# Patient Record
Sex: FEMALE | Race: WHITE | NOT HISPANIC OR LATINO
[De-identification: names, ages, dates, MRNs, and addresses within clinical notes are randomized per-mention and may not be internally consistent; named-entity substitution may affect disease eponyms.]

---

## 2017-07-15 ENCOUNTER — HEALTH MAINTENANCE LETTER (OUTPATIENT)
Age: 52
End: 2017-07-15

## 2018-10-31 ENCOUNTER — COMMUNICATION - HEALTHEAST (OUTPATIENT)
Dept: TELEHEALTH | Facility: CLINIC | Age: 53
End: 2018-10-31

## 2018-10-31 ENCOUNTER — OFFICE VISIT - HEALTHEAST (OUTPATIENT)
Dept: FAMILY MEDICINE | Facility: CLINIC | Age: 53
End: 2018-10-31

## 2018-10-31 ENCOUNTER — COMMUNICATION - HEALTHEAST (OUTPATIENT)
Dept: INTERNAL MEDICINE | Facility: CLINIC | Age: 53
End: 2018-10-31

## 2018-10-31 DIAGNOSIS — M54.16 CHRONIC RADICULAR PAIN OF LOWER BACK: ICD-10-CM

## 2018-10-31 DIAGNOSIS — G89.29 CHRONIC NECK PAIN: ICD-10-CM

## 2018-10-31 DIAGNOSIS — Z12.11 SCREEN FOR COLON CANCER: ICD-10-CM

## 2018-10-31 DIAGNOSIS — M54.2 CHRONIC NECK PAIN: ICD-10-CM

## 2018-10-31 DIAGNOSIS — Z12.31 VISIT FOR SCREENING MAMMOGRAM: ICD-10-CM

## 2018-10-31 DIAGNOSIS — F41.1 GAD (GENERALIZED ANXIETY DISORDER): ICD-10-CM

## 2018-10-31 DIAGNOSIS — G89.29 CHRONIC RADICULAR PAIN OF LOWER BACK: ICD-10-CM

## 2018-10-31 DIAGNOSIS — F33.9 MAJOR DEPRESSION, RECURRENT (H): ICD-10-CM

## 2018-11-01 ENCOUNTER — COMMUNICATION - HEALTHEAST (OUTPATIENT)
Dept: FAMILY MEDICINE | Facility: CLINIC | Age: 53
End: 2018-11-01

## 2018-11-15 ENCOUNTER — OFFICE VISIT - HEALTHEAST (OUTPATIENT)
Dept: FAMILY MEDICINE | Facility: CLINIC | Age: 53
End: 2018-11-15

## 2018-11-15 DIAGNOSIS — M54.2 CHRONIC NECK PAIN: ICD-10-CM

## 2018-11-15 DIAGNOSIS — G89.29 CHRONIC RADICULAR PAIN OF LOWER BACK: ICD-10-CM

## 2018-11-15 DIAGNOSIS — M54.16 CHRONIC RADICULAR PAIN OF LOWER BACK: ICD-10-CM

## 2018-11-15 DIAGNOSIS — F41.1 GAD (GENERALIZED ANXIETY DISORDER): ICD-10-CM

## 2018-11-15 DIAGNOSIS — G89.29 CHRONIC NECK PAIN: ICD-10-CM

## 2018-11-15 DIAGNOSIS — F33.2 SEVERE EPISODE OF RECURRENT MAJOR DEPRESSIVE DISORDER, WITHOUT PSYCHOTIC FEATURES (H): ICD-10-CM

## 2018-11-15 DIAGNOSIS — G44.229 CHRONIC TENSION-TYPE HEADACHE, NOT INTRACTABLE: ICD-10-CM

## 2018-11-20 ENCOUNTER — COMMUNICATION - HEALTHEAST (OUTPATIENT)
Dept: FAMILY MEDICINE | Facility: CLINIC | Age: 53
End: 2018-11-20

## 2018-11-28 ENCOUNTER — COMMUNICATION - HEALTHEAST (OUTPATIENT)
Dept: INTERNAL MEDICINE | Facility: CLINIC | Age: 53
End: 2018-11-28

## 2018-12-12 ENCOUNTER — COMMUNICATION - HEALTHEAST (OUTPATIENT)
Dept: FAMILY MEDICINE | Facility: CLINIC | Age: 53
End: 2018-12-12

## 2018-12-14 ENCOUNTER — HOSPITAL ENCOUNTER (OUTPATIENT)
Dept: PHYSICAL MEDICINE AND REHAB | Facility: CLINIC | Age: 53
Discharge: HOME | End: 2018-12-14
Attending: FAMILY MEDICINE

## 2018-12-14 DIAGNOSIS — M48.061 SPINAL STENOSIS OF LUMBAR REGION WITHOUT NEUROGENIC CLAUDICATION: ICD-10-CM

## 2018-12-14 DIAGNOSIS — M48.02 CERVICAL STENOSIS OF SPINE: ICD-10-CM

## 2018-12-14 DIAGNOSIS — Z98.1 HISTORY OF LUMBAR FUSION: ICD-10-CM

## 2018-12-14 DIAGNOSIS — M53.3 SACROILIAC JOINT PAIN: ICD-10-CM

## 2018-12-17 ENCOUNTER — AMBULATORY - HEALTHEAST (OUTPATIENT)
Dept: NEUROSURGERY | Facility: CLINIC | Age: 53
End: 2018-12-17

## 2018-12-17 DIAGNOSIS — M54.2 CERVICALGIA: ICD-10-CM

## 2018-12-31 ENCOUNTER — OFFICE VISIT - HEALTHEAST (OUTPATIENT)
Dept: FAMILY MEDICINE | Facility: CLINIC | Age: 53
End: 2018-12-31

## 2018-12-31 DIAGNOSIS — F33.2 SEVERE EPISODE OF RECURRENT MAJOR DEPRESSIVE DISORDER, WITHOUT PSYCHOTIC FEATURES (H): ICD-10-CM

## 2018-12-31 DIAGNOSIS — F41.1 GAD (GENERALIZED ANXIETY DISORDER): ICD-10-CM

## 2018-12-31 DIAGNOSIS — R06.02 SOB (SHORTNESS OF BREATH): ICD-10-CM

## 2018-12-31 DIAGNOSIS — F17.210 CIGARETTE NICOTINE DEPENDENCE WITHOUT COMPLICATION: ICD-10-CM

## 2018-12-31 DIAGNOSIS — M54.2 CHRONIC NECK PAIN: ICD-10-CM

## 2018-12-31 DIAGNOSIS — M54.16 CHRONIC RADICULAR PAIN OF LOWER BACK: ICD-10-CM

## 2018-12-31 DIAGNOSIS — G89.29 CHRONIC NECK PAIN: ICD-10-CM

## 2018-12-31 DIAGNOSIS — G89.29 CHRONIC RADICULAR PAIN OF LOWER BACK: ICD-10-CM

## 2018-12-31 DIAGNOSIS — G47.00 INSOMNIA, UNSPECIFIED TYPE: ICD-10-CM

## 2018-12-31 ASSESSMENT — MIFFLIN-ST. JEOR: SCORE: 1398.74

## 2019-01-02 ENCOUNTER — AMBULATORY - HEALTHEAST (OUTPATIENT)
Dept: PULMONOLOGY | Facility: OTHER | Age: 54
End: 2019-01-02

## 2019-01-02 DIAGNOSIS — R06.02 SOB (SHORTNESS OF BREATH): ICD-10-CM

## 2019-01-03 ENCOUNTER — COMMUNICATION - HEALTHEAST (OUTPATIENT)
Dept: FAMILY MEDICINE | Facility: CLINIC | Age: 54
End: 2019-01-03

## 2019-01-03 DIAGNOSIS — G89.29 CHRONIC BILATERAL LOW BACK PAIN WITH BILATERAL SCIATICA: ICD-10-CM

## 2019-01-03 DIAGNOSIS — M54.16 CHRONIC RADICULAR PAIN OF LOWER BACK: ICD-10-CM

## 2019-01-03 DIAGNOSIS — M54.41 CHRONIC BILATERAL LOW BACK PAIN WITH BILATERAL SCIATICA: ICD-10-CM

## 2019-01-03 DIAGNOSIS — G89.29 CHRONIC RADICULAR PAIN OF LOWER BACK: ICD-10-CM

## 2019-01-03 DIAGNOSIS — M54.42 CHRONIC BILATERAL LOW BACK PAIN WITH BILATERAL SCIATICA: ICD-10-CM

## 2019-01-03 DIAGNOSIS — F32.2 SEVERE MAJOR DEPRESSION WITHOUT PSYCHOTIC FEATURES (H): ICD-10-CM

## 2019-01-04 ENCOUNTER — HOME CARE/HOSPICE - HEALTHEAST (OUTPATIENT)
Dept: HOME HEALTH SERVICES | Facility: HOME HEALTH | Age: 54
End: 2019-01-04

## 2019-01-07 ENCOUNTER — COMMUNICATION - HEALTHEAST (OUTPATIENT)
Dept: PULMONOLOGY | Facility: OTHER | Age: 54
End: 2019-01-07

## 2019-01-08 ENCOUNTER — COMMUNICATION - HEALTHEAST (OUTPATIENT)
Dept: HOME HEALTH SERVICES | Facility: HOME HEALTH | Age: 54
End: 2019-01-08

## 2019-01-08 ENCOUNTER — HOME CARE/HOSPICE - HEALTHEAST (OUTPATIENT)
Dept: HOME HEALTH SERVICES | Facility: HOME HEALTH | Age: 54
End: 2019-01-08

## 2019-01-08 ENCOUNTER — COMMUNICATION - HEALTHEAST (OUTPATIENT)
Dept: FAMILY MEDICINE | Facility: CLINIC | Age: 54
End: 2019-01-08

## 2019-01-09 ENCOUNTER — HOME CARE/HOSPICE - HEALTHEAST (OUTPATIENT)
Dept: HOME HEALTH SERVICES | Facility: HOME HEALTH | Age: 54
End: 2019-01-09

## 2019-01-09 ENCOUNTER — COMMUNICATION - HEALTHEAST (OUTPATIENT)
Dept: SCHEDULING | Facility: CLINIC | Age: 54
End: 2019-01-09

## 2019-01-09 DIAGNOSIS — G89.29 CHRONIC NECK PAIN: ICD-10-CM

## 2019-01-09 DIAGNOSIS — G89.29 CHRONIC RADICULAR PAIN OF LOWER BACK: ICD-10-CM

## 2019-01-09 DIAGNOSIS — M54.16 CHRONIC RADICULAR PAIN OF LOWER BACK: ICD-10-CM

## 2019-01-09 DIAGNOSIS — M54.2 CHRONIC NECK PAIN: ICD-10-CM

## 2019-01-18 ENCOUNTER — RECORDS - HEALTHEAST (OUTPATIENT)
Dept: ADMINISTRATIVE | Facility: OTHER | Age: 54
End: 2019-01-18

## 2019-01-29 ENCOUNTER — OFFICE VISIT - HEALTHEAST (OUTPATIENT)
Dept: NEUROSURGERY | Facility: CLINIC | Age: 54
End: 2019-01-29

## 2019-01-29 ENCOUNTER — HOSPITAL ENCOUNTER (OUTPATIENT)
Dept: RADIOLOGY | Facility: HOSPITAL | Age: 54
Discharge: HOME | End: 2019-01-29
Attending: NEUROLOGICAL SURGERY

## 2019-01-29 DIAGNOSIS — M40.12 OTHER SECONDARY KYPHOSIS, CERVICAL REGION: ICD-10-CM

## 2019-01-29 DIAGNOSIS — M47.816 SPONDYLOSIS OF LUMBAR REGION WITHOUT MYELOPATHY OR RADICULOPATHY: ICD-10-CM

## 2019-01-29 DIAGNOSIS — M47.22 OSTEOARTHRITIS OF SPINE WITH RADICULOPATHY, CERVICAL REGION: ICD-10-CM

## 2019-01-29 DIAGNOSIS — M54.2 CERVICALGIA: ICD-10-CM

## 2019-01-29 ASSESSMENT — MIFFLIN-ST. JEOR: SCORE: 1396.01

## 2019-01-31 ENCOUNTER — OFFICE VISIT - HEALTHEAST (OUTPATIENT)
Dept: FAMILY MEDICINE | Facility: CLINIC | Age: 54
End: 2019-01-31

## 2019-01-31 DIAGNOSIS — G47.00 INSOMNIA, UNSPECIFIED TYPE: ICD-10-CM

## 2019-01-31 DIAGNOSIS — F33.2 SEVERE EPISODE OF RECURRENT MAJOR DEPRESSIVE DISORDER, WITHOUT PSYCHOTIC FEATURES (H): ICD-10-CM

## 2019-01-31 DIAGNOSIS — F41.1 GAD (GENERALIZED ANXIETY DISORDER): ICD-10-CM

## 2019-01-31 DIAGNOSIS — G89.29 CHRONIC NECK PAIN: ICD-10-CM

## 2019-01-31 DIAGNOSIS — G89.29 CHRONIC RADICULAR PAIN OF LOWER BACK: ICD-10-CM

## 2019-01-31 DIAGNOSIS — M54.16 CHRONIC RADICULAR PAIN OF LOWER BACK: ICD-10-CM

## 2019-01-31 DIAGNOSIS — M54.2 CHRONIC NECK PAIN: ICD-10-CM

## 2019-01-31 DIAGNOSIS — F17.210 CIGARETTE NICOTINE DEPENDENCE WITHOUT COMPLICATION: ICD-10-CM

## 2019-01-31 LAB
AMPHETAMINES UR QL SCN: ABNORMAL
BARBITURATES UR QL: ABNORMAL
BENZODIAZ UR QL: ABNORMAL
CANNABINOIDS UR QL SCN: ABNORMAL
COCAINE UR QL: ABNORMAL
CREAT UR-MCNC: 67 MG/DL
METHADONE UR QL SCN: ABNORMAL
OPIATES UR QL SCN: ABNORMAL
OXYCODONE UR QL: ABNORMAL
PCP UR QL SCN: ABNORMAL

## 2019-01-31 ASSESSMENT — MIFFLIN-ST. JEOR: SCORE: 1396.01

## 2019-02-11 ENCOUNTER — RECORDS - HEALTHEAST (OUTPATIENT)
Dept: ADMINISTRATIVE | Facility: OTHER | Age: 54
End: 2019-02-11

## 2019-03-01 ENCOUNTER — COMMUNICATION - HEALTHEAST (OUTPATIENT)
Dept: FAMILY MEDICINE | Facility: CLINIC | Age: 54
End: 2019-03-01

## 2019-03-04 ENCOUNTER — COMMUNICATION - HEALTHEAST (OUTPATIENT)
Dept: FAMILY MEDICINE | Facility: CLINIC | Age: 54
End: 2019-03-04

## 2019-03-04 DIAGNOSIS — F41.1 GAD (GENERALIZED ANXIETY DISORDER): ICD-10-CM

## 2019-03-05 ENCOUNTER — COMMUNICATION - HEALTHEAST (OUTPATIENT)
Dept: FAMILY MEDICINE | Facility: CLINIC | Age: 54
End: 2019-03-05

## 2019-03-14 ENCOUNTER — OFFICE VISIT - HEALTHEAST (OUTPATIENT)
Dept: FAMILY MEDICINE | Facility: CLINIC | Age: 54
End: 2019-03-14

## 2019-03-14 DIAGNOSIS — M54.16 CHRONIC RADICULAR PAIN OF LOWER BACK: ICD-10-CM

## 2019-03-14 DIAGNOSIS — F33.2 SEVERE EPISODE OF RECURRENT MAJOR DEPRESSIVE DISORDER, WITHOUT PSYCHOTIC FEATURES (H): ICD-10-CM

## 2019-03-14 DIAGNOSIS — M54.2 CHRONIC NECK PAIN: ICD-10-CM

## 2019-03-14 DIAGNOSIS — G89.29 CHRONIC RADICULAR PAIN OF LOWER BACK: ICD-10-CM

## 2019-03-14 DIAGNOSIS — F41.1 GAD (GENERALIZED ANXIETY DISORDER): ICD-10-CM

## 2019-03-14 DIAGNOSIS — F17.210 CIGARETTE NICOTINE DEPENDENCE WITHOUT COMPLICATION: ICD-10-CM

## 2019-03-14 DIAGNOSIS — G89.29 CHRONIC NECK PAIN: ICD-10-CM

## 2019-04-02 ENCOUNTER — COMMUNICATION - HEALTHEAST (OUTPATIENT)
Dept: FAMILY MEDICINE | Facility: CLINIC | Age: 54
End: 2019-04-02

## 2019-04-02 DIAGNOSIS — G89.29 CHRONIC RADICULAR PAIN OF LOWER BACK: ICD-10-CM

## 2019-04-02 DIAGNOSIS — F41.1 GAD (GENERALIZED ANXIETY DISORDER): ICD-10-CM

## 2019-04-02 DIAGNOSIS — M54.16 CHRONIC RADICULAR PAIN OF LOWER BACK: ICD-10-CM

## 2019-04-04 ENCOUNTER — HOSPITAL ENCOUNTER (OUTPATIENT)
Dept: RADIOLOGY | Facility: HOSPITAL | Age: 54
Discharge: HOME | End: 2019-04-04
Attending: NEUROLOGICAL SURGERY

## 2019-04-04 DIAGNOSIS — M47.816 SPONDYLOSIS OF LUMBAR REGION WITHOUT MYELOPATHY OR RADICULOPATHY: ICD-10-CM

## 2019-04-09 ENCOUNTER — OFFICE VISIT - HEALTHEAST (OUTPATIENT)
Dept: FAMILY MEDICINE | Facility: CLINIC | Age: 54
End: 2019-04-09

## 2019-04-09 DIAGNOSIS — F33.2 SEVERE EPISODE OF RECURRENT MAJOR DEPRESSIVE DISORDER, WITHOUT PSYCHOTIC FEATURES (H): ICD-10-CM

## 2019-04-09 DIAGNOSIS — H91.8X1 OTHER SPECIFIED HEARING LOSS OF RIGHT EAR, UNSPECIFIED HEARING STATUS ON CONTRALATERAL SIDE: ICD-10-CM

## 2019-04-09 DIAGNOSIS — G47.00 INSOMNIA, UNSPECIFIED TYPE: ICD-10-CM

## 2019-04-09 DIAGNOSIS — Z12.31 VISIT FOR SCREENING MAMMOGRAM: ICD-10-CM

## 2019-04-09 DIAGNOSIS — F41.1 GAD (GENERALIZED ANXIETY DISORDER): ICD-10-CM

## 2019-04-09 DIAGNOSIS — F17.210 CIGARETTE NICOTINE DEPENDENCE WITHOUT COMPLICATION: ICD-10-CM

## 2019-04-09 DIAGNOSIS — Z12.11 SCREEN FOR COLON CANCER: ICD-10-CM

## 2019-04-12 ENCOUNTER — COMMUNICATION - HEALTHEAST (OUTPATIENT)
Dept: FAMILY MEDICINE | Facility: CLINIC | Age: 54
End: 2019-04-12

## 2019-04-12 DIAGNOSIS — G47.00 INSOMNIA, UNSPECIFIED TYPE: ICD-10-CM

## 2019-04-19 ENCOUNTER — AMBULATORY - HEALTHEAST (OUTPATIENT)
Dept: NEUROSURGERY | Facility: CLINIC | Age: 54
End: 2019-04-19

## 2019-04-24 ENCOUNTER — OFFICE VISIT - HEALTHEAST (OUTPATIENT)
Dept: NEUROSURGERY | Facility: CLINIC | Age: 54
End: 2019-04-24

## 2019-04-24 DIAGNOSIS — M47.22 CERVICAL SPONDYLOSIS WITH MYELOPATHY AND RADICULOPATHY: ICD-10-CM

## 2019-04-24 DIAGNOSIS — M47.12 CERVICAL SPONDYLOSIS WITH MYELOPATHY AND RADICULOPATHY: ICD-10-CM

## 2019-04-24 DIAGNOSIS — R09.A2 GLOBUS SENSATION: ICD-10-CM

## 2019-04-24 DIAGNOSIS — M40.12 OTHER SECONDARY KYPHOSIS, CERVICAL REGION: ICD-10-CM

## 2019-04-24 DIAGNOSIS — Z72.0 TOBACCO ABUSE: ICD-10-CM

## 2019-04-29 ENCOUNTER — COMMUNICATION - HEALTHEAST (OUTPATIENT)
Dept: FAMILY MEDICINE | Facility: CLINIC | Age: 54
End: 2019-04-29

## 2019-04-29 DIAGNOSIS — M54.16 CHRONIC RADICULAR PAIN OF LOWER BACK: ICD-10-CM

## 2019-04-29 DIAGNOSIS — G89.29 CHRONIC RADICULAR PAIN OF LOWER BACK: ICD-10-CM

## 2019-05-08 ENCOUNTER — TRANSFERRED RECORDS (OUTPATIENT)
Dept: MULTI SPECIALTY CLINIC | Facility: CLINIC | Age: 54
End: 2019-05-08

## 2019-05-08 ENCOUNTER — RECORDS - HEALTHEAST (OUTPATIENT)
Dept: ADMINISTRATIVE | Facility: OTHER | Age: 54
End: 2019-05-08

## 2019-05-08 LAB — COLOGUARD INSUFFICIENT SPECIMEN: NORMAL

## 2019-05-09 ENCOUNTER — COMMUNICATION - HEALTHEAST (OUTPATIENT)
Dept: FAMILY MEDICINE | Facility: CLINIC | Age: 54
End: 2019-05-09

## 2019-05-14 ENCOUNTER — COMMUNICATION - HEALTHEAST (OUTPATIENT)
Dept: NEUROSURGERY | Facility: CLINIC | Age: 54
End: 2019-05-14

## 2019-05-14 ENCOUNTER — COMMUNICATION - HEALTHEAST (OUTPATIENT)
Dept: FAMILY MEDICINE | Facility: CLINIC | Age: 54
End: 2019-05-14

## 2019-05-15 ENCOUNTER — HOSPITAL ENCOUNTER (OUTPATIENT)
Dept: RADIOLOGY | Facility: HOSPITAL | Age: 54
Discharge: HOME | End: 2019-05-15
Attending: NEUROLOGICAL SURGERY

## 2019-05-15 ENCOUNTER — COMMUNICATION - HEALTHEAST (OUTPATIENT)
Dept: FAMILY MEDICINE | Facility: CLINIC | Age: 54
End: 2019-05-15

## 2019-05-15 DIAGNOSIS — F45.8 OTHER SOMATOFORM DISORDERS: ICD-10-CM

## 2019-05-15 DIAGNOSIS — R09.A2 GLOBUS SENSATION: ICD-10-CM

## 2019-05-16 ENCOUNTER — HOSPITAL ENCOUNTER (OUTPATIENT)
Dept: MAMMOGRAPHY | Facility: CLINIC | Age: 54
Discharge: HOME | End: 2019-05-16
Attending: FAMILY MEDICINE

## 2019-05-16 DIAGNOSIS — Z12.31 VISIT FOR SCREENING MAMMOGRAM: ICD-10-CM

## 2019-05-21 ENCOUNTER — COMMUNICATION - HEALTHEAST (OUTPATIENT)
Dept: FAMILY MEDICINE | Facility: CLINIC | Age: 54
End: 2019-05-21

## 2019-05-21 DIAGNOSIS — F17.210 CIGARETTE NICOTINE DEPENDENCE WITHOUT COMPLICATION: ICD-10-CM

## 2019-05-28 ENCOUNTER — COMMUNICATION - HEALTHEAST (OUTPATIENT)
Dept: FAMILY MEDICINE | Facility: CLINIC | Age: 54
End: 2019-05-28

## 2019-05-28 DIAGNOSIS — M54.16 CHRONIC RADICULAR PAIN OF LOWER BACK: ICD-10-CM

## 2019-05-28 DIAGNOSIS — G89.29 CHRONIC RADICULAR PAIN OF LOWER BACK: ICD-10-CM

## 2019-05-31 ENCOUNTER — OFFICE VISIT - HEALTHEAST (OUTPATIENT)
Dept: FAMILY MEDICINE | Facility: CLINIC | Age: 54
End: 2019-05-31

## 2019-05-31 DIAGNOSIS — Z71.6 ENCOUNTER FOR TOBACCO USE CESSATION COUNSELING: ICD-10-CM

## 2019-05-31 DIAGNOSIS — F33.2 SEVERE EPISODE OF RECURRENT MAJOR DEPRESSIVE DISORDER, WITHOUT PSYCHOTIC FEATURES (H): ICD-10-CM

## 2019-05-31 DIAGNOSIS — G89.29 CHRONIC RADICULAR PAIN OF LOWER BACK: ICD-10-CM

## 2019-05-31 DIAGNOSIS — M54.16 CHRONIC RADICULAR PAIN OF LOWER BACK: ICD-10-CM

## 2019-05-31 DIAGNOSIS — Z63.9 DIFFICULTY WITH FAMILY: ICD-10-CM

## 2019-05-31 DIAGNOSIS — Z12.11 SCREEN FOR COLON CANCER: ICD-10-CM

## 2019-05-31 LAB
AMPHETAMINES UR QL SCN: ABNORMAL
BARBITURATES UR QL: ABNORMAL
BENZODIAZ UR QL: ABNORMAL
CANNABINOIDS UR QL SCN: ABNORMAL
COCAINE UR QL: ABNORMAL
CREAT UR-MCNC: 134.7 MG/DL
METHADONE UR QL SCN: ABNORMAL
OPIATES UR QL SCN: ABNORMAL
OXYCODONE UR QL: ABNORMAL
PCP UR QL SCN: ABNORMAL

## 2019-05-31 SDOH — SOCIAL STABILITY - SOCIAL INSECURITY: PROBLEM RELATED TO PRIMARY SUPPORT GROUP, UNSPECIFIED: Z63.9

## 2019-05-31 ASSESSMENT — MIFFLIN-ST. JEOR: SCORE: 1438.65

## 2019-06-03 ENCOUNTER — COMMUNICATION - HEALTHEAST (OUTPATIENT)
Dept: NURSING | Facility: CLINIC | Age: 54
End: 2019-06-03

## 2019-06-03 ENCOUNTER — AMBULATORY - HEALTHEAST (OUTPATIENT)
Dept: LAB | Facility: HOSPITAL | Age: 54
End: 2019-06-03

## 2019-06-03 ENCOUNTER — COMMUNICATION - HEALTHEAST (OUTPATIENT)
Dept: FAMILY MEDICINE | Facility: CLINIC | Age: 54
End: 2019-06-03

## 2019-06-03 DIAGNOSIS — Z72.0 TOBACCO ABUSE: ICD-10-CM

## 2019-06-06 LAB
ANABASINE: <2 NG/ML
COTININE: 22 NG/ML
NICOTINE: 80 NG/ML
NORNICOTINE: 2.7 NG/ML

## 2019-06-07 ENCOUNTER — AMBULATORY - HEALTHEAST (OUTPATIENT)
Dept: NEUROSURGERY | Facility: CLINIC | Age: 54
End: 2019-06-07

## 2019-06-07 DIAGNOSIS — Z01.818 PRE-OP EXAM: ICD-10-CM

## 2019-06-14 ENCOUNTER — COMMUNICATION - HEALTHEAST (OUTPATIENT)
Dept: FAMILY MEDICINE | Facility: CLINIC | Age: 54
End: 2019-06-14

## 2019-06-14 ENCOUNTER — COMMUNICATION - HEALTHEAST (OUTPATIENT)
Dept: NURSING | Facility: CLINIC | Age: 54
End: 2019-06-14

## 2019-06-14 DIAGNOSIS — G47.00 INSOMNIA, UNSPECIFIED TYPE: ICD-10-CM

## 2019-06-21 ENCOUNTER — COMMUNICATION - HEALTHEAST (OUTPATIENT)
Dept: FAMILY MEDICINE | Facility: CLINIC | Age: 54
End: 2019-06-21

## 2019-06-21 DIAGNOSIS — G89.29 CHRONIC RADICULAR PAIN OF LOWER BACK: ICD-10-CM

## 2019-06-21 DIAGNOSIS — M54.16 CHRONIC RADICULAR PAIN OF LOWER BACK: ICD-10-CM

## 2019-06-25 ENCOUNTER — COMMUNICATION - HEALTHEAST (OUTPATIENT)
Dept: FAMILY MEDICINE | Facility: CLINIC | Age: 54
End: 2019-06-25

## 2019-06-25 ENCOUNTER — COMMUNICATION - HEALTHEAST (OUTPATIENT)
Dept: NURSING | Facility: CLINIC | Age: 54
End: 2019-06-25

## 2019-06-25 DIAGNOSIS — G89.29 CHRONIC RADICULAR PAIN OF LOWER BACK: ICD-10-CM

## 2019-06-25 DIAGNOSIS — M54.16 CHRONIC RADICULAR PAIN OF LOWER BACK: ICD-10-CM

## 2019-06-27 ENCOUNTER — COMMUNICATION - HEALTHEAST (OUTPATIENT)
Dept: NURSING | Facility: CLINIC | Age: 54
End: 2019-06-27

## 2019-06-28 ENCOUNTER — AMBULATORY - HEALTHEAST (OUTPATIENT)
Dept: LAB | Facility: HOSPITAL | Age: 54
End: 2019-06-28

## 2019-06-28 DIAGNOSIS — Z01.818 PRE-OP EXAM: ICD-10-CM

## 2019-07-01 LAB
ANABASINE: <2 NG/ML
COTININE: 16 NG/ML
NICOTINE: 7.6 NG/ML
NORNICOTINE: <2 NG/ML

## 2019-07-03 ENCOUNTER — AMBULATORY - HEALTHEAST (OUTPATIENT)
Dept: NEUROSURGERY | Facility: CLINIC | Age: 54
End: 2019-07-03

## 2019-07-03 DIAGNOSIS — Z01.818 PRE-OP EXAM: ICD-10-CM

## 2019-07-10 ENCOUNTER — RECORDS - HEALTHEAST (OUTPATIENT)
Dept: ADMINISTRATIVE | Facility: OTHER | Age: 54
End: 2019-07-10

## 2019-07-10 ENCOUNTER — AMBULATORY - HEALTHEAST (OUTPATIENT)
Dept: FAMILY MEDICINE | Facility: CLINIC | Age: 54
End: 2019-07-10

## 2019-07-10 DIAGNOSIS — F41.1 GAD (GENERALIZED ANXIETY DISORDER): ICD-10-CM

## 2019-07-10 DIAGNOSIS — F33.1 MODERATE EPISODE OF RECURRENT MAJOR DEPRESSIVE DISORDER (H): ICD-10-CM

## 2019-07-16 ENCOUNTER — COMMUNICATION - HEALTHEAST (OUTPATIENT)
Dept: FAMILY MEDICINE | Facility: CLINIC | Age: 54
End: 2019-07-16

## 2019-07-17 ENCOUNTER — AMBULATORY - HEALTHEAST (OUTPATIENT)
Dept: LAB | Facility: HOSPITAL | Age: 54
End: 2019-07-17

## 2019-07-17 DIAGNOSIS — Z01.818 PRE-OP EXAM: ICD-10-CM

## 2019-07-19 LAB
ANABASINE: <2 NG/ML
COTININE: 13 NG/ML
NICOTINE: 27 NG/ML
NORNICOTINE: <2 NG/ML

## 2019-07-29 ENCOUNTER — OFFICE VISIT - HEALTHEAST (OUTPATIENT)
Dept: FAMILY MEDICINE | Facility: CLINIC | Age: 54
End: 2019-07-29

## 2019-07-29 ENCOUNTER — COMMUNICATION - HEALTHEAST (OUTPATIENT)
Dept: FAMILY MEDICINE | Facility: CLINIC | Age: 54
End: 2019-07-29

## 2019-07-29 ENCOUNTER — RECORDS - HEALTHEAST (OUTPATIENT)
Dept: FAMILY MEDICINE | Facility: CLINIC | Age: 54
End: 2019-07-29

## 2019-07-29 DIAGNOSIS — G89.29 CHRONIC RADICULAR PAIN OF LOWER BACK: ICD-10-CM

## 2019-07-29 DIAGNOSIS — F41.1 GAD (GENERALIZED ANXIETY DISORDER): ICD-10-CM

## 2019-07-29 DIAGNOSIS — M54.16 CHRONIC RADICULAR PAIN OF LOWER BACK: ICD-10-CM

## 2019-07-29 DIAGNOSIS — G89.29 OTHER CHRONIC PAIN: ICD-10-CM

## 2019-07-29 DIAGNOSIS — F17.210 CIGARETTE NICOTINE DEPENDENCE WITHOUT COMPLICATION: ICD-10-CM

## 2019-07-29 DIAGNOSIS — J06.9 VIRAL UPPER RESPIRATORY TRACT INFECTION: ICD-10-CM

## 2019-07-29 ASSESSMENT — MIFFLIN-ST. JEOR: SCORE: 1427.31

## 2019-08-02 ENCOUNTER — AMBULATORY - HEALTHEAST (OUTPATIENT)
Dept: LAB | Facility: HOSPITAL | Age: 54
End: 2019-08-02

## 2019-08-02 ENCOUNTER — AMBULATORY - HEALTHEAST (OUTPATIENT)
Dept: NEUROSURGERY | Facility: CLINIC | Age: 54
End: 2019-08-02

## 2019-08-02 DIAGNOSIS — Z01.818 PRE-OP EXAM: ICD-10-CM

## 2019-08-03 LAB
ANABASINE: <2 NG/ML
COTININE: 32 NG/ML
NICOTINE: 110 NG/ML
NORNICOTINE: 4.3 NG/ML

## 2019-08-07 ENCOUNTER — AMBULATORY - HEALTHEAST (OUTPATIENT)
Dept: NEUROSURGERY | Facility: CLINIC | Age: 54
End: 2019-08-07

## 2019-08-07 DIAGNOSIS — Z01.818 PRE-OP EXAM: ICD-10-CM

## 2019-08-24 ENCOUNTER — COMMUNICATION - HEALTHEAST (OUTPATIENT)
Dept: FAMILY MEDICINE | Facility: CLINIC | Age: 54
End: 2019-08-24

## 2019-08-24 DIAGNOSIS — G89.29 CHRONIC RADICULAR PAIN OF LOWER BACK: ICD-10-CM

## 2019-08-24 DIAGNOSIS — M54.16 CHRONIC RADICULAR PAIN OF LOWER BACK: ICD-10-CM

## 2019-08-29 ENCOUNTER — AMBULATORY - HEALTHEAST (OUTPATIENT)
Dept: LAB | Facility: HOSPITAL | Age: 54
End: 2019-08-29

## 2019-08-29 ENCOUNTER — OFFICE VISIT - HEALTHEAST (OUTPATIENT)
Dept: FAMILY MEDICINE | Facility: CLINIC | Age: 54
End: 2019-08-29

## 2019-08-29 DIAGNOSIS — M54.16 CHRONIC RADICULAR PAIN OF LOWER BACK: ICD-10-CM

## 2019-08-29 DIAGNOSIS — F41.1 GAD (GENERALIZED ANXIETY DISORDER): ICD-10-CM

## 2019-08-29 DIAGNOSIS — F33.2 SEVERE EPISODE OF RECURRENT MAJOR DEPRESSIVE DISORDER, WITHOUT PSYCHOTIC FEATURES (H): ICD-10-CM

## 2019-08-29 DIAGNOSIS — G89.29 CHRONIC NECK PAIN: ICD-10-CM

## 2019-08-29 DIAGNOSIS — Z01.818 PRE-OP EXAM: ICD-10-CM

## 2019-08-29 DIAGNOSIS — M54.2 CHRONIC NECK PAIN: ICD-10-CM

## 2019-08-29 DIAGNOSIS — Z00.00 ROUTINE GENERAL MEDICAL EXAMINATION AT A HEALTH CARE FACILITY: ICD-10-CM

## 2019-08-29 DIAGNOSIS — Z12.11 SCREEN FOR COLON CANCER: ICD-10-CM

## 2019-08-29 DIAGNOSIS — G89.29 CHRONIC RADICULAR PAIN OF LOWER BACK: ICD-10-CM

## 2019-08-29 LAB
ALBUMIN SERPL-MCNC: 3.8 G/DL (ref 3.5–5)
ALP SERPL-CCNC: 108 U/L (ref 45–120)
ALT SERPL W P-5'-P-CCNC: 28 U/L (ref 0–45)
AMPHETAMINES UR QL SCN: ABNORMAL
ANION GAP SERPL CALCULATED.3IONS-SCNC: 9 MMOL/L (ref 5–18)
AST SERPL W P-5'-P-CCNC: 23 U/L (ref 0–40)
BARBITURATES UR QL: ABNORMAL
BASOPHILS # BLD AUTO: 0.1 THOU/UL (ref 0–0.2)
BASOPHILS NFR BLD AUTO: 1 % (ref 0–2)
BENZODIAZ UR QL: ABNORMAL
BILIRUB SERPL-MCNC: 0.3 MG/DL (ref 0–1)
BUN SERPL-MCNC: 10 MG/DL (ref 8–22)
CALCIUM SERPL-MCNC: 10 MG/DL (ref 8.5–10.5)
CANNABINOIDS UR QL SCN: ABNORMAL
CHLORIDE BLD-SCNC: 106 MMOL/L (ref 98–107)
CHOLEST SERPL-MCNC: 203 MG/DL
CO2 SERPL-SCNC: 24 MMOL/L (ref 22–31)
COCAINE UR QL: ABNORMAL
CREAT SERPL-MCNC: 0.89 MG/DL (ref 0.6–1.1)
CREAT UR-MCNC: 147.9 MG/DL
EOSINOPHIL # BLD AUTO: 0.4 THOU/UL (ref 0–0.4)
EOSINOPHIL NFR BLD AUTO: 4 % (ref 0–6)
ERYTHROCYTE [DISTWIDTH] IN BLOOD BY AUTOMATED COUNT: 14.1 % (ref 11–14.5)
FASTING STATUS PATIENT QL REPORTED: YES
GFR SERPL CREATININE-BSD FRML MDRD: >60 ML/MIN/1.73M2
GLUCOSE BLD-MCNC: 98 MG/DL (ref 70–125)
HCT VFR BLD AUTO: 40.2 % (ref 35–47)
HDLC SERPL-MCNC: 29 MG/DL
HGB BLD-MCNC: 13.1 G/DL (ref 12–16)
LDLC SERPL CALC-MCNC: 133 MG/DL
LYMPHOCYTES # BLD AUTO: 2.1 THOU/UL (ref 0.8–4.4)
LYMPHOCYTES NFR BLD AUTO: 21 % (ref 20–40)
MCH RBC QN AUTO: 27.7 PG (ref 27–34)
MCHC RBC AUTO-ENTMCNC: 32.6 G/DL (ref 32–36)
MCV RBC AUTO: 85 FL (ref 80–100)
METHADONE UR QL SCN: ABNORMAL
MONOCYTES # BLD AUTO: 0.7 THOU/UL (ref 0–0.9)
MONOCYTES NFR BLD AUTO: 7 % (ref 2–10)
NEUTROPHILS # BLD AUTO: 6.7 THOU/UL (ref 2–7.7)
NEUTROPHILS NFR BLD AUTO: 67 % (ref 50–70)
OPIATES UR QL SCN: ABNORMAL
OXYCODONE UR QL: ABNORMAL
PCP UR QL SCN: ABNORMAL
PLATELET # BLD AUTO: 398 THOU/UL (ref 140–440)
PMV BLD AUTO: 10.2 FL (ref 8.5–12.5)
POTASSIUM BLD-SCNC: 4.5 MMOL/L (ref 3.5–5)
PROT SERPL-MCNC: 7.7 G/DL (ref 6–8)
RBC # BLD AUTO: 4.73 MILL/UL (ref 3.8–5.4)
SODIUM SERPL-SCNC: 139 MMOL/L (ref 136–145)
TRIGL SERPL-MCNC: 203 MG/DL
WBC: 10.1 THOU/UL (ref 4–11)

## 2019-08-29 ASSESSMENT — MIFFLIN-ST. JEOR: SCORE: 1445

## 2019-08-30 ENCOUNTER — COMMUNICATION - HEALTHEAST (OUTPATIENT)
Dept: FAMILY MEDICINE | Facility: CLINIC | Age: 54
End: 2019-08-30

## 2019-09-03 LAB
ANABASINE: <2 NG/ML
COTININE: NORMAL NG/ML
NICOTINE: NORMAL NG/ML
NORNICOTINE: <2 NG/ML

## 2019-09-04 ENCOUNTER — AMBULATORY - HEALTHEAST (OUTPATIENT)
Dept: NEUROSURGERY | Facility: CLINIC | Age: 54
End: 2019-09-04

## 2019-09-04 DIAGNOSIS — Z01.818 PRE-OP EXAM: ICD-10-CM

## 2019-09-09 ENCOUNTER — AMBULATORY - HEALTHEAST (OUTPATIENT)
Dept: LAB | Facility: HOSPITAL | Age: 54
End: 2019-09-09

## 2019-09-09 DIAGNOSIS — Z01.818 PRE-OP EXAM: ICD-10-CM

## 2019-09-12 LAB
ANABASINE: <2 NG/ML
COTININE: NORMAL NG/ML
NICOTINE: NORMAL NG/ML
NORNICOTINE: <2 NG/ML

## 2019-09-13 ENCOUNTER — AMBULATORY - HEALTHEAST (OUTPATIENT)
Dept: NEUROSURGERY | Facility: CLINIC | Age: 54
End: 2019-09-13

## 2019-09-13 DIAGNOSIS — Z01.818 PRE-OP EXAM: ICD-10-CM

## 2019-09-19 ENCOUNTER — AMBULATORY - HEALTHEAST (OUTPATIENT)
Dept: LAB | Facility: HOSPITAL | Age: 54
End: 2019-09-19

## 2019-09-19 DIAGNOSIS — Z01.818 PRE-OP EXAM: ICD-10-CM

## 2019-09-23 LAB
ANABASINE: <2 NG/ML
COTININE: 15 NG/ML
NICOTINE: 15 NG/ML
NORNICOTINE: <2 NG/ML

## 2019-09-25 ENCOUNTER — AMBULATORY - HEALTHEAST (OUTPATIENT)
Dept: NEUROSURGERY | Facility: CLINIC | Age: 54
End: 2019-09-25

## 2019-09-25 DIAGNOSIS — Z01.818 PRE-OP EXAM: ICD-10-CM

## 2019-10-01 ENCOUNTER — COMMUNICATION - HEALTHEAST (OUTPATIENT)
Dept: FAMILY MEDICINE | Facility: CLINIC | Age: 54
End: 2019-10-01

## 2019-10-01 DIAGNOSIS — G47.00 INSOMNIA, UNSPECIFIED TYPE: ICD-10-CM

## 2019-10-01 DIAGNOSIS — G89.29 CHRONIC RADICULAR PAIN OF LOWER BACK: ICD-10-CM

## 2019-10-01 DIAGNOSIS — M54.16 CHRONIC RADICULAR PAIN OF LOWER BACK: ICD-10-CM

## 2019-10-03 ENCOUNTER — COMMUNICATION - HEALTHEAST (OUTPATIENT)
Dept: FAMILY MEDICINE | Facility: CLINIC | Age: 54
End: 2019-10-03

## 2019-10-04 ENCOUNTER — AMBULATORY - HEALTHEAST (OUTPATIENT)
Dept: LAB | Facility: HOSPITAL | Age: 54
End: 2019-10-04

## 2019-10-04 DIAGNOSIS — Z01.818 PRE-OP EXAM: ICD-10-CM

## 2019-10-08 LAB
ANABASINE: <2 NG/ML
COTININE: ABNORMAL NG/ML
NICOTINE: 35 NG/ML
NORNICOTINE: <2 NG/ML

## 2019-10-28 ENCOUNTER — COMMUNICATION - HEALTHEAST (OUTPATIENT)
Dept: FAMILY MEDICINE | Facility: CLINIC | Age: 54
End: 2019-10-28

## 2019-10-28 DIAGNOSIS — G89.29 CHRONIC RADICULAR PAIN OF LOWER BACK: ICD-10-CM

## 2019-10-28 DIAGNOSIS — M54.16 CHRONIC RADICULAR PAIN OF LOWER BACK: ICD-10-CM

## 2019-10-29 ENCOUNTER — COMMUNICATION - HEALTHEAST (OUTPATIENT)
Dept: NEUROSURGERY | Facility: CLINIC | Age: 54
End: 2019-10-29

## 2020-01-28 ENCOUNTER — OFFICE VISIT - HEALTHEAST (OUTPATIENT)
Dept: NEUROSURGERY | Facility: CLINIC | Age: 55
End: 2020-01-28

## 2020-01-28 DIAGNOSIS — F17.200 NICOTINE DEPENDENCE WITH CURRENT USE: ICD-10-CM

## 2020-01-28 DIAGNOSIS — M40.12 OTHER SECONDARY KYPHOSIS, CERVICAL REGION: ICD-10-CM

## 2020-01-28 DIAGNOSIS — F41.9 ANXIETY: ICD-10-CM

## 2020-01-28 DIAGNOSIS — R15.9 INCONTINENCE OF FECES, UNSPECIFIED FECAL INCONTINENCE TYPE: ICD-10-CM

## 2020-01-28 DIAGNOSIS — G95.20 CERVICAL CORD COMPRESSION WITH MYELOPATHY (H): ICD-10-CM

## 2020-01-28 DIAGNOSIS — F32.A DEPRESSION, UNSPECIFIED DEPRESSION TYPE: ICD-10-CM

## 2020-01-28 DIAGNOSIS — R32 URINARY INCONTINENCE, UNSPECIFIED TYPE: ICD-10-CM

## 2020-01-28 ASSESSMENT — MIFFLIN-ST. JEOR: SCORE: 1311.19

## 2020-01-29 ENCOUNTER — COMMUNICATION - HEALTHEAST (OUTPATIENT)
Dept: FAMILY MEDICINE | Facility: CLINIC | Age: 55
End: 2020-01-29

## 2020-02-03 ENCOUNTER — COMMUNICATION - HEALTHEAST (OUTPATIENT)
Dept: FAMILY MEDICINE | Facility: CLINIC | Age: 55
End: 2020-02-03

## 2020-02-03 ENCOUNTER — OFFICE VISIT - HEALTHEAST (OUTPATIENT)
Dept: FAMILY MEDICINE | Facility: CLINIC | Age: 55
End: 2020-02-03

## 2020-02-03 ENCOUNTER — COMMUNICATION - HEALTHEAST (OUTPATIENT)
Dept: LAB | Facility: CLINIC | Age: 55
End: 2020-02-03

## 2020-02-03 DIAGNOSIS — G89.29 CHRONIC NECK PAIN: ICD-10-CM

## 2020-02-03 DIAGNOSIS — F41.1 GAD (GENERALIZED ANXIETY DISORDER): ICD-10-CM

## 2020-02-03 DIAGNOSIS — F11.90 CHRONIC, CONTINUOUS USE OF OPIOIDS: ICD-10-CM

## 2020-02-03 DIAGNOSIS — F17.210 CIGARETTE NICOTINE DEPENDENCE WITHOUT COMPLICATION: ICD-10-CM

## 2020-02-03 DIAGNOSIS — M54.2 CHRONIC NECK PAIN: ICD-10-CM

## 2020-02-03 DIAGNOSIS — F33.2 SEVERE EPISODE OF RECURRENT MAJOR DEPRESSIVE DISORDER, WITHOUT PSYCHOTIC FEATURES (H): ICD-10-CM

## 2020-02-03 ASSESSMENT — PATIENT HEALTH QUESTIONNAIRE - PHQ9: SUM OF ALL RESPONSES TO PHQ QUESTIONS 1-9: 21

## 2020-02-03 ASSESSMENT — MIFFLIN-ST. JEOR: SCORE: 1341.58

## 2020-02-04 LAB
AMPHETAMINES UR QL SCN: ABNORMAL
BARBITURATES UR QL: ABNORMAL
BENZODIAZ UR QL: ABNORMAL
CANNABINOIDS UR QL SCN: ABNORMAL
COCAINE UR QL: ABNORMAL
CREAT UR-MCNC: 86.1 MG/DL
METHADONE UR QL SCN: ABNORMAL
OPIATES UR QL SCN: ABNORMAL
OXYCODONE UR QL: ABNORMAL
PCP UR QL SCN: ABNORMAL

## 2020-02-05 ENCOUNTER — AMBULATORY - HEALTHEAST (OUTPATIENT)
Dept: PALLIATIVE MEDICINE | Facility: OTHER | Age: 55
End: 2020-02-05

## 2020-02-06 ENCOUNTER — COMMUNICATION - HEALTHEAST (OUTPATIENT)
Dept: FAMILY MEDICINE | Facility: CLINIC | Age: 55
End: 2020-02-06

## 2020-02-06 DIAGNOSIS — M54.2 CHRONIC NECK PAIN: ICD-10-CM

## 2020-02-06 DIAGNOSIS — G89.29 CHRONIC NECK PAIN: ICD-10-CM

## 2020-02-06 DIAGNOSIS — G47.00 INSOMNIA, UNSPECIFIED TYPE: ICD-10-CM

## 2020-02-21 ENCOUNTER — HOSPITAL ENCOUNTER (OUTPATIENT)
Dept: MRI IMAGING | Facility: HOSPITAL | Age: 55
Discharge: HOME | End: 2020-02-21
Attending: NEUROLOGICAL SURGERY

## 2020-02-21 DIAGNOSIS — M40.12 OTHER SECONDARY KYPHOSIS, CERVICAL REGION: ICD-10-CM

## 2020-02-21 DIAGNOSIS — R15.9 INCONTINENCE OF FECES, UNSPECIFIED FECAL INCONTINENCE TYPE: ICD-10-CM

## 2020-02-21 DIAGNOSIS — R32 URINARY INCONTINENCE, UNSPECIFIED TYPE: ICD-10-CM

## 2020-02-21 DIAGNOSIS — G95.20 CERVICAL CORD COMPRESSION WITH MYELOPATHY (H): ICD-10-CM

## 2020-03-02 ENCOUNTER — OFFICE VISIT - HEALTHEAST (OUTPATIENT)
Dept: FAMILY MEDICINE | Facility: CLINIC | Age: 55
End: 2020-03-02

## 2020-03-02 DIAGNOSIS — M54.2 CHRONIC NECK PAIN: ICD-10-CM

## 2020-03-02 DIAGNOSIS — F17.200 NICOTINE USE DISORDER: ICD-10-CM

## 2020-03-02 DIAGNOSIS — Z71.6 ENCOUNTER FOR TOBACCO USE CESSATION COUNSELING: ICD-10-CM

## 2020-03-02 DIAGNOSIS — F41.1 GAD (GENERALIZED ANXIETY DISORDER): ICD-10-CM

## 2020-03-02 DIAGNOSIS — F33.2 SEVERE EPISODE OF RECURRENT MAJOR DEPRESSIVE DISORDER, WITHOUT PSYCHOTIC FEATURES (H): ICD-10-CM

## 2020-03-02 DIAGNOSIS — F17.210 CIGARETTE NICOTINE DEPENDENCE WITHOUT COMPLICATION: ICD-10-CM

## 2020-03-02 DIAGNOSIS — F11.90 CHRONIC, CONTINUOUS USE OF OPIOIDS: ICD-10-CM

## 2020-03-02 DIAGNOSIS — G89.29 CHRONIC NECK PAIN: ICD-10-CM

## 2020-03-02 ASSESSMENT — MIFFLIN-ST. JEOR: SCORE: 1378.32

## 2020-03-03 ENCOUNTER — COMMUNICATION - HEALTHEAST (OUTPATIENT)
Dept: FAMILY MEDICINE | Facility: CLINIC | Age: 55
End: 2020-03-03

## 2020-03-04 ENCOUNTER — AMBULATORY - HEALTHEAST (OUTPATIENT)
Dept: NEUROSURGERY | Facility: CLINIC | Age: 55
End: 2020-03-04

## 2020-03-04 ENCOUNTER — COMMUNICATION - HEALTHEAST (OUTPATIENT)
Dept: NEUROSURGERY | Facility: CLINIC | Age: 55
End: 2020-03-04

## 2020-03-04 DIAGNOSIS — M54.9 BACK PAIN: ICD-10-CM

## 2020-03-15 ENCOUNTER — HOSPITAL ENCOUNTER (OUTPATIENT)
Dept: MRI IMAGING | Facility: HOSPITAL | Age: 55
Discharge: HOME | End: 2020-03-15
Attending: NEUROLOGICAL SURGERY

## 2020-03-15 DIAGNOSIS — M54.9 BACK PAIN: ICD-10-CM

## 2020-03-15 LAB
CREAT BLD-MCNC: 0.5 MG/DL (ref 0.6–1.1)
GFR SERPL CREATININE-BSD FRML MDRD: >60 ML/MIN/1.73M2

## 2020-03-25 ENCOUNTER — COMMUNICATION - HEALTHEAST (OUTPATIENT)
Dept: FAMILY MEDICINE | Facility: CLINIC | Age: 55
End: 2020-03-25

## 2020-03-30 ENCOUNTER — COMMUNICATION - HEALTHEAST (OUTPATIENT)
Dept: FAMILY MEDICINE | Facility: CLINIC | Age: 55
End: 2020-03-30

## 2020-03-30 ENCOUNTER — OFFICE VISIT - HEALTHEAST (OUTPATIENT)
Dept: FAMILY MEDICINE | Facility: CLINIC | Age: 55
End: 2020-03-30

## 2020-03-30 DIAGNOSIS — F11.90 CHRONIC, CONTINUOUS USE OF OPIOIDS: ICD-10-CM

## 2020-03-30 DIAGNOSIS — G89.29 CHRONIC NECK PAIN: ICD-10-CM

## 2020-03-30 DIAGNOSIS — F17.200 NICOTINE USE DISORDER: ICD-10-CM

## 2020-03-30 DIAGNOSIS — M54.2 CHRONIC NECK PAIN: ICD-10-CM

## 2020-04-01 ENCOUNTER — COMMUNICATION - HEALTHEAST (OUTPATIENT)
Dept: FAMILY MEDICINE | Facility: CLINIC | Age: 55
End: 2020-04-01

## 2020-04-03 ENCOUNTER — OFFICE VISIT - HEALTHEAST (OUTPATIENT)
Dept: BEHAVIORAL HEALTH | Facility: HOSPITAL | Age: 55
End: 2020-04-03

## 2020-04-03 ASSESSMENT — PATIENT HEALTH QUESTIONNAIRE - PHQ9: SUM OF ALL RESPONSES TO PHQ QUESTIONS 1-9: 25

## 2020-04-03 ASSESSMENT — ANXIETY QUESTIONNAIRES
7. FEELING AFRAID AS IF SOMETHING AWFUL MIGHT HAPPEN: NEARLY EVERY DAY
1. FEELING NERVOUS, ANXIOUS, OR ON EDGE: NEARLY EVERY DAY
2. NOT BEING ABLE TO STOP OR CONTROL WORRYING: MORE THAN HALF THE DAYS
3. WORRYING TOO MUCH ABOUT DIFFERENT THINGS: MORE THAN HALF THE DAYS
GAD7 TOTAL SCORE: 18
4. TROUBLE RELAXING: NEARLY EVERY DAY
6. BECOMING EASILY ANNOYED OR IRRITABLE: MORE THAN HALF THE DAYS
5. BEING SO RESTLESS THAT IT IS HARD TO SIT STILL: NEARLY EVERY DAY

## 2020-04-07 ENCOUNTER — COMMUNICATION - HEALTHEAST (OUTPATIENT)
Dept: BEHAVIORAL HEALTH | Facility: CLINIC | Age: 55
End: 2020-04-07

## 2020-04-08 ENCOUNTER — RECORDS - HEALTHEAST (OUTPATIENT)
Dept: SCHEDULING | Facility: CLINIC | Age: 55
End: 2020-04-08

## 2020-04-29 ENCOUNTER — COMMUNICATION - HEALTHEAST (OUTPATIENT)
Dept: BEHAVIORAL HEALTH | Facility: CLINIC | Age: 55
End: 2020-04-29

## 2020-04-29 ENCOUNTER — COMMUNICATION - HEALTHEAST (OUTPATIENT)
Dept: FAMILY MEDICINE | Facility: CLINIC | Age: 55
End: 2020-04-29

## 2020-04-29 DIAGNOSIS — F11.90 CHRONIC, CONTINUOUS USE OF OPIOIDS: ICD-10-CM

## 2020-04-29 DIAGNOSIS — M54.2 CHRONIC NECK PAIN: ICD-10-CM

## 2020-04-29 DIAGNOSIS — G89.29 CHRONIC NECK PAIN: ICD-10-CM

## 2020-05-01 ENCOUNTER — COMMUNICATION - HEALTHEAST (OUTPATIENT)
Dept: FAMILY MEDICINE | Facility: CLINIC | Age: 55
End: 2020-05-01

## 2020-05-01 DIAGNOSIS — F11.90 CHRONIC, CONTINUOUS USE OF OPIOIDS: ICD-10-CM

## 2020-05-01 DIAGNOSIS — M54.2 CHRONIC NECK PAIN: ICD-10-CM

## 2020-05-01 DIAGNOSIS — G89.29 CHRONIC NECK PAIN: ICD-10-CM

## 2020-05-14 ENCOUNTER — OFFICE VISIT - HEALTHEAST (OUTPATIENT)
Dept: FAMILY MEDICINE | Facility: CLINIC | Age: 55
End: 2020-05-14

## 2020-05-14 DIAGNOSIS — F33.2 SEVERE EPISODE OF RECURRENT MAJOR DEPRESSIVE DISORDER, WITHOUT PSYCHOTIC FEATURES (H): ICD-10-CM

## 2020-05-14 DIAGNOSIS — G89.29 CHRONIC NECK PAIN: ICD-10-CM

## 2020-05-14 DIAGNOSIS — M54.2 CHRONIC NECK PAIN: ICD-10-CM

## 2020-05-14 DIAGNOSIS — F17.210 CIGARETTE NICOTINE DEPENDENCE WITHOUT COMPLICATION: ICD-10-CM

## 2020-05-14 DIAGNOSIS — F11.90 CHRONIC, CONTINUOUS USE OF OPIOIDS: ICD-10-CM

## 2020-06-05 ENCOUNTER — OFFICE VISIT - HEALTHEAST (OUTPATIENT)
Dept: BEHAVIORAL HEALTH | Facility: HOSPITAL | Age: 55
End: 2020-06-05

## 2020-06-05 DIAGNOSIS — F33.2 SEVERE EPISODE OF RECURRENT MAJOR DEPRESSIVE DISORDER, WITHOUT PSYCHOTIC FEATURES (H): ICD-10-CM

## 2020-06-05 ASSESSMENT — ANXIETY QUESTIONNAIRES
6. BECOMING EASILY ANNOYED OR IRRITABLE: NEARLY EVERY DAY
3. WORRYING TOO MUCH ABOUT DIFFERENT THINGS: NEARLY EVERY DAY
GAD7 TOTAL SCORE: 21
1. FEELING NERVOUS, ANXIOUS, OR ON EDGE: NEARLY EVERY DAY
2. NOT BEING ABLE TO STOP OR CONTROL WORRYING: NEARLY EVERY DAY
5. BEING SO RESTLESS THAT IT IS HARD TO SIT STILL: NEARLY EVERY DAY
7. FEELING AFRAID AS IF SOMETHING AWFUL MIGHT HAPPEN: NEARLY EVERY DAY
4. TROUBLE RELAXING: NEARLY EVERY DAY

## 2020-06-05 ASSESSMENT — PATIENT HEALTH QUESTIONNAIRE - PHQ9: SUM OF ALL RESPONSES TO PHQ QUESTIONS 1-9: 22

## 2020-06-16 ENCOUNTER — COMMUNICATION - HEALTHEAST (OUTPATIENT)
Dept: FAMILY MEDICINE | Facility: CLINIC | Age: 55
End: 2020-06-16

## 2020-06-16 DIAGNOSIS — G89.29 CHRONIC NECK PAIN: ICD-10-CM

## 2020-06-16 DIAGNOSIS — M54.2 CHRONIC NECK PAIN: ICD-10-CM

## 2020-06-16 DIAGNOSIS — F11.90 CHRONIC, CONTINUOUS USE OF OPIOIDS: ICD-10-CM

## 2020-06-17 ENCOUNTER — OFFICE VISIT - HEALTHEAST (OUTPATIENT)
Dept: BEHAVIORAL HEALTH | Facility: CLINIC | Age: 55
End: 2020-06-17

## 2020-06-17 DIAGNOSIS — F33.2 SEVERE EPISODE OF RECURRENT MAJOR DEPRESSIVE DISORDER, WITHOUT PSYCHOTIC FEATURES (H): ICD-10-CM

## 2020-07-10 ENCOUNTER — COMMUNICATION - HEALTHEAST (OUTPATIENT)
Dept: BEHAVIORAL HEALTH | Facility: CLINIC | Age: 55
End: 2020-07-10

## 2020-07-14 ENCOUNTER — RECORDS - HEALTHEAST (OUTPATIENT)
Dept: ADMINISTRATIVE | Facility: OTHER | Age: 55
End: 2020-07-14

## 2020-07-15 ENCOUNTER — OFFICE VISIT - HEALTHEAST (OUTPATIENT)
Dept: BEHAVIORAL HEALTH | Facility: CLINIC | Age: 55
End: 2020-07-15

## 2020-07-15 DIAGNOSIS — F33.2 SEVERE EPISODE OF RECURRENT MAJOR DEPRESSIVE DISORDER, WITHOUT PSYCHOTIC FEATURES (H): ICD-10-CM

## 2020-07-17 ENCOUNTER — COMMUNICATION - HEALTHEAST (OUTPATIENT)
Dept: FAMILY MEDICINE | Facility: CLINIC | Age: 55
End: 2020-07-17

## 2020-07-17 DIAGNOSIS — G89.29 CHRONIC NECK PAIN: ICD-10-CM

## 2020-07-17 DIAGNOSIS — M54.2 CHRONIC NECK PAIN: ICD-10-CM

## 2020-07-17 DIAGNOSIS — F11.90 CHRONIC, CONTINUOUS USE OF OPIOIDS: ICD-10-CM

## 2020-08-18 ENCOUNTER — COMMUNICATION - HEALTHEAST (OUTPATIENT)
Dept: FAMILY MEDICINE | Facility: CLINIC | Age: 55
End: 2020-08-18

## 2020-08-18 DIAGNOSIS — F11.90 CHRONIC, CONTINUOUS USE OF OPIOIDS: ICD-10-CM

## 2020-08-18 DIAGNOSIS — G89.29 CHRONIC NECK PAIN: ICD-10-CM

## 2020-08-18 DIAGNOSIS — M54.2 CHRONIC NECK PAIN: ICD-10-CM

## 2020-09-03 ENCOUNTER — OFFICE VISIT - HEALTHEAST (OUTPATIENT)
Dept: FAMILY MEDICINE | Facility: CLINIC | Age: 55
End: 2020-09-03

## 2020-09-03 DIAGNOSIS — F33.2 SEVERE EPISODE OF RECURRENT MAJOR DEPRESSIVE DISORDER, WITHOUT PSYCHOTIC FEATURES (H): ICD-10-CM

## 2020-09-03 DIAGNOSIS — F41.1 GAD (GENERALIZED ANXIETY DISORDER): ICD-10-CM

## 2020-09-21 ENCOUNTER — COMMUNICATION - HEALTHEAST (OUTPATIENT)
Dept: FAMILY MEDICINE | Facility: CLINIC | Age: 55
End: 2020-09-21

## 2020-09-21 DIAGNOSIS — M54.2 CHRONIC NECK PAIN: ICD-10-CM

## 2020-09-21 DIAGNOSIS — F11.90 CHRONIC, CONTINUOUS USE OF OPIOIDS: ICD-10-CM

## 2020-09-21 DIAGNOSIS — G89.29 CHRONIC NECK PAIN: ICD-10-CM

## 2020-10-22 ENCOUNTER — COMMUNICATION - HEALTHEAST (OUTPATIENT)
Dept: FAMILY MEDICINE | Facility: CLINIC | Age: 55
End: 2020-10-22

## 2020-10-22 DIAGNOSIS — G89.29 CHRONIC NECK PAIN: ICD-10-CM

## 2020-10-22 DIAGNOSIS — M54.2 CHRONIC NECK PAIN: ICD-10-CM

## 2020-10-22 DIAGNOSIS — F11.90 CHRONIC, CONTINUOUS USE OF OPIOIDS: ICD-10-CM

## 2020-10-26 ENCOUNTER — COMMUNICATION - HEALTHEAST (OUTPATIENT)
Dept: FAMILY MEDICINE | Facility: CLINIC | Age: 55
End: 2020-10-26

## 2020-10-26 DIAGNOSIS — G89.29 CHRONIC NECK PAIN: ICD-10-CM

## 2020-10-26 DIAGNOSIS — F11.90 CHRONIC, CONTINUOUS USE OF OPIOIDS: ICD-10-CM

## 2020-10-26 DIAGNOSIS — M54.2 CHRONIC NECK PAIN: ICD-10-CM

## 2020-10-28 ENCOUNTER — OFFICE VISIT - HEALTHEAST (OUTPATIENT)
Dept: FAMILY MEDICINE | Facility: CLINIC | Age: 55
End: 2020-10-28

## 2020-10-28 ENCOUNTER — COMMUNICATION - HEALTHEAST (OUTPATIENT)
Dept: FAMILY MEDICINE | Facility: CLINIC | Age: 55
End: 2020-10-28

## 2020-10-28 DIAGNOSIS — F11.90 CHRONIC, CONTINUOUS USE OF OPIOIDS: ICD-10-CM

## 2020-10-28 DIAGNOSIS — G89.4 CHRONIC PAIN SYNDROME: ICD-10-CM

## 2020-10-28 DIAGNOSIS — M54.2 CHRONIC NECK PAIN: ICD-10-CM

## 2020-10-28 DIAGNOSIS — F41.1 GAD (GENERALIZED ANXIETY DISORDER): ICD-10-CM

## 2020-10-28 DIAGNOSIS — G89.29 CHRONIC NECK PAIN: ICD-10-CM

## 2020-10-28 DIAGNOSIS — F17.210 CIGARETTE NICOTINE DEPENDENCE WITHOUT COMPLICATION: ICD-10-CM

## 2020-10-28 DIAGNOSIS — F33.2 SEVERE EPISODE OF RECURRENT MAJOR DEPRESSIVE DISORDER, WITHOUT PSYCHOTIC FEATURES (H): ICD-10-CM

## 2020-12-01 ENCOUNTER — COMMUNICATION - HEALTHEAST (OUTPATIENT)
Dept: FAMILY MEDICINE | Facility: CLINIC | Age: 55
End: 2020-12-01

## 2020-12-01 DIAGNOSIS — F11.90 CHRONIC, CONTINUOUS USE OF OPIOIDS: ICD-10-CM

## 2020-12-01 DIAGNOSIS — G89.29 CHRONIC NECK PAIN: ICD-10-CM

## 2020-12-01 DIAGNOSIS — M54.2 CHRONIC NECK PAIN: ICD-10-CM

## 2020-12-10 ENCOUNTER — OFFICE VISIT - HEALTHEAST (OUTPATIENT)
Dept: FAMILY MEDICINE | Facility: CLINIC | Age: 55
End: 2020-12-10

## 2020-12-10 DIAGNOSIS — F17.210 CIGARETTE NICOTINE DEPENDENCE WITHOUT COMPLICATION: ICD-10-CM

## 2020-12-10 DIAGNOSIS — G89.4 CHRONIC PAIN SYNDROME: ICD-10-CM

## 2020-12-10 DIAGNOSIS — F33.2 SEVERE EPISODE OF RECURRENT MAJOR DEPRESSIVE DISORDER, WITHOUT PSYCHOTIC FEATURES (H): ICD-10-CM

## 2020-12-10 DIAGNOSIS — F41.1 GAD (GENERALIZED ANXIETY DISORDER): ICD-10-CM

## 2020-12-16 ENCOUNTER — COMMUNICATION - HEALTHEAST (OUTPATIENT)
Dept: FAMILY MEDICINE | Facility: CLINIC | Age: 55
End: 2020-12-16

## 2020-12-16 DIAGNOSIS — M54.2 CHRONIC NECK PAIN: ICD-10-CM

## 2020-12-16 DIAGNOSIS — G89.29 CHRONIC NECK PAIN: ICD-10-CM

## 2020-12-16 DIAGNOSIS — F11.90 CHRONIC, CONTINUOUS USE OF OPIOIDS: ICD-10-CM

## 2021-02-22 ENCOUNTER — COMMUNICATION - HEALTHEAST (OUTPATIENT)
Dept: ADMINISTRATIVE | Facility: CLINIC | Age: 56
End: 2021-02-22

## 2021-05-24 ENCOUNTER — RECORDS - HEALTHEAST (OUTPATIENT)
Dept: ADMINISTRATIVE | Facility: CLINIC | Age: 56
End: 2021-05-24

## 2021-05-25 ENCOUNTER — RECORDS - HEALTHEAST (OUTPATIENT)
Dept: ADMINISTRATIVE | Facility: CLINIC | Age: 56
End: 2021-05-25

## 2021-05-26 ENCOUNTER — RECORDS - HEALTHEAST (OUTPATIENT)
Dept: ADMINISTRATIVE | Facility: CLINIC | Age: 56
End: 2021-05-26

## 2021-05-26 ASSESSMENT — PATIENT HEALTH QUESTIONNAIRE - PHQ9: SUM OF ALL RESPONSES TO PHQ QUESTIONS 1-9: 21

## 2021-05-27 ENCOUNTER — RECORDS - HEALTHEAST (OUTPATIENT)
Dept: ADMINISTRATIVE | Facility: CLINIC | Age: 56
End: 2021-05-27

## 2021-05-27 ASSESSMENT — PATIENT HEALTH QUESTIONNAIRE - PHQ9
SUM OF ALL RESPONSES TO PHQ QUESTIONS 1-9: 22
SUM OF ALL RESPONSES TO PHQ QUESTIONS 1-9: 25

## 2021-05-27 NOTE — TELEPHONE ENCOUNTER
[facility] from [facility] requesting Prior Authorization    Medication Name: Lunesta 2mg tablet    Insurance Plan: [employer]   PBM: [facility]   Patient ID Number: [id number]    Please start PA process

## 2021-05-27 NOTE — TELEPHONE ENCOUNTER
Medication Request  Medication name: Hydrocodone  Pharmacy Name and Location: [facility]  Reason for request: refill  When did you use medication last?:  4/2/19  Patient offered appointment:  no  Okay to leave a detailed message: yes

## 2021-05-27 NOTE — TELEPHONE ENCOUNTER
Central PA team  [phone]  Pool: HE PA MED ([id number])          PA has been initiated.       PA form completed and faxed insurance via Cover My Meds     Key:  [id number] - PA Case ID: [id number]     Medication:  Eszopiclone 2MG OR TABS    Insurance:  [facility]        Response will be received via fax and may take up to 5-10 business days depending on plan

## 2021-05-27 NOTE — PROGRESS NOTES
Family Medicine Office Visit  [facility]  Patient Name: Divina Blue  Patient Age: 53 y.o.  YOB: 1965  MRN: [id number]    Date of Visit: 2019  Reason for Office Visit:   Chief Complaint   Patient presents with     Follow-up     depression meds- clarify            Assessment / Plan / Medical Decision Makin. Visit for screening mammogram  - Mammo Screening Bilateral; Future    2. Screen for colon cancer  - Cologuard    3. DONNY (generalized anxiety disorder)  Discussed changing medication.  Will try celexa and see if any improvement.  Will do referral to psychiatry.  Patient advised if symptoms persist, worsen or new symptoms arise they are to seek medical care.  All patients questions addressed. Patient verbalized understanding and agreement with plan.       4. Severe episode of recurrent major depressive disorder, without psychotic features (H)  - PHQ9 Depression Screen  - Ambulatory referral to Psychiatry  - citalopram (CELEXA) 20 MG tablet; Take 1 tablet (20 mg total) by mouth daily.  Dispense: 30 tablet; Refill: 2    5. Cigarette nicotine dependence without complication  Discussed smoking cessation and will call in patches to use.  - nicotine (NICODERM CQ) 21 mg/24 hr; Place 1 patch on the skin daily.  Dispense: 30 patch; Refill: 0    6. Insomnia, unspecified type  Trial of lunesta as pt now failed, rmirtazapine, trazodone, benadryl and melatonin and seroquel  - eszopiclone (LUNESTA) 2 MG Tab; Take 1 tablet (2 mg total) by mouth at bedtime. Take immediately before bedtime  Dispense: 30 tablet; Refill: 1    7. Other specified hearing loss of right ear, unspecified hearing status on contralateral side  Trial of allergy medication and will get audiology referral  - cetirizine (ZYRTEC) 10 MG tablet; Take 1 tablet (10 mg total) by mouth daily.  Dispense: 30 tablet; Refill: 2  - Ambulatory referral to Audiology        [facility]  Maintenance   Topic Date Due     MAMMOGRAM  1965     ADVANCE DIRECTIVES DISCUSSED WITH PATIENT  11/30/1982     PAP SMEAR  11/30/1994     COLOGUARD  11/30/2014     INFLUENZA VACCINE RULE BASED (1) 08/01/2018     DEPRESSION FOLLOW UP  04/13/2019     TD 18+ HE  03/06/2022     TDAP ADULT ONE TIME DOSE  Completed         I have discontinued Divina Blue's mirtazapine, escitalopram oxalate, and FLUoxetine. I am also having her start on citalopram, nicotine, eszopiclone, and cetirizine. Additionally, I am having her maintain her SUMAtriptan, mometasone-formoterol, albuterol, hydrOXYzine pamoate, HYDROcodone-acetaminophen, and hydrOXYzine HCl.      HPI:  Divina Blue is a 53 y.o. year old who presents to the office today for problems with sleep.  Taking 2-3 hours to fall asleep and then waking up througout the night multiple times.  Started itching and taking vistaril once a day.  Feels like the lexapro isn't working at all.  Waking up in the am with NIX.  Eating all the time.  Stopped the prozac and taking lexapro as was directed.  Still having panic attacks.  On lexapro 3 weeks.  Hx of being on seroquel and trazodone and neither worked.  More irritable.    No therapist.  No psychiatrist.  Problems hearing out of the right ear for the past month.  Feels like no hearing at all.  No pain, no plugged, no popping noises, no rhinorrhea or coughing    Still smoking and needs to stop prior to neck or back surgery.  Smoking about 1-2 PPD.  Not interested in quitting but would like to get surgery so willing to try.        Review of Systems- pertinent positive in bold:  Constitutional: Fever, chills, night sweats, fainting, weight change, fatigue, seizures, dizziness, sleeping difficulties, loud snoring/pauses in breathing  Eyes: change in vision, blurred or double vision, redness/eye pain  Ears, nose, mouth, throat: change in hearing, ear pain, hoarseness, difficulty swallowing, sores in the mouth or throat  Respiratory:  shortness of breath, cough, bloody sputum, wheezing  Cardiovascular: chest pain, palpitations   Gastrointestinal: abdominal pain, heartburn/indigestion, nausea/vomiting, change in appetite, change in bowel habits, constipation or diarrhea, rectal bleeding/dark stools, difficulty swallowing  Urinary: painful urination, frequent urination, urinary urgency/incontinence, blood in urine/dark urine, nocturia  Musculoskeletal: backache/back pain (new or increasing), weakness, joint pain/stiffness (new or increasing), muscle cramps, swelling of hands, feet, ankles, leg pain/redness  Skin: change in moles/freckles, rash, nodules  Hematologic/lymphatic: swollen lymph glands, abnormal bruising/bleeding  Endocrine: excessive thirst/urination, cold or heat intolerance  Neurologic/emotional: worrisome memory change, numbness/tingling, anxiety, mood swings      Current Scheduled Meds:  Outpatient Encounter Medications as of 4/9/2019   Medication Sig Dispense Refill     albuterol (PROAIR HFA;PROVENTIL HFA;VENTOLIN HFA) 90 mcg/actuation inhaler Inhale 2 puffs every 6 (six) hours as needed for wheezing. 1 each 11     HYDROcodone-acetaminophen (NORCO )  mg per tablet TAKE ONE TABLET BY MOUTH EVERY 6 HOURS AS NEEDED FOR PAIN 120 tablet 0     hydrOXYzine pamoate (VISTARIL) 50 MG capsule TAKE ONE CAPSULE BY MOUTH THREE TIMES A DAY AS NEEDED FOR ANXIETY 90 capsule 1     mometasone-formoterol (DULERA) 100-5 mcg/actuation HFAA inhaler Inhale 2 puffs 2 (two) times a day. 1 Inhaler 3     SUMAtriptan (IMITREX) 50 MG tablet Take 1 tablet (50 mg total) by mouth once as needed for migraine. 30 tablet 2     [DISCONTINUED] mirtazapine (REMERON) 15 MG tablet Take 1 tablet (15 mg total) by mouth at bedtime. 30 tablet 2     cetirizine (ZYRTEC) 10 MG tablet Take 1 tablet (10 mg total) by mouth daily. 30 tablet 2     citalopram (CELEXA) 20 MG tablet Take 1 tablet (20 mg total) by mouth daily. 30 tablet 2     eszopiclone (LUNESTA) 2 MG Tab  Take 1 tablet (2 mg total) by mouth at bedtime. Take immediately before bedtime 30 tablet 1     hydrOXYzine HCl (ATARAX) 25 MG tablet Take 1 tablet (25 mg total) by mouth 3 (three) times a day as needed for itching. 90 tablet 1     nicotine (NICODERM CQ) 21 mg/24 hr Place 1 patch on the skin daily. 30 patch 0     [DISCONTINUED] escitalopram oxalate (LEXAPRO) 10 MG tablet Take 1 tablet (10 mg total) by mouth daily. 30 tablet 2     [DISCONTINUED] FLUoxetine (PROZAC) 20 MG capsule Take 1 capsule (20 mg total) by mouth daily. 7 capsule 0     No facility-administered encounter medications on file as of 2019.      Past Medical History:   Diagnosis Date     Arthritis      Depression      Past Surgical History:   Procedure Laterality Date     BACK SURGERY        SECTION      x 4     TONSILLECTOMY       Social History     Tobacco Use     Smoking status: Current Every Day Smoker     Packs/day: 1.00     Years: 33.00     Pack years: 33.00     Types: Cigarettes     Start date: 1980     Smokeless tobacco: Never Used     Tobacco comment: Rolls her own cigarettes- goes through ~2lbs per month   Substance Use Topics     Alcohol use: No     Frequency: Never     Drug use: No       Objective / Physical Examination:  Vitals:    19 1549   BP: 116/72   Patient Site: Right Arm   Patient Position: Sitting   Cuff Size: Adult Regular   Pulse: 87   SpO2: 97%   Weight: 196 lb 1.6 oz (89 kg)     Wt Readings from Last 3 Encounters:   19 196 lb 1.6 oz (89 kg)   19 191 lb (86.6 kg)   19 195 lb (88.5 kg)     BP Readings from Last 3 Encounters:   19 116/72   19 129/71   19 130/80     Body mass index is 38.3 kg/m .   Results for orders placed or performed in visit on 19   Drug Abuse 1+, Urine   Result Value Ref Range    Amphetamines Screen Negative Screen Negative    Benzodiazepines Screen Negative Screen Negative    Opiates (!) Screen Negative     Screen Positive (Confirmation available  on request)    Phencyclidine Screen Negative Screen Negative    THC Screen Negative Screen Negative    Barbiturates Screen Negative Screen Negative    Cocaine Metabolite Screen Negative Screen Negative    Methadone Screen Negative Screen Negative    Oxycodone Screen Negative Screen Negative    Creatinine, Urine 67.0 mg/dL           General Appearance: Alert and oriented, cooperative, affect appropriate, speech clear, in no apparent distress  Head: Normocephalic, atraumatic  Ears: Tympanic membrane clear with landmarks well visualized bilaterally  Eyes: PERRL, fundi appear clear bilaterally. EOMI. Conjunctivae clear and sclerae non-icteric  Nose: Septum midline, nares patent, no visible polyps, mucosa moist and without drainage  Throat: Lips and mucosa moist. Teeth in good repair, pharynx without erythema or exudate  Neck: Supple, trachea midline. No cervical adenopathy  Back: Symmetrical and nontender  Lungs: Clear to auscultation bilaterally. Normal inspiratory and expiratory effort  Cardiovascular: Regular rate, normal S1, S2. No murmurs, rubs, or gallops  Abdomen: Bowel sounds active all four quadrants. Soft, non-tender. No hepatomegaly or splenomegaly. No bruits detected.   Extremities: Pulses 2+ and equal throughout. No edema. Strength equal throughout.  Integumentary: Warm and dry. Without suspicious looking lesions  Neuro: Alert and oriented, follows commands appropriately.     Orders Placed This Encounter   Procedures     Mammo Screening Bilateral     Ambulatory referral to Psychiatry     Ambulatory referral to Audiology     Cologuard     PHQ9 Depression Screen   Followup: Return in about 1 month (around 5/7/2019). earlier if needed.    Total time spent with patient was 45 min with >50% of time spent in face-to-face counseling regarding the above plan       Benita Rivera MD

## 2021-05-27 NOTE — PATIENT INSTRUCTIONS - HE
Stop lexapro immediately.  Start celexa today and then return to clinic in 4 weeks to see how you are doing.

## 2021-05-27 NOTE — TELEPHONE ENCOUNTER
Pt is also requesting refill on Hydroxizine Pamoate - this med is no longer available - pharmacy requesting to change med to Atarax instead.      Last OV  19    Assessment / Plan / Medical Decision Makin. Chronic radicular pain of lower back  Follow up with neurosurgery as directed.  Filled out paperwork for [employer]     2. Chronic neck pain  Continue with pain medications.  Reviewed      3. Severe episode of recurrent major depressive disorder, without psychotic features (H)  Stop the 40mg of prozac.  Take 20mg for the next week and then trial of lexapro     4. DONNY (generalized anxiety disorder)  Trial of remeron to help with sleep as seroquel and trazodone did not work     5. Cigarette nicotine dependence without complication  Encouraged smoking cessation    Next OV 19

## 2021-05-28 ENCOUNTER — RECORDS - HEALTHEAST (OUTPATIENT)
Dept: ADMINISTRATIVE | Facility: CLINIC | Age: 56
End: 2021-05-28

## 2021-05-28 ASSESSMENT — ANXIETY QUESTIONNAIRES
GAD7 TOTAL SCORE: 18
GAD7 TOTAL SCORE: 21

## 2021-05-28 NOTE — PROGRESS NOTES
Divina is here to f/u on EMG results and x-ray. She states that symptoms are unchanged for the most part. Her pain had become very bad a few times and could not lift her head because it was so painful and other days did not have that much pain.   Noemi,CMA

## 2021-05-28 NOTE — TELEPHONE ENCOUNTER
Controlled Substance Refill Request  Medication:   Requested Prescriptions     Pending Prescriptions Disp Refills     HYDROcodone-acetaminophen (NORCO )  mg per tablet [Pharmacy Med Name: HYDROCODONE-ACETAMINOPH  TABS] 120 tablet 0     Sig: TAKE ONE TABLET BY MOUTH EVERY 6 HOURS AS NEEDED FOR PAIN     Date Last Fill: 4/2/19  [facility]: [facility]   Submit electronically to pharmacy  Controlled Substance Agreement on File:   Encounter-Level CSA Scan Date:    There are no encounter-level csa scan date.       Last office visit: Last office visit pertaining to requested medication was 4/9/19.

## 2021-05-28 NOTE — TELEPHONE ENCOUNTER
Pt is requesting Nicotine free smoking cessation. Below is the note from neurosurgery:   Talked to patient regarding the nicotine tests and PA process regarding her surgery. I let her know that we cannot submit for the prior authorization until she is completely nicotine free. Patient let me know that currently she is using nicotine patches, which doesn't help the nicotine free process. I advised her to call her PCP to see if she would be willing to prescribe a nicotine free smoking cessation product so we can start the nicotine free process in terms of lab work. She was in agreement.      Her and I will stay in close contact regarding nicotine labs, surgery dates, etc.       Please advise.

## 2021-05-28 NOTE — TELEPHONE ENCOUNTER
Medication Question or Clarification  Who is calling: Patient  What medication are you calling about? (include dose and sig) Patient's medical staff is asking if she can go on amedication to stop smoking like Chantix instead of nicotine patches.  She wants to stop smoking completely faster then the step down process on patches.  Please advise.   Who prescribed the medication?: NA  What is your question/concern?: see above  Pharmacy: [facility]  Okay to leave a detailed message?: Yes [phone]  Site CMT - Please call the pharmacy to obtain any additional needed information.

## 2021-05-28 NOTE — PROGRESS NOTES
"Speech Language/Pathology  Videofluoroscopic Swallow Study     Problem:  Patient Active Problem List   Diagnosis     Chronic radicular pain of lower back     Chronic neck pain     Severe episode of recurrent major depressive disorder, without psychotic features (H)     DONNY (generalized anxiety disorder)     Insomnia, unspecified type     Cigarette nicotine dependence without complication     SOB (shortness of breath)       Onset Date: 5/5/2019 (order date)  Reason for Evaluation: Assess for dysphagia prior to possible cervical spine surgery  Pertinent History:  As above. Patient is considering C4-C7 anterior cervical discectomy and fusion. Per Dr. Ames's recent clinic note dated 4/24/19, \"In light of the risk of dysphasia with an anterior cervical approach, I recommended that she have a swallow evaluation given her complaint of baseline swallow dysfunction/globus sensation to ensure no significant underlying pathology that would be aggravated or significantly worsened by the surgery.\"  Current Diet: Regular textures and thin liquids  Baseline Diet: Regular textures and thin liquids    Patient is a 53-year-old female referred due to concerns of dysphagia.  Patient states, \"I choke easy and it feels like it [food] gets stuck and it's painful sometimes.\"  When asked to elaborate on what she means by \"choking\", patient reports that certain foods (e.g., meats, breads, pasta) tend to \"stick\" in her throat.  She gestures along her sternum to indicate where this occurs.  Patient denies any history of GERD and states that she is not taking any medication for this at present.  The purpose of this study is to evaluate the physiology & function of the oropharyngeal swallow and establish swallowing strategies as appropriate.  Patient also participated in an esophagram during this visit.  Please refer to dedicated report for details.    Patient presents as alert and cooperative during this evaluation.  She arrived to study " alone.  An  was not applicable    Patient was given thin and pureed consistencies of barium.    Oral Phase:    Dentition/Oral hygiene:  Patient is missing multiple upper and lower teeth.  Oral hygiene was adequate.    Oral motor function was not impaired.     Bolus prep and oral control were not impaired. Piecemeal deglutition was noted with puree consistency.    Anterior-Posterior transit was not impaired.    Premature spillage beyond the base of the tongue did not occur with either consistency.    Tongue base retraction was not impaired.    Oral stasis did not occur with either consistency.    Pharyngeal Phase:    Aspiration did not occur with either consistency.     Mild laryngeal penetration occurred with thin liquid.  This was shallow and transient in nature.    Swallow response was delayed with thin liquid.  This resulted in pourover to the pyriform sinuses.       Epiglottic movement was complete consistently across texture trials.    Pharyngeal stasis did not occur with either consistency.    Pharyngeal constriction was not impaired.    Hyolaryngeal elevation was not impaired.  Hyolaryngeal excursion was not impaired.    Cricopharyngeal function was not impaired.  Patient was noted to have a mildly prominent cricopharyngeus muscle.  Cervical esophageal function was not impaired.    Assessment:    Patient demonstrated no oral dysphagia and no significant pharyngeal dysphagia.    Patient is at low aspiration risk with all intake.    Recommendations:    Plan: Continue current diet of Regular textures and thin liquids    Strategies: Patient to follow standard safe swallowing precautions, including sitting fully upright for all intake, eating slowly, taking small bites/sips, and alternating a bite of food with a sip of liquid.  She may wish to moisten drier foods (e.g., meats) with sauces, gravies, or condiments.      Speech Therapy is not recommended at this time    Referrals: N/A    Reviewed history  of swallow problem with patient and verbally explained roles of SLP and radiologist.  Verbally explained process of VFSS prior to administration of barium.  Verbally explained results and recommendations to patient.  SLP answered questions and stated that patient's referring provider, Dr. Cassi Ames, will be notified of results and have access to this report in the EMR. Patient verbalized understanding.    24 dysphagia minutes    Bella Beckford MA, CCC-SLP

## 2021-05-28 NOTE — TELEPHONE ENCOUNTER
That medication can have significant side effects with mood medication (her anxiety medication).  It can worsen depression thoughts.  If she would like to start it recommend appointment to discuss fully

## 2021-05-28 NOTE — PATIENT INSTRUCTIONS - HE
YOU WERE TAUGHT FOR A C45, C56, AND C67 ANTERIOR FUSION.     WILL ORDER A SWALLOW STUDY AND YOU WILL QUIT SMOKING.     Provided complete information about approaching surgery.  Discussed discharge planning and expected outcomes after surgery as well as follow-ups and restrictions.  Emphasized on stop taking ASA, NSAID's, vitamins and /or OTC herbal supplements within 10 days and any anticoagulant meds within 7 days prior to surgery.  Reminded patient to bring all pertinent films to hospital the day of surgery.    NPO after midnight    Provided written pamphlet about surgery.  Answered all questions.  The  will call patient with all pre-op orders and instructions.  Patient to complete all required diagnostic tests prior to surgery.  If test are not completed this will cancel the surgery; contact the clinic nurses at [phone] if unable to complete test  Using a washcloth and a bottle of provided Hibiclens, wash your body, avoiding your face and genitals. Preferably, shower the night before surgery and the morning of surgery using a half a bottle each time for your whole body shower. If you are unable to take a shower in the morning of surgery, please discuss your options with the nurse at your readiness visit. PATIENT GIVEN SOAP TODAY     Once you are SMOKE/CIGARETTE free AND no longer using nicotine substitutes (the patch, gum, vaping, etc) for TWO WEEKS, please call the office and at that time we will check a nicotine lab test. If this comes back normal, showing that nicotine is out of your system, then we will work to get your surgery scheduled. We will again check the nicotine lab test 1-2 weeks prior to your scheduled surgery.

## 2021-05-28 NOTE — TELEPHONE ENCOUNTER
Last appt: 04/09/19  F/U: 05/07/19 with Dr. Rivera    Instructions     Return in about 1 month (around 5/7/2019).     Stop lexapro immediately.  Start celexa today and then return to clinic in 4 weeks to see how you are doing.

## 2021-05-28 NOTE — TELEPHONE ENCOUNTER
Medication Request  Medication name: Nicotine Free Smoking cessation  Pharmacy Name and Location:  [facility]  Reason for request:  Preparing for surgery  When did you use medication last?:   n/a  Patient offered appointment:  none  Okay to leave a detailed message: yes      Please see below message from Neurologist office

## 2021-05-28 NOTE — PROGRESS NOTES
Neurosurgery Progress Note  04/24/19    A/P: Divina Blue is a 53 y.o. female who has a history of prior L4-S1 TLIF with posterior instrumentation complicated by staph infection who has evidence of adjacent segment disease and chronic low back pain.  She additionally has multilevel cervical spondylosis, cervical kyphosis, cervical spinal stenosis with signs and symptoms of cervical myelopathy as well as cervical radiculopathy.    I reviewed Ms. warren cervical spine imaging and her recent EMG results with her today in clinic.  She has signs and symptoms of cervical myelopathy, as well as a possible C7 radiculopathy although her EMG results were negative.  I recommended a C4-C7 anterior cervical discectomy and fusion in light of her cervical kyphosis.  Prior to this procedure, she will need to be smoke-free and explained that we would perform nicotine testing at 6 weeks prior to surgery as well as it 2 weeks prior to surgery.  Lastly, in light of the risk of dysphasia with an anterior cervical approach, I recommended that she have a swallow evaluation given her complaint of baseline swallow dysfunction/globus sensation to ensure no significant underlying pathology that would be aggravated or significantly worsened by the surgery.  She is in agreement to the above plan.  We will work to get the scheduled when next available.  S:  Deep aching pain- feels it deep in the bones of her arms and thighs. Has pain in the upper back, lower back and pain that goes out to the hips.  Pain last week was limiting range of motion in the neck, arm and it was hurting just to breathe.     6 weeks ago feels like she lost her hearing, fluctuates. Right ear is worse than the left. Saw her PCP who ordered an audiology exam.    Notes numbness of the right arm and face that has been occurring off and on. Extends up into the scalp. Notes that she will awaken with it some mornings. Cannot remember if she had had it on the left side. She does  not numbness and tingling in the bilateral feet. Numbness on the under surface of both arms involving the 4th an 5th digits.     Weakness all over.    Norco 10mg tablets, 4 tabs/day     Aggravating factors: Pain is always worse in the morning     PMH: No interval change  PSH: No interval change    ROS: Awakens in the middle of the night for the pain in the low back that radiates down the right side into the foot.  She complains of a globus sensation with swallowing.  She states this is only with food, it does not bother her when she is taking her pills.  A full 14 point review of systems was otherwise completed and is negative aside from that mentioned above in the HPI    O:  Vitals:    04/24/19 1237   BP: 98/64   Pulse: 73   SpO2: 96%     General: Awake, alert, NAD  HEENT: AT/NC, EOMI, face symmetric, oral mucosa moist and pink  Heart: RRR  Lungs: Nonlabored respirations without accessory muscle use.  Neuro: Awake, alert, speech is clear and content is appropriate. MAEW x 4 with full strength in b/l UE w the exception of mildly diminished  strength bilaterally. In the LE, pt exhibits 4/5 strength with hip flexion bilaterally, the remainder of her LE strength is full and symmetric throughout. Sensation is intact to temperature and LT. No drift.   Coordination: HEATHER in UE demonstrates symmetric slowing without dysdiadochokinesia bilaterally. Gait is mildly antalgic.   Reflexes: 3+ DTR in the UE and patella bilaterally. Right achilles 2+, left is absent. No clonus. Toes downgoing bilaterally.  Musculoskeletal: Negative straight leg raise bilaterally. Negative Tinel sign at the carpal and cubital tunnels bilaterally. Negative Phalen test  Psych: Appropriate mood and affect, pleasant, cooperative, alert and oriented x 3  Skin: No obvious rash or lesion    I personally reviewed and visualized the following radiographic images:    MRI cervical spine 11/12/2018: Patient has multilevel cervical spondylosis most prominent  in the lower cervical spine.  She has reversal of the normal cervical lordosis with a focal point around C5.  At C4-5, there is a broad-based paracentral disc osteophyte complex which appears to abut and mildly deform the ventral spinal cord.  At C5-6, there again are similar findings with an increase in the general amount of stenosis there is also severe right foraminal stenosis.  At C6-7, there is a broad-based disc osteophyte complex with uncovertebral joint hypertrophy and severe foraminal stenosis bilaterally slightly worse on the right than the left.    Cervical spine x-rays 1/29/2019: Lower cervical spondylosis is again noted.  Patient has cervical kyphosis which measures approximately 10 degrees.  No evidence of significant dynamic listhesis.    Upper extremity EMG performed 3/7/2019: There are no focal abnormalities noted on EMG testing, negative for acute peripheral or cervical radiculopathy.    Cassi Ames MD  04/24/19

## 2021-05-28 NOTE — TELEPHONE ENCOUNTER
NATHEN No follow up needed.     Medication Question or Clarification  Who is calling: Patient and her staff Claudia  What medication are you calling about? (include dose and sig) citalopram   Who prescribed the medication?: PCP  What is your question/concern?: The patient wanted to get refill of Celexa and instead was given generic Lexapro that was discontinued on 4/9/19. Writer called pharmacy PharmD states to have patient come back through drive thru and the medication will be exchanged for correct order.  Claudia is aware.   Pharmacy: [facility]  Okay to leave a detailed message?: No  Site CMT - Please call the pharmacy to obtain any additional needed information.

## 2021-05-28 NOTE — TELEPHONE ENCOUNTER
Talked to patient regarding the nicotine tests and PA process regarding her surgery. I let her know that we cannot submit for the prior authorization until she is completely nicotine free. Patient let me know that currently she is using nicotine patches, which doesn't help the nicotine free process. I advised her to call her PCP to see if she would be willing to prescribe a nicotine free smoking cessation product so we can start the nicotine free process in terms of lab work. She was in agreement.     Her and I will stay in close contact regarding nicotine labs, surgery dates, etc.

## 2021-05-29 NOTE — TELEPHONE ENCOUNTER
Controlled Substance Refill Request  Medication:   Requested Prescriptions     Pending Prescriptions Disp Refills     HYDROcodone-acetaminophen (NORCO )  mg per tablet [Pharmacy Med Name: HYDROCODONE-ACETAMINOPH  TABS] 120 tablet 0     Sig: TAKE ONE TABLET BY MOUTH EVERY 6 HOURS AS NEEDED FOR PAIN     Date Last Fill: 4/30/19  Pharmacy: [facility]   Submit electronically to pharmacy    Controlled Substance Agreement on File:   Encounter-Level CSA Scan Date:    There are no encounter-level csa scan date.       Last office visit with primary: 4/9/2019

## 2021-05-29 NOTE — TELEPHONE ENCOUNTER
Orders being requested: shower chair  Reason service is needed/diagnosis: Chronic radicular pain of lower back ICD-10 M54.16, G89.29. Chronic neck pain  ICD-10 M54.2, G89.29    NEEDS TO INCLUDE HEIGHT AND WEIGHT    When are orders needed by: ASAP  Where to send Orders: Fax: [phone]  Okay to leave detailed message?  Yes        Orders being requested: adjustable shower head  Reason service is needed/diagnosis: Chronic radicular pain of lower back ICD-10 M54.16, G89.29. Chronic neck pain  ICD-10 M54.2, G89.29    NEEDS TO INCLUDE HEIGHT AND WEIGHT    When are orders needed by: ASAP  Where to send Orders: Fax: [phone]  Okay to leave detailed message?  Yes

## 2021-05-29 NOTE — TELEPHONE ENCOUNTER
Spoke with pt who stated that a couple of months ago she had stopped taking it and was going to try the lunesta, but insurance would not cover it as she had to try other options. So she went back on the mirtazapine. The reason for stopping it was because of headaches, but has not had them and thinks it was from a different combination of things.

## 2021-05-29 NOTE — TELEPHONE ENCOUNTER
Test Results  Who is calling?:  Patient   Who ordered the test: PCP  Type of test: Lab/ Nicotine test   Date of test: 05/31/19  Where was the test performed:  [facility]  What are your questions/concerns?: Patient is awaiting results   Okay to leave a detailed message?:  Yes

## 2021-05-29 NOTE — TELEPHONE ENCOUNTER
Last appt: 04/09/19  F/U: 05/07/19 with Dr. Rivera patient No showed; Has appt scheduled now 05/31/19 with Dr. Rivera     Instructions      Return in about 1 month (around 5/7/2019).          Stop lexapro immediately.  Start celexa today and then return to clinic in 4 weeks to see how you are doing.

## 2021-05-29 NOTE — PROGRESS NOTES
Attempt 1: Care Guide called patient.  If this patient is returning my call, please transfer to Sofia at ext [phone].      Referral:  Difficulty with family

## 2021-05-29 NOTE — TELEPHONE ENCOUNTER
Patient Returning Call  Reason for call:  Results   Information relayed to patient:  Patient informed of Dr Rivera's recommendation.   Patient has additional questions:  No. Patient verbalized understanding and was transferred to scheduling for a lab only appointment.   If YES, what are your questions/concerns:  N/A  Okay to leave a detailed message?: No call back needed

## 2021-05-29 NOTE — TELEPHONE ENCOUNTER
Recent Results (from the past 240 hour(s))   Nicotine and Metabolites, Urine   Result Value Ref Range    Nicotine 80 (H) <5.0 ng/mL    Cotinine 22 (H) <5.0 ng/mL    Nornicotine 2.7 (H) <2.0 ng/mL    Anabasine <2.0 <2.0 ng/mL

## 2021-05-29 NOTE — TELEPHONE ENCOUNTER
Spoke with pt and informed her that the Rx ( mirtazapine) was denied as refill inappropriate. Med is not on current med list. Pt has upcoming appt on 6.28.19. Pt is confused what she should be doing for sleep.

## 2021-05-29 NOTE — TELEPHONE ENCOUNTER
Last appointment she told me that the mirtazepine didn't work and we tried lunesta.  Is she not taking anything

## 2021-05-29 NOTE — TELEPHONE ENCOUNTER
Medication Request  Medication name: Nicotine patches  Pharmacy Name and Location:  [facility]  Reason for request:  Instead of 2 boxes of the 21 mg the patient would like 1 box of the 14 and one of the 7 if they make it  When did you use medication last?:  n/a  Patient offered appointment:  yes  Okay to leave a detailed message: yes

## 2021-05-29 NOTE — PROGRESS NOTES
Attempt 2: Care Guide called patient.  If this patient is returning my call, please transfer to Sofia at ext [phone].    Phone Number states number not in services    Referral:  Difficulty with family
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
## 2021-05-29 NOTE — TELEPHONE ENCOUNTER
Still elevated and recommend repeating it as she is now off nicotine longer.  Order already placed and just needs to come in for a lab appointment

## 2021-05-29 NOTE — TELEPHONE ENCOUNTER
RN cannot approve Refill Request    RN can NOT refill this medication medication not on med list. Last office visit: 5/31/2019 Benita Rivera MD Last Physical: Visit date not found Last MTM visit: Visit date not found Last visit same specialty: 5/31/2019 Benita Rivera MD.  Next visit within 3 mo: Visit date not found  Next physical within 3 mo: Visit date not found      Soila OLMOS Ronen, [facility] Triage/Med Refill 6/17/2019    Requested Prescriptions   Pending Prescriptions Disp Refills     mirtazapine (REMERON) 15 MG tablet [Pharmacy Med Name: MIRTAZAPINE 15MG TABS] 30 tablet 2     Sig: TAKE ONE TABLET BY MOUTH EVERY NIGHT AT BEDTIME       Tricyclics/Misc Antidepressant/Antianxiety Meds Refill Protocol Passed - 6/14/2019  5:31 PM        Passed - PCP or prescribing provider visit in last year     Last office visit with prescriber/PCP: 5/31/2019 Benita Rivera MD OR same dept: 5/31/2019 Benita Rievra MD OR same specialty: 5/31/2019 Benita Rivera MD  Last physical: Visit date not found Last MTM visit: Visit date not found   Next visit within 3 mo: Visit date not found  Next physical within 3 mo: Visit date not found  Prescriber OR PCP: Benita Rivera MD  Last diagnosis associated with med order: There are no diagnoses linked to this encounter.  If protocol passes may refill for 12 months if within 3 months of last provider visit (or a total of 15 months).

## 2021-05-30 ENCOUNTER — RECORDS - HEALTHEAST (OUTPATIENT)
Dept: ADMINISTRATIVE | Facility: CLINIC | Age: 56
End: 2021-05-30

## 2021-05-30 NOTE — TELEPHONE ENCOUNTER
Refill Approved    Rx renewed per Medication Renewal Policy. Medication was last renewed on 3/6/19.    Kristine Galloway, [facility] Triage/Med Refill 7/29/2019     Requested Prescriptions   Pending Prescriptions Disp Refills     hydrOXYzine pamoate (VISTARIL) 50 MG capsule [Pharmacy Med Name: hydrOXYzine 50MG PAMOATE CAP] 90 capsule 1     Sig: TAKE ONE CAPSULE BY MOUTH THREE TIMES A DAY AS NEEDED FOR ANXIETY       Antihistamine Refill Protocol Passed - 7/29/2019 12:39 PM        Passed - Patient has had office visit/physical in last year     Last office visit with prescriber/PCP: 5/31/2019 Benita Rivera MD OR same dept: 5/31/2019 Benita Rivera MD OR same specialty: 5/31/2019 Benita Rivera MD  Last physical: Visit date not found Last MTM visit: Visit date not found   Next visit within 3 mo: 7/29/2019 Benita Rivera MD  Next physical within 3 mo: Visit date not found  Prescriber OR PCP: Benita Rivera MD  Last diagnosis associated with med order: 1. DONNY (generalized anxiety disorder)  - hydrOXYzine pamoate (VISTARIL) 50 MG capsule [Pharmacy Med Name: hydrOXYzine 50MG PAMOATE CAP]; TAKE ONE CAPSULE BY MOUTH THREE TIMES A DAY AS NEEDED FOR ANXIETY  Dispense: 90 capsule; Refill: 1    If protocol passes may refill for 12 months if within 3 months of last provider visit (or a total of 15 months).

## 2021-05-30 NOTE — PROGRESS NOTES
Called Divina today.  She stated she was sleeping and requested I call a different day.        Referral:  Difficulty with family

## 2021-05-30 NOTE — TELEPHONE ENCOUNTER
Controlled Substance Refill Request  Medication:   Requested Prescriptions     Pending Prescriptions Disp Refills     HYDROcodone-acetaminophen (NORCO )  mg per tablet [Pharmacy Med Name: HYDROCODONE-ACETAMINOPH  TABS] 120 tablet 0     Sig: TAKE ONE TABLET BY MOUTH EVERY 6 HOURS AS NEEDED FOR PAIN     Date Last Fill: 5.29.19  Pharmacy: [facility]   Submit electronically to pharmacy  Controlled Substance Agreement on File:   Encounter-Level CSA Scan Date:    There are no encounter-level csa scan date.       Last office visit: Last office visit pertaining to requested medication was 5.31.19.

## 2021-05-30 NOTE — TELEPHONE ENCOUNTER
Controlled Substance Refill Request  Medication Name:   Requested Prescriptions     Pending Prescriptions Disp Refills     HYDROcodone-acetaminophen (NORCO )  mg per tablet 120 tablet 0     Sig: Take 1 tablet by mouth every 6 (six) hours as needed for pain.     Date Last Fill: 6/27/2019  Pharmacy: [facility]      Submit electronically to pharmacy  Controlled Substance Agreement Date Scanned:   Encounter-Level CSA Scan Date:    There are no encounter-level csa scan date.       Last office visit with prescriber/PCP: 7/29/2019 Benita Rivera MD OR same dept: 7/29/2019 Benita Rivera MD OR same specialty: 7/29/2019 Benita Rivera MD  Last physical: Visit date not found Last MTM visit: Visit date not found

## 2021-05-30 NOTE — TELEPHONE ENCOUNTER
Last appt: 19 with Dr. Miguel GALLARDO./U: 19    Patient instructions from appt with  19    Assessment / Plan / Medical Decision Makin. Screen for colon cancer  - Cologuard     2. Chronic radicular pain of lower back  Will do drug screen and continue with current medication  - Drug Abuse 1+, Urine  - Ambulatory referral to Care Management (Primary Care)     3. Severe episode of recurrent major depressive disorder, without psychotic features (H)  Recommend refill of medication and will do referral to care mangement to help with support  - citalopram (CELEXA) 40 MG tablet; Take 1 tablet (40 mg total) by mouth daily.  Dispense: 30 tablet; Refill: 6  - Ambulatory referral to Care Management (Primary Care)     4. Difficulty with family  Recommend therapy and psychiatry but pt has difficulties with family at home and   - Ambulatory referral to Care Management (Primary Care)     5.  Tobacco use  Now in remission.  Need to follow up with neurosurgery      I have discontinued Divina Blue's nicotine, cetirizine, nicotine, and nicotine. I have also changed her citalopram. Additionally, I am having her maintain her SUMAtriptan, mometasone-formoterol, albuterol, hydrOXYzine pamoate, hydrOXYzine HCl, and HYDROcodone-acetaminophen.

## 2021-05-30 NOTE — PROGRESS NOTES
Care Guide called patient.  If this patient is returning my call please transfer to Sofia Adorno at ext [phone]  PC #[id number] for enrollment.    Priority: Routine Class: Internal Referral    Standing Status: Normal Status: Sent [2]    Ordering User: Benita Rivera MD Department: [facility]/ob    Auth Provider: BENITA RIVERA Enc Provider: Benita Rivera MD    Diagnosis: Chronic radicular pain of lower back  Severe episode of recurrent major depressive disorder, without psychotic features (H)  Difficulty with family      Indications of Use:         Expected Date:        Order Comments:        Sched Instructions:   Patient Instructions:       Visit Types: CCC RN ASSESSMENT      Sched Instruct (Non-Radiology):         Referral Priority: Routine [1]      Additional Comments:        Order Summary Internal Referral, Routine, Primary Care, Continuity of Care

## 2021-05-31 NOTE — PROGRESS NOTES
Result is/are normal.  Cholesterol slightly elevated and recommend low fat diet and exercise.  Continue current medications.  Call if any questions or concerns.

## 2021-05-31 NOTE — PROGRESS NOTES
Family Medicine Office Visit  [facility]  Patient Name: Divina Blue  Patient Age: 53 y.o.  YOB: 1965  MRN: [id number]    Date of Visit: 2019  Reason for Office Visit:   Chief Complaint   Patient presents with     paperwork     metro mobility forms           Assessment / Plan / Medical Decision Makin. Screen for colon cancer  - Cologuard    2. Chronic radicular pain of lower back  Will refill medication, drug screen done, routine labs.  Discussed nicotine in urine and recommend surgery as the ultimate treatment appropriate as per surgery  - Comprehensive Metabolic Panel  - HM1(CBC and Differential)  - Drug Abuse 1+, Urine  - HM1 (CBC with Diff)  - HYDROcodone-acetaminophen (NORCO )  mg per tablet; Take 1 tablet by mouth every 6 (six) hours as needed for pain.  Dispense: 120 tablet; Refill: 0    3. Chronic neck pain  Current treatment plan:  10 mg of norco, 4 times per day (4 pills per day)  Reviewed goals of care.  Benefits continue to outweigh the risks of continued use of opioid analgesia.  Prescription monitoring database has been reviewed and shows no aberrant use.  PEG Pain Screening Tool completed and reviewed and patient continues to demonstrate functional improvement as a result of opioid analgesia.  Reviewed side effects from current therapy.  Plan to follow-up in 1 months  Number of refills allowed prior to follow-up visit: 1    - Comprehensive Metabolic Panel  - HM1(CBC and Differential)  - Drug Abuse 1+, Urine  - HM1 (CBC with Diff)    4. Severe episode of recurrent major depressive disorder, without psychotic features (H)  Discussed taking medication, needing to get surgery, walking and increasing activity more and recommend counseling.  Pt has social workers through the [facility] working with her    5. DONNY (generalized anxiety disorder)  Stable - no changes    6. Routine general medical examination at a [facility]  facility  Routine labs.  - Lipid [place] RANDOM        Health Maintenance Review  Health Maintenance   Topic Date Due     HEPATITIS C SCREENING  1965     PREVENTIVE CARE VISIT  1965     MAMMOGRAM  1965     HIV SCREENING  11/30/1979     ADVANCE CARE PLANNING  11/30/1982     PAP SMEAR  11/30/1994     COLOGUARD  11/30/2014     DEPRESSION FOLLOW UP  04/13/2019     INFLUENZA VACCINE RULE BASED (1) 08/01/2019     TD 18+ HE  03/06/2022     TDAP ADULT ONE TIME DOSE  Completed         I am having Divina Blue maintain her SUMAtriptan, mometasone-formoterol, albuterol, hydrOXYzine HCl, citalopram, mirtazapine, hydrOXYzine pamoate, and HYDROcodone-acetaminophen.      HPI:  Divina Blue is a 53 y.o. year old who presents to the office today for follow up.  Still having neck and back pain.  States not smoking ut vaping.  Rolling own cigarettes that were supposed to be nicotine free and tobacco free.  Quit those Friday 8/9 and scheduled to go have a repeat urine done today to check on nicotine.  Only doing the vape since then.  Still having pain and wanting increase in pain medication.  Divina states that nicotine level has be down in order for insurance to cover the surgery but that as soon as it is down then planning on scheduling. Hurts to lay in bed, weather changes.  Problems walking around the grocery store.  Encouraged to do more walking to help with depression but unable to due to the pain.  PCA at home that doesn't feel like is helping with anything.  Trying to get metro mobility ordered to help get around.      Review of Systems- pertinent positive in bold:  Constitutional: Fever, chills, night sweats, fainting, weight change, fatigue, seizures, dizziness, sleeping difficulties, loud snoring/pauses in breathing  Eyes: change in vision, blurred or double vision, redness/eye pain  Ears, nose, mouth, throat: change in hearing, ear pain, hoarseness, difficulty swallowing, sores in the mouth or  throat  Respiratory: shortness of breath, cough, bloody sputum, wheezing  Cardiovascular: chest pain, palpitations   Gastrointestinal: abdominal pain, heartburn/indigestion, nausea/vomiting, change in appetite, change in bowel habits, constipation or diarrhea, rectal bleeding/dark stools, difficulty swallowing  Urinary: painful urination, frequent urination, urinary urgency/incontinence, blood in urine/dark urine, nocturia  Musculoskeletal: backache/back pain (new or increasing), weakness, joint pain/stiffness (new or increasing), muscle cramps, swelling of hands, feet, ankles, leg pain/redness  Skin: change in moles/freckles, rash, nodules  Hematologic/lymphatic: swollen lymph glands, abnormal bruising/bleeding  Endocrine: excessive thirst/urination, cold or heat intolerance  Neurologic/emotional: worrisome memory change, numbness/tingling, anxiety, mood swings      Current Scheduled Meds:  Outpatient Encounter Medications as of 8/29/2019   Medication Sig Dispense Refill     albuterol (PROAIR HFA;PROVENTIL HFA;VENTOLIN HFA) 90 mcg/actuation inhaler Inhale 2 puffs every 6 (six) hours as needed for wheezing. 1 each 11     citalopram (CELEXA) 40 MG tablet Take 1 tablet (40 mg total) by mouth daily. 30 tablet 6     HYDROcodone-acetaminophen (NORCO )  mg per tablet Take 1 tablet by mouth every 6 (six) hours as needed for pain. 120 tablet 0     hydrOXYzine HCl (ATARAX) 25 MG tablet Take 1 tablet (25 mg total) by mouth 3 (three) times a day as needed for itching. 90 tablet 1     hydrOXYzine pamoate (VISTARIL) 50 MG capsule TAKE ONE CAPSULE BY MOUTH THREE TIMES A DAY AS NEEDED FOR ANXIETY 90 capsule 6     mirtazapine (REMERON) 15 MG tablet Take 1 tablet (15 mg total) by mouth at bedtime. 30 tablet 2     mometasone-formoterol (DULERA) 100-5 mcg/actuation HFAA inhaler Inhale 2 puffs 2 (two) times a day. 1 Inhaler 3     SUMAtriptan (IMITREX) 50 MG tablet Take 1 tablet (50 mg total) by mouth once as needed for  migraine. 30 tablet 2     [DISCONTINUED] HYDROcodone-acetaminophen (NORCO )  mg per tablet Take 1 tablet by mouth every 6 (six) hours as needed for pain. 120 tablet 0     No facility-administered encounter medications on file as of 2019.      Past Medical History:   Diagnosis Date     Arthritis      Depression      Past Surgical History:   Procedure Laterality Date     BACK SURGERY        SECTION      x 4     TONSILLECTOMY       Social History     Tobacco Use     Smoking status: Former Smoker     Packs/day: 1.00     Years: 33.00     Pack years: 33.00     Types: Cigarettes     Start date: 1980     Last attempt to quit: 2019     Years since quittin.2     Smokeless tobacco: Never Used     Tobacco comment: Rolls her own cigarettes- goes through ~2lbs per month   Substance Use Topics     Alcohol use: No     Frequency: Never     Drug use: No       Objective / Physical Examination:  Vitals:    19 1035   BP: 124/76   Patient Site: Right Arm   Patient Position: Sitting   Cuff Size: Adult Regular   Pulse: 74   SpO2: 94%   Weight: 205 lb 12.8 oz (93.4 kg)   Height: 5' (1.524 m)     Wt Readings from Last 3 Encounters:   19 205 lb 12.8 oz (93.4 kg)   19 201 lb 14.4 oz (91.6 kg)   19 204 lb 6.4 oz (92.7 kg)     BP Readings from Last 3 Encounters:   19 124/76   19 116/72   19 126/80     Body mass index is 40.19 kg/m .   Results for orders placed or performed in visit on 19   Comprehensive Metabolic Panel   Result Value Ref Range    Sodium 139 136 - 145 mmol/L    Potassium 4.5 3.5 - 5.0 mmol/L    Chloride 106 98 - 107 mmol/L    CO2 24 22 - 31 mmol/L    Anion Gap, Calculation 9 5 - 18 mmol/L    Glucose 98 70 - 125 mg/dL    BUN 10 8 - 22 mg/dL    Creatinine 0.89 0.60 - 1.10 mg/dL    GFR MDRD Af Amer >60 >60 mL/min/1.73m2    GFR MDRD Non Af Amer >60 >60 mL/min/1.73m2    Bilirubin, Total 0.3 0.0 - 1.0 mg/dL    Calcium 10.0 8.5 - 10.5 mg/dL    Protein,  Total 7.7 6.0 - 8.0 g/dL    Albumin 3.8 3.5 - 5.0 g/dL    Alkaline Phosphatase 108 45 - 120 U/L    AST 23 0 - 40 U/L    ALT 28 0 - 45 U/L   Drug Abuse 1+, Urine   Result Value Ref Range    Amphetamines Screen Negative Screen Negative    Benzodiazepines Screen Negative Screen Negative    Opiates (!) Screen Negative     Screen Positive (Confirmation available on request)    Phencyclidine Screen Negative Screen Negative    THC Screen Negative Screen Negative    Barbiturates Screen Negative Screen Negative    Cocaine Metabolite Screen Negative Screen Negative    Methadone Screen Negative Screen Negative    Oxycodone Screen Negative Screen Negative    Creatinine, Urine 147.9 mg/dL   HM1 (CBC with Diff)   Result Value Ref Range    WBC 10.1 4.0 - 11.0 thou/uL    RBC 4.73 3.80 - 5.40 mill/uL    Hemoglobin 13.1 12.0 - 16.0 g/dL    Hematocrit 40.2 35.0 - 47.0 %    MCV 85 80 - 100 fL    MCH 27.7 27.0 - 34.0 pg    MCHC 32.6 32.0 - 36.0 g/dL    RDW 14.1 11.0 - 14.5 %    Platelets 398 140 - 440 thou/uL    MPV 10.2 8.5 - 12.5 fL    Neutrophils % 67 50 - 70 %    Lymphocytes % 21 20 - 40 %    Monocytes % 7 2 - 10 %    Eosinophils % 4 0 - 6 %    Basophils % 1 0 - 2 %    Neutrophils Absolute 6.7 2.0 - 7.7 thou/uL    Lymphocytes Absolute 2.1 0.8 - 4.4 thou/uL    Monocytes Absolute 0.7 0.0 - 0.9 thou/uL    Eosinophils Absolute 0.4 0.0 - 0.4 thou/uL    Basophils Absolute 0.1 0.0 - 0.2 thou/uL   Lipid [place] RANDOM   Result Value Ref Range    Cholesterol 203 (H) <=199 mg/dL    Triglycerides 203 (H) <=149 mg/dL    HDL Cholesterol 29 (L) >=50 mg/dL    LDL Calculated 133 (H) <=129 mg/dL    Patient Fasting > 8hrs? Yes            General Appearance: Alert and oriented, cooperative, affect appropriate, speech clear, in no apparent distress  Head: Normocephalic, atraumatic  Lungs: Clear to auscultation bilaterally. Normal inspiratory and expiratory effort  Cardiovascular: Regular rate, normal S1, S2. No murmurs, rubs, or gallops  Abdomen: Bowel  sounds active all four quadrants. Soft, non-tender. No hepatomegaly or splenomegaly. No bruits detected.   Extremities: Pulses 2+ and equal throughout. No edema. Strength equal throughout.  Integumentary: Warm and dry. Without suspicious looking lesions  Neuro: Alert and oriented, follows commands appropriately.     Orders Placed This Encounter   Procedures     Comprehensive Metabolic Panel     Drug Abuse 1+, Urine     HM1 (CBC with Diff)     Lipid [place] RANDOM     Cologuard   Followup: No follow-ups on file. earlier if needed.    Total time spent with patient was 30 min with >50% of time spent in face-to-face counseling regarding the above plan       Benita Rivera MD

## 2021-05-31 NOTE — TELEPHONE ENCOUNTER
Controlled Substance Refill Request  Medication:   Requested Prescriptions     Pending Prescriptions Disp Refills     HYDROcodone-acetaminophen (NORCO )  mg per tablet [Pharmacy Med Name: HYDROCODONE-ACETAMINOPH  TABS] 120 tablet 0     Sig: TAKE ONE TABLET BY MOUTH EVERY 6 HOURS AS NEEDED FOR PAIN     Date Last Fill: 7/30/19  Pharmacy: [facility]   Submit electronically to pharmacy  Controlled Substance Agreement on File:   Encounter-Level CSA Scan Date:    There are no encounter-level csa scan date.       Last office visit: 7/29/2019 Benita Rivera MD Leslie White, RN BSBA Care Connection Triage/Med Refill 8/24/2019 9:59 PM

## 2021-06-01 NOTE — TELEPHONE ENCOUNTER
Needs controlled substance contract signed - new one.  Needs to come into the office to be signed.  Refilled medication for one month longer but needs to stop smoking.  No further refills until nicotine free

## 2021-06-01 NOTE — TELEPHONE ENCOUNTER
Controlled Substance Refill Request  Medication:   Requested Prescriptions     Pending Prescriptions Disp Refills     mirtazapine (REMERON) 15 MG tablet [Pharmacy Med Name: MIRTAZAPINE 15MG TABS] 30 tablet 2     Sig: TAKE ONE TABLET BY MOUTH ONCE DAILY AT BEDTIME     HYDROcodone-acetaminophen (NORCO )  mg per tablet [Pharmacy Med Name: HYDROCODONE-ACETAMINOPH  TABS] 120 tablet 0     Sig: TAKE ONE TABLET BY MOUTH EVERY 6 HOURS AS NEEDED FOR PAIN     Date Last Fill: 8/30/19  [facility]   Submit electronically to pharmacy  Controlled Substance Agreement on File:   Encounter-Level CSA Scan Date:    There are no encounter-level csa scan date.       Last office visit: Last office visit pertaining to requested medication was 8/29/19.      Rx renewed per Medication Renewal policy  Diya Galloway RN Care Connection Triage/Medication Refill

## 2021-06-01 NOTE — TELEPHONE ENCOUNTER
Refill request: hydrocodoen   Last refill: 8.30.19 disp 120 no refills   Last office visit: 8.29.19

## 2021-06-02 VITALS — BODY MASS INDEX: 38.3 KG/M2 | WEIGHT: 196.1 LBS

## 2021-06-02 VITALS — HEIGHT: 60 IN | WEIGHT: 195 LBS | BODY MASS INDEX: 38.28 KG/M2

## 2021-06-02 VITALS — WEIGHT: 195 LBS | BODY MASS INDEX: 38.28 KG/M2 | HEIGHT: 60 IN

## 2021-06-02 VITALS — WEIGHT: 195.6 LBS | BODY MASS INDEX: 38.4 KG/M2 | HEIGHT: 60 IN

## 2021-06-02 VITALS — BODY MASS INDEX: 37.3 KG/M2 | WEIGHT: 191 LBS

## 2021-06-02 VITALS — WEIGHT: 211 LBS

## 2021-06-02 VITALS — WEIGHT: 202 LBS

## 2021-06-02 NOTE — TELEPHONE ENCOUNTER
Dr. Ames would like to see Divina Blue in follow up. Writer left a message for patient to call back. Please assist her with making a 30 minute appointment with Dr. Ames to discuss next steps per Dr. Ames.   Saul Luque LPN
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
## 2021-06-02 NOTE — TELEPHONE ENCOUNTER
Left message for pt to call back. Pt needs to stop in to sign controlled substance agreement (placed at ) in addition please relay from Dr. Rivera:     Needs to come into the office to be signed.  Refilled medication for one month longer but needs to stop smoking.  No further refills until nicotine free

## 2021-06-02 NOTE — TELEPHONE ENCOUNTER
Last Office Visit  8/29/2019 Benita Rivera MD  Notes:  3. Chronic neck pain  Current treatment plan:  10 mg of norco, 4 times per day (4 pills per day)  Reviewed goals of care.  Benefits continue to outweigh the risks of continued use of opioid analgesia.  Prescription monitoring database has been reviewed and shows no aberrant use.  PEG Pain Screening Tool completed and reviewed and patient continues to demonstrate functional improvement as a result of opioid analgesia.  Reviewed side effects from current therapy.  Plan to follow-up in 1 months  Number of refills allowed prior to follow-up visit: 1    Last Filled:  10/02/19 #120    Next OV:  11/11/2019 Benita Rivera MD    Medication teed up for provider signature

## 2021-06-02 NOTE — TELEPHONE ENCOUNTER
Controlled Substance Refill Request  Medication Name:   Requested Prescriptions     Pending Prescriptions Disp Refills     HYDROcodone-acetaminophen (NORCO )  mg per tablet 120 tablet 0     Sig: Take 1 tablet by mouth every 6 (six) hours as needed for pain.     Date Last Fill: 10/2/19  Pharmacy:  [facility], [address] [place] [address]     Submit electronically to pharmacy   Controlled Substance Agreement Date Scanned:   Encounter-Level CSA Scan Date:    There are no encounter-level csa scan date.       Last office visit with prescriber/PCP: 8/29/2019 Benita Rivera MD OR same dept: 8/29/2019 Benita Rivera MD OR same specialty: 8/29/2019 Benita Rivera MD  Last physical: Visit date not found Last MTM visit: Visit date not found      Patient was transferred to scheduling to make appointment with Provider for med check.

## 2021-06-03 VITALS — BODY MASS INDEX: 38.3 KG/M2 | HEIGHT: 60 IN

## 2021-06-03 VITALS — HEIGHT: 60 IN | BODY MASS INDEX: 39.64 KG/M2 | WEIGHT: 201.9 LBS

## 2021-06-03 VITALS — HEIGHT: 60 IN | BODY MASS INDEX: 40.4 KG/M2 | WEIGHT: 205.8 LBS

## 2021-06-03 VITALS — BODY MASS INDEX: 40.13 KG/M2 | WEIGHT: 204.4 LBS | HEIGHT: 60 IN

## 2021-06-04 VITALS
WEIGHT: 176.3 LBS | HEIGHT: 60 IN | BODY MASS INDEX: 34.61 KG/M2 | DIASTOLIC BLOOD PRESSURE: 82 MMHG | HEART RATE: 94 BPM | OXYGEN SATURATION: 98 % | SYSTOLIC BLOOD PRESSURE: 121 MMHG

## 2021-06-04 VITALS
WEIGHT: 191.1 LBS | DIASTOLIC BLOOD PRESSURE: 74 MMHG | BODY MASS INDEX: 37.52 KG/M2 | HEART RATE: 112 BPM | HEIGHT: 60 IN | OXYGEN SATURATION: 98 % | SYSTOLIC BLOOD PRESSURE: 122 MMHG

## 2021-06-04 VITALS — BODY MASS INDEX: 37.32 KG/M2 | HEIGHT: 60 IN

## 2021-06-04 VITALS
OXYGEN SATURATION: 95 % | WEIGHT: 183 LBS | DIASTOLIC BLOOD PRESSURE: 76 MMHG | HEART RATE: 90 BPM | SYSTOLIC BLOOD PRESSURE: 122 MMHG | HEIGHT: 60 IN | BODY MASS INDEX: 35.93 KG/M2

## 2021-06-05 NOTE — TELEPHONE ENCOUNTER
Who is requesting the letter?  Patient  Why is the letter needed?   Patient is requesting letter stating she would like a Thera-Cane for muscle spasms in her back.    How would you like this letter returned? Please fax letter to Nanda Perez , [phone] and she will get for patient.  Okay to leave a detailed message? Yes

## 2021-06-05 NOTE — TELEPHONE ENCOUNTER
Called to patient and asked if appointment could change from 8:15am to 8:00am per provider wanting 30 min. Patient agreed and appointment was changed to 8:00 am on 2/3/2020

## 2021-06-05 NOTE — TELEPHONE ENCOUNTER
Called to pt and pt states that she was never told that she needed a urine today.  Will come in this week to get it done.  Pt declines scheduling appt because she can not plan when she will need to use the bathroom    Informed pt that she may have a wait if she presents with no appt.  Pt states that is fine

## 2021-06-05 NOTE — TELEPHONE ENCOUNTER
Who is requesting the letter?  Patient  Why is the letter needed?   Patient is requesting a letter stating she needs a new mattress for her hospital bed.  The one she has now is concave.  How would you like this letter returned? Please fax letter to Nanda Perez , [phone] and she will get the mattress for the patient.  Okay to leave a detailed message? Yes

## 2021-06-05 NOTE — PROGRESS NOTES
Positive for opiates but patient should not have had any in her system.  Discussed with her in clinic with MA.  Needs to establish with the pain clinic

## 2021-06-05 NOTE — TELEPHONE ENCOUNTER
Yes pt needs to come back to have it done - will change to future order.  Needs to be done this week

## 2021-06-05 NOTE — TELEPHONE ENCOUNTER
Called to pt and pt informed that she should see her Neuro Dr for this request -said that she doesn't see him any time soon - informed that she can call them to request as well.  This is available OTC and she can purchase - states that she can not afford it.  Pt will call neuro MD for suggestions

## 2021-06-05 NOTE — TELEPHONE ENCOUNTER
Pt stopped in to get clarification in regards of the prescription prescribed and picked up (Hydrocodone). She expressed concern that she did not realize that Miguel was given the 5-325 instead of the 10.     I spoke with Miguel while pt waited to get more background. Miguel advised that pt is only getting this prescription, signed a controlled substance agreement and needs to be managed by pain clinic (referral has been entered).     Explained to pt what Miguel reviewed with me.

## 2021-06-05 NOTE — TELEPHONE ENCOUNTER
Medication Question or Clarification  Who is calling: patient   What medication are you calling about (include dose and sig)?:    Disp Refills Start End    HYDROcodone-acetaminophen 5-325 mg per tablet 90 tablet 0 2/3/2020     Sig - Route: Take 1 tablet by mouth every 8 (eight) hours as needed for pain. - Oral    Sent to pharmacy as: HYDROcodone 5 mg-acetaminophen 325 mg tablet    Earliest Fill Date: 2/3/2020    E-Prescribing Status: Receipt confirmed by pharmacy (2/3/2020  8:42 AM CST)        Who prescribed the medication?: Benita Rivera MD   What is your question/concern?: caller is wondering if provider had sent in the wrong medication to the pharmacy caller stated that she got the 5 mg instead of 10 mg. Caller is wondering if she should bring in the medication to the pharmacy.   Requested Pharmacy: [facility] to leave a detailed message?: Yes  [phone]

## 2021-06-05 NOTE — TELEPHONE ENCOUNTER
Last Office Visit  2/3/2020 Benita Rivera MD  Notes:    4. Chronic neck pain  Recommend continue to see neurosurgery.  Referral placed by them for pain management which I think would be beneficial to patient to adequately control pain     5. Chronic, continuous use of opioids  Drug screen done today and controlled substance contract signed.  - Drug Abuse 1+, Urine  - HYDROcodone-acetaminophen 5-325 mg per tablet; Take 1 tablet by mouth every 8 (eight) hours as needed for pain.  Dispense: 90 tablet; Refill: 0    Next OV:  03/02/2020 Benita Rivera MD        order teed up for provider signature
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
## 2021-06-05 NOTE — PROGRESS NOTES
Divina is here to discuss pain medication prescription and scheduling a surgery. Pt states her symptoms are getting worse as in intensity. Pt's primary offered her a new pain contract in October 2019 but pt had to also agree to quit smoking. She was given one more pain med refill. Pt has not gone in to see primary yet for that.   Elisha,CMA
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
## 2021-06-05 NOTE — PROGRESS NOTES
Referral received to the pain center.  Unfortunately patient has Medicare which does not cover the undersigned.  It appears her significant mental health concerns such that her midlevel providers do not want to see the patient until patient is established with psychiatry as well.  Will inform the referring providers

## 2021-06-05 NOTE — PATIENT INSTRUCTIONS - HE
Referral to Pain clinic,  Referral to Psychiatry.     MRI of cervical and thoracic.     Quit smoking, once nicotine free for 2 months then we can do a nicotine lab.

## 2021-06-06 NOTE — TELEPHONE ENCOUNTER
Done previously.  Please send note from 2/3 with the documentation and letter from 2/6  
Last office visit 3/3/2020- Med check    Last office visit regarding hospital bed on 12/31/2018  1. Chronic radicular pain of lower back  Current treatment plan:  10 mg of norco, 4 times per day (4 pills per day)  Reviewed goals of care.  Benefits continue to outweigh the risks of continued use of opioid analgesia.  Prescription monitoring database has been reviewed and shows no aberrant use.  PEG Pain Screening Tool completed and reviewed and patient continues to demonstrate functional improvement as a result of opioid analgesia.  Reviewed side effects from current therapy.  Plan to follow-up in 1 months  Number of refills allowed prior to follow-up visit: 0     Continue with PT.  Patient is to call if need any further services.  She believes she has home health already scheduled to come in and start with PT at home.  2. Chronic neck pain  Follow up with neurosurgery as indicated  Pt is in need of a bed to help with the alleviation of pain and positioning of the body.  Pt is requiring frequent changes in body positioning due to the chronic neck and back pain.  For this I recommend a fixed height bed.    Letter written for mattress on 2/06/2020  Needing office visit notes and diagnosis code for hospital bed mattress. Does patient need a more recent face to face for mattress for more recent notes to be faxed?  
Notes and letter faxed [facility]  Fax number [phone]  
Who is calling:  Ana Jeffery   Reason for Call:  Caller is requesting to fax office visit notes and Diagnosis code for Hospital bed mattress to [facility]  Fax number [phone]  Date of last appointment with primary care: 03/03/2020  Okay to leave a detailed message: No      
normal...

## 2021-06-06 NOTE — TELEPHONE ENCOUNTER
Called pt to let her know that her Cervical and Thoracic MRI were stable, per Dr. Ames, and that she would like her to repeat her Lumbar MRI. Pt understood.   MARCIANO Tello

## 2021-06-07 NOTE — TELEPHONE ENCOUNTER
Who is calling:  Linda/[facility]  Reason for Call:  Patient is interested in the Nicortral inhaler 10 mg, which will need prior authorization, Linda just wanted you to be aware that it may come up in the future.   Date of last appointment with primary care: 3/2/20  Okay to leave a detailed message: Yes

## 2021-06-07 NOTE — TELEPHONE ENCOUNTER
DAVID for patient to call the clinic regarding her appointment Thursday.  CARLIN Anglin wondering if patient has a private place to contact a visit via telephone.

## 2021-06-07 NOTE — PROGRESS NOTES
Patient will need to come in for face-to-face visit as she is unable to do a video call.  Unable to assess without visual interview and also due to patient reporting that she is unable to share all information Due to lack of privacy at home.  Patient however feels safe.  Crisis numbers have been provided for her.  She denies suicidal homicidal ideations.  She says she feels safe and verbally contracts for safety.  She has a government phone and has no video ability to do an intake interview.  Did speak to patient who relayed this information.  Staff will call her to reschedule an appointment.  I understand a face-to-face appointment has been scheduled for patient to come into the clinic next week on Thursday.

## 2021-06-07 NOTE — TELEPHONE ENCOUNTER
Spoke with pt and scheduled for Thursday May 14th for telephone visit. Pt wanted to know if you could refill meds or bridge until appointment?

## 2021-06-07 NOTE — PROGRESS NOTES
Medications Phoned  to Pharmacy [] yes [x]no  Name of Pharmacist:  List Medications, including dose, quantity and instructions    Medications ordered this visit were e-scribed.  Verified by order class [] yes  [x] no    Medication changes or discontinuations were communicated to patient's pharmacy: [] yes  [x] no    Future appointment was made: [x] yes  [] No   4/09/2020  Needs Face to Face per provider  Dictation completed at time of chart check: [x] yes  [] no    I have checked the documentation for today s encounters and the above information has been reviewed and completed.

## 2021-06-07 NOTE — PROGRESS NOTES
"Divina Blue is a 54 y.o. female who is being evaluated via a billable telephone visit.      The patient has been notified of following:     \"This telephone visit will be conducted via a call between you and your physician/provider. We have found that certain health care needs can be provided without the need for a physical exam.  This service lets us provide the care you need with a short phone conversation.  If a prescription is necessary we can send it directly to your pharmacy.  If lab work is needed we can place an order for that and you can then stop by our lab to have the test done at a later time.    If during the course of the call the physician/provider feels a telephone visit is not appropriate, you will not be charged for this service.\"     Physician has received verbal consent for a Telephone Visit from the patient? Yes    Divina Blue complains of    Chief Complaint   Patient presents with     Med Check     wants to go back to the 10mg hydrocodone- discuss side effects of wellbutrin        I have reviewed and updated the patient's Past Medical History, Social History, Family History and Medication List.    ALLERGIES  Penicillins; Tetracycline; and Ibuprofen    Additional provider notes:Divina Blue she has been on the Wellbutrin for smoking cessation. She has been smoke free during the day and now is smoking in the evening 1-2, now she is back to smoking 1 pack per day.  She did not have any side effects from this medication, states it did not make her more alert or jittery, it did make her feel a little sleepy during the day so she took it later in the afternoon.  She was also told by her  that she might benefit from a Nicotrol inhaler, she is wondering about getting that as well.  She feels that this will keep her from buying cigarettes.  She also needs her refills of her hydrocodone.  She states that her primary care doctor, Benita Rivera MD was in the process of increasing her " pain medication.  She had been 4 times daily of 10 mg/325 hydrocodone/acetaminophen when she started with her current provider, she was told that she could have 7.5/325 hydrocodone/acetaminophen and that she also needed to go to the pain clinic.  She was also referred to psychiatry.  She states that she is still having difficulty standing due to lumbar disc disease, cannot stand to cook more than about 10 minutes before she has to sit down.  She does not report muscle weakness, numbness, tingling of lower extremities.  Review of systems as in HPI and negative for fever, cough, congestion, malaise, myalgias.    Assessment/Plan:  1. Nicotine use disorder  buPROPion (WELLBUTRIN SR) 150 MG 12 hr tablet    nicotine (NICOTROL) 10 mg inhaler   2. Chronic neck pain  HYDROcodone-acetaminophen (NORCO) 7.5-325 mg per tablet   3. Chronic, continuous use of opioids  HYDROcodone-acetaminophen (NORCO) 7.5-325 mg per tablet     1.  We will increase her Wellbutrin to150 SR twice daily and explained that she can take her current 150 XL's for the next 3 days and then on day 4 go up to the 150 SR's twice daily.  On the seventh day she is to quit smoking.  She was also given 1 inhaler of Nicotrol, explained that she should not take more than 1 puff once daily when she is having severe craving.  She voices understanding.  2 and 3.  Discussed patient's case and reviewed patient's records with FAVIAN Garcia who works with Benita Rivera MD.  Also sent a note through in basket to her primary doctor.  After this review determined that patient has not started the process for pain clinic, she has also established with a psychiatrist.  According to review of the record she was to wean down on Norco.  This medication was refilled at its current dose and any changes in this medication would need to be discussed with her primary doctor, Benita Rivera MD.          Phone call duration:  22 minutes    Radha Summers CMA

## 2021-06-07 NOTE — TELEPHONE ENCOUNTER
Per Merry's directive:    Reach out to Pt and see if she can reschedule for 5/14 or 5/15 for a 60 min consult appointment.  We have not completed her intake at this time.     Pt is unable to have a video visit, does not have the equipment to do a video visit.  Has [employer] phone and is not video   Writer left clinic number and reason for call. Asking for a return call either way.

## 2021-06-07 NOTE — TELEPHONE ENCOUNTER
Who is calling:  Patient   Reason for Call:  Caller is needing to know if she is still schedule to see Psychiatry. Caller would like a call back as soon as possible so she can set up her transportation    Date of last appointment with primary care:   Okay to leave a detailed message: Yes

## 2021-06-07 NOTE — TELEPHONE ENCOUNTER
Central PA team  [phone]  Pool: HE PA MED ([id number])          PA has been initiated.       PA form completed and faxed insurance via Cover My Meds     Key:  [id number]     Medication:  NICOTROL INHALER    Insurance:  [employer]         Response will be received via fax and may take up to 5-10 business days depending on plan

## 2021-06-08 NOTE — TELEPHONE ENCOUNTER
Last Office Visit  5/14/2020 Benita Rivera MD    Notes:  1. Chronic neck pain  Will refill medication.  Urged patient to call surgery and find out the status of post-covid restart to plan when she would be able to get surgery.  Still recommend C4-C7 anterior cervical disectomy and fusion  - HYDROcodone-acetaminophen (NORCO) 7.5-325 mg per tablet; Take 1 tablet by mouth every 8 (eight) hours as needed for pain.  Dispense: 90 tablet; Refill: 0     2. Chronic, continuous use of opioids  Stable.  Continue on current medication  - HYDROcodone-acetaminophen (NORCO) 7.5-325 mg per tablet; Take 1 tablet by mouth every 8 (eight) hours as needed for pain.  Dispense: 90 tablet; Refill: 0     3. Cigarette nicotine dependence without complication  Discussed smoking cessation at length.  Pt does not wish to quit permanently but feels like she is improving with improved home life     4. Severe episode of recurrent major depressive disorder, without psychotic features (H)  Continue to follow up with behavioral health but seems to be improving overall.    Last Filled:  HYDROcodone-acetaminophen (NORCO) 7.5-325 mg per tablet  90 tablet  0  5/14/2020   No    Sig - Route: Take 1 tablet by mouth every 8 (eight) hours as needed for pain. - Oral    Sent to pharmacy as: HYDROcodone 7.5 mg-acetaminophen 325 mg tablet (Norco)    Earliest Fill Date: 5/14/2020    E-Prescribing Status: Receipt confirmed by pharmacy (5/14/2020  9:38 AM CDT)          Lab Results   Component Value Date    AMPHET Screen Negative 02/04/2020    HEBENZODIAZ Screen Negative 02/04/2020    OPIATES (!) 02/04/2020     Screen Positive (Confirmation available on request)    PCP Screen Negative 02/04/2020    THC Screen Negative 02/04/2020    BARBIT Screen Negative 02/04/2020    COCAINEMETAB Screen Negative 02/04/2020    METHADNE Screen Negative 02/04/2020    OXYCODONE (!) 02/04/2020     Screen Positive (Confirmation available on request)    CREAUR 86.1 02/04/2020          Next OV:  Visit date not found      Encounter-Level CSA Scan Date - 10/31/2018:    Scan on 11/2/2018 11:53 AM  Scan on 11/06/2019        reviewed and results are as follows:     pulled and reviewed 4/29/2020    Medication teed up for provider signature - please adjust as appropriate.

## 2021-06-08 NOTE — PATIENT INSTRUCTIONS - HE
Continue medications as prescribed  Have your pharmacy contact us for a refill if you are running low on medications (We may ask you to come into clinic to get a refill from the nurse  No Alcohol or drug use  No driving if sedated  Call the clinic with any questions or concerns   Reach out for help if you feel like hurting yourself or others ([facility] [phone]: [address] [facility], [address] or [facility] Suicide Hotline [phone] , call 911 or go to nearest Emergency room    Follow up as directed, for your appointments, per your After Visit Summary Form.

## 2021-06-08 NOTE — PROGRESS NOTES
"Divina Blue is a 54 y.o. female who is being evaluated via a billable telephone visit.      The patient has been notified of following:     \"This telephone visit will be conducted via a call between you and your physician/provider. We have found that certain health care needs can be provided without the need for a physical exam.  This service lets us provide the care you need with a short phone conversation.  If a prescription is necessary we can send it directly to your pharmacy.  If lab work is needed we can place an order for that and you can then stop by our lab to have the test done at a later time.    Telephone visits are billed at different rates depending on your insurance coverage. During this emergency period, for some insurers they may be billed the same as an in-person visit.  Please reach out to your insurance provider with any questions.    If during the course of the call the physician/provider feels a telephone visit is not appropriate, you will not be charged for this service.\"    Patient has given verbal consent to a Telephone visit? Yes      Patient would like to receive their AVS by AVS Preference: Mail a copy.    Psychiatric  Out patient follow up Progress Note  Date of visit:6/17/2020         Discussion of Care and Treatment Recommendations:   This is a 54 y.o. female with a history of depression and anxiety who presents to the clinic today virtually for follow-up appointment.    Past psychiatric medication include  Vistaril - did not help with anxiety - prescribed by PCP    Prozac- was working    Other antidepressant - cannot remember          Last visit 6/3/2020 Recommendation at last visit.  1. Start Prozac 20 mg daily   2. RTC : 2 weeks call in between visit with nay questions or concerns      Patient and I reviewed diagnosis and treatment plan and patient agrees with following recommendations:  Ongoing education given regarding diagnostic and treatment options with adequate " "verbalization of understanding.  Plan   1. Titrate  Prozac to  40 mg daily   2. RTC : 4 weeks call in between visit with nay questions or concerns          DIagnoses:   MDD  Anxiety       Patient Active Problem List   Diagnosis     Chronic radicular pain of lower back     Chronic neck pain     Severe episode of recurrent major depressive disorder, without psychotic features (H)     DONNY (generalized anxiety disorder)     Insomnia, unspecified type     Cigarette nicotine dependence without complication     SOB (shortness of breath)             Chief Complaint / Subjective:    Chief complaint:\" Nothing much has changed \"     History of Present Illness:   Per patient's statement : She has tolerated Prozac for the past 2 weeks and denies any side effect.  However reports her depression and anxiety have not improved.  During her last visit we started her on Prozac 20 mg with a plan to titrate further abdomen today if she tolerated the medication.  He has tried Prozac in the past and it worked but she fell through the cracks and did not follow through with her appointments and stopped taking it for years.  Today she is open to decreasing further to 40 mg and checking again in 4 weeks.  She denies all other psychiatric issues and offers no new concerns.  She states she feels safe and verbally contracts for safety.  She is meeting with her  from [facility] today to discuss potential community resources available for her.    Mental Status Examination:   General: Alert and oriented x 3   Speech: Normal in rate and tone  Language: Intact  Thought process: Coherent  Thought content:                           Auditory hallucinations-Denies                          Visual Hallucinations - Denies                         Delusions Absent                           Loose Associations:  No                          Suicidal thoughts: Denies                          Homicidal thoughts:Denies                        Affect: " Neutral  Mood: Neutral   Intellectual functioning: Within normal limits  Memory: Within normal limits  Fund of knowledge: Average  Attention and concentration: Within normal limits  Gait: Unable to assess telephone visit  Psychomotor activity: Unable to assess telephone visit  Muscles: Unable to assess telephone visit  InSight and judgment: Fair      Drug/treatment history and current pattern of use:   Patient denies recent or current use of all mood altering substances.*    Medication changes: See Above   Medication adherence: compliant  Medication side effects: absent  Information about medications: Side effects, benefits and alternative treatments discussed and patient agrees .    Psychotherapy: Supportive therapy day-to-day living    Education: Diet, exercise, abstinence from drugs and alcohol, patient will not drive if sedated and medications or  under influence of any substance    Lab Results:  Personally reviewed and discussed with the patient    Lab Results   Component Value Date    WBC 10.1 08/29/2019    HGB 13.1 08/29/2019    HCT 40.2 08/29/2019     08/29/2019    CHOL 203 (H) 08/29/2019    TRIG 203 (H) 08/29/2019    HDL 29 (L) 08/29/2019    ALT 28 08/29/2019    AST 23 08/29/2019     08/29/2019    K 4.5 08/29/2019     08/29/2019    CREATININE 0.89 08/29/2019    BUN 10 08/29/2019    CO2 24 08/29/2019       Vital signs:  There were no vitals taken for this visit.  Unable to assess telephone visit  Allergies: Penicillins; Tetracycline; and Ibuprofen         Medications:     Current Outpatient Medications on File Prior to Visit   Medication Sig Dispense Refill     FLUoxetine (PROZAC) 20 MG capsule Take 1 capsule (20 mg total) by mouth daily. 30 capsule 0     HYDROcodone-acetaminophen (NORCO) 7.5-325 mg per tablet Take 1 tablet by mouth every 8 (eight) hours as needed for pain. 90 tablet 0     SUMAtriptan (IMITREX) 50 MG tablet Take 50 mg by mouth every 2 (two) hours as needed for migraine.        [DISCONTINUED] aspirin 325 MG tablet Take 325 mg by mouth daily.       No current facility-administered medications on file prior to visit.                Coordination of Care:   More than 25 minutes spent on this visit  with more than 50% of time spent on coordination of care including: Educating patient about diagnosis, prognosis, side effects and benefits of medications, diet, exercise.  Time also spent providing supportive therapy regarding above issues.    This note was created using a dictation system. All typing errors or contextual distortion is unintentional and software inherent.    Phone call duration: 25 minutes    Merry Anglin NP

## 2021-06-08 NOTE — PROGRESS NOTES
"Divina Blue is a 54 y.o. female who is being evaluated via a billable telephone visit.      The patient has been notified of following:     \"This telephone visit will be conducted via a call between you and your physician/provider. We have found that certain health care needs can be provided without the need for a physical exam.  This service lets us provide the care you need with a short phone conversation.  If a prescription is necessary we can send it directly to your pharmacy.  If lab work is needed we can place an order for that and you can then stop by our lab to have the test done at a later time.    Telephone visits are billed at different rates depending on your insurance coverage. During this emergency period, for some insurers they may be billed the same as an in-person visit.  Please reach out to your insurance provider with any questions.    If during the course of the call the physician/provider feels a telephone visit is not appropriate, you will not be charged for this service.\"    Patient has given verbal consent to a Telephone visit? Yes    What phone number would you like to be contacted at? [phone]    Patient would like to receive their AVS by AVS Preference: [place].    Family Medicine Office Visit  [facility]  Patient Name: Divina Blue  Patient Age: 54 y.o.  YOB: 1965  MRN: [id number]    Date of Visit: 5/14/2020  Reason for Office Visit:   Chief Complaint   Patient presents with     Med Check     Norco- needs refills            Assessment / Plan / Medical Decision Making:      Health Maintenance Review  Health Maintenance   Topic Date Due     DEPRESSION ACTION PLAN  1965     HEPATITIS C SCREENING  1965     PREVENTIVE CARE VISIT  1965     MAMMOGRAM  1965     HIV SCREENING  11/30/1979     ADVANCE CARE PLANNING  11/30/1982     PAP SMEAR  11/30/1985     ZOSTER VACCINES (1 of 2) 11/30/2014     COLORECTAL CANCER " SCREENING  07/14/2020 (Originally 11/30/2014)     INFLUENZA VACCINE RULE BASED (Season Ended) 08/01/2020     TD 18+ HE  03/06/2022     LIPID  08/29/2024     PNEUMOCOCCAL IMMUNIZATION 19-64 MEDIUM RISK  Completed     TDAP ADULT ONE TIME DOSE  Completed         I have discontinued Divina Blue's buPROPion and nicotine. I am also having her maintain her aspirin, SUMAtriptan, and HYDROcodone-acetaminophen.      HPI:  Divina Blue is a 54 y.o. year old who has a telephone visit for follow up.  Son now in residential home and so feeling better - less stress and less anxiety.  Follow up tomorrow with psychiatry.        Need refill.  Pain bad for a while and then got better.  States pain worse with the rain.  Not wanting to quit smoking for good but willing to do it for surgery.  However, since any option of surgery fell off then stopped trying to quit smoking.  Felt like surgery told her no surgery until fall as they had to do cancelled surgeries first.  Stopped taking the wellbutrin - planning on starting when they tell her surgery is going to move forward.  States having lots of HA and when stopped the wellbutrin then the HA stopped.  Migraine once this month but able to take the imitrex and helped.        Current treatment plan:  7.5 mg of norco, 3 times per day (90 pills per day)  Reviewed goals of care.  Benefits continue to outweigh the risks of continued use of opioid analgesia.  Patient renews consent to ongoing therapy.  Prescription monitoring database has been reviewed and shows no aberrant use.  PEG Pain Screening Tool completed and reviewed and patient continues to demonstrate functional improvement as a result of opioid analgesia.  Treatment Agreement reviewed with patient and signed.  Reviewed side effects from current therapy.  Plan to follow-up in 1 months  Number of refills allowed prior to follow-up visit: 0  Prescription was sent electronically to pharmacy listed in treatment  agreement.            Review of Systems- pertinent positive in bold:  Constitutional: Fever, chills, night sweats, fainting, weight change, fatigue, seizures, dizziness, sleeping difficulties, loud snoring/pauses in breathing  Eyes: change in vision, blurred or double vision, redness/eye pain  Ears, nose, mouth, throat: change in hearing, ear pain, hoarseness, difficulty swallowing, sores in the mouth or throat  Respiratory: shortness of breath, cough, bloody sputum, wheezing  Cardiovascular: chest pain, palpitations   Gastrointestinal: abdominal pain, heartburn/indigestion, nausea/vomiting, change in appetite, change in bowel habits, constipation or diarrhea, rectal bleeding/dark stools, difficulty swallowing  Urinary: painful urination, frequent urination, urinary urgency/incontinence, blood in urine/dark urine, nocturia  Musculoskeletal: backache/back pain (new or increasing), weakness, joint pain/stiffness (new or increasing), muscle cramps, swelling of hands, feet, ankles, leg pain/redness  Skin: change in moles/freckles, rash, nodules  Hematologic/lymphatic: swollen lymph glands, abnormal bruising/bleeding  Endocrine: excessive thirst/urination, cold or heat intolerance  Neurologic/emotional: worrisome memory change, numbness/tingling, anxiety, mood swings      Current Scheduled Meds:  Outpatient Encounter Medications as of 5/14/2020   Medication Sig Dispense Refill     aspirin 325 MG tablet Take 325 mg by mouth daily.       HYDROcodone-acetaminophen (NORCO) 7.5-325 mg per tablet Take 1 tablet by mouth every 8 (eight) hours as needed for pain. 90 tablet 0     SUMAtriptan (IMITREX) 50 MG tablet Take 50 mg by mouth every 2 (two) hours as needed for migraine.       [DISCONTINUED] HYDROcodone-acetaminophen (NORCO) 7.5-325 mg per tablet Take 1 tablet by mouth every 8 (eight) hours as needed for pain. 45 tablet 0     [DISCONTINUED] buPROPion (WELLBUTRIN SR) 150 MG 12 hr tablet After using current Wellbutrin 150 XL x  3 days, then switch to Wellbutrin 150 SR two times a day. Stop smoking on day 7. 60 tablet 3     [DISCONTINUED] nicotine (NICOTROL) 10 mg inhaler Inhale 1 puff as needed for smoking cessation. 1 each 0     No facility-administered encounter medications on file as of 2020.      Past Medical History:   Diagnosis Date     Arthritis      Chronic radicular pain of lower back 2019     Depression      DONNY (generalized anxiety disorder) 2019     Insomnia, unspecified type 2019     Severe episode of recurrent major depressive disorder, without psychotic features (H) 2019     SOB (shortness of breath) 2019     Past Surgical History:   Procedure Laterality Date     BACK SURGERY        SECTION      x 4     TONSILLECTOMY       Social History     Tobacco Use     Smoking status: Current Every Day Smoker     Packs/day: 1.00     Years: 33.00     Pack years: 33.00     Types: Cigarettes     Start date: 1980     Smokeless tobacco: Never Used     Tobacco comment: Rolls her own cigarettes- goes through ~2lbs per month   Substance Use Topics     Alcohol use: No     Frequency: Never     Drug use: No       Objective / Physical Examination:  There were no vitals filed for this visit.  Wt Readings from Last 3 Encounters:   20 191 lb 1.6 oz (86.7 kg)   20 183 lb (83 kg)   20 176 lb 4.8 oz (80 kg)     BP Readings from Last 3 Encounters:   20 122/74   20 122/76   20 121/82     There is no height or weight on file to calculate BMI.   Results for orders placed or performed during the hospital encounter of 03/15/20   POCT CREATININE   Result Value Ref Range    iSTAT Creatinine 0.5 (L) 0.6 - 1.1 mg/dL    iSTAT GFR MDRD Af Amer >60 >60 mL/min/1.73m2    iSTAT GFR MDRD Non Af Amer >60 >60 mL/min/1.73m2           General Appearance: Alert and oriented, cooperative, affect appropriate, speech clear, in no apparent distress  Neuro: Alert and oriented, follows commands  appropriately.     No orders of the defined types were placed in this encounter.  Followup: No follow-ups on file. earlier if needed.        Benita Rivera MD      Assessment/Plan:  1. Chronic neck pain  Will refill medication.  Urged patient to call surgery and find out the status of post-covid restart to plan when she would be able to get surgery.  Still recommend C4-C7 anterior cervical disectomy and fusion  - HYDROcodone-acetaminophen (NORCO) 7.5-325 mg per tablet; Take 1 tablet by mouth every 8 (eight) hours as needed for pain.  Dispense: 90 tablet; Refill: 0    2. Chronic, continuous use of opioids  Stable.  Continue on current medication  - HYDROcodone-acetaminophen (NORCO) 7.5-325 mg per tablet; Take 1 tablet by mouth every 8 (eight) hours as needed for pain.  Dispense: 90 tablet; Refill: 0    3. Cigarette nicotine dependence without complication  Discussed smoking cessation at length.  Pt does not wish to quit permanently but feels like she is improving with improved home life    4. Severe episode of recurrent major depressive disorder, without psychotic features (H)  Continue to follow up with behavioral health but seems to be improving overall.      Start time:  9:25am  Stop time:  9:50  Phone call duration:  25 minutes    Benita Rivera MD

## 2021-06-08 NOTE — PATIENT INSTRUCTIONS - HE
Continue medications as prescribed  Have your pharmacy contact us for a refill if you are running low on medications (We may ask you to come into clinic to get a refill from the nurse  No Alcohol or drug use  No driving if sedated  Call the clinic with any questions or concerns   Reach out for help if you feel like hurting yourself or others ([facility] [phone]: [address], [address] or [facility] Suicide Hotline [phone] , call 911 or go to nearest Emergency room    Follow up as directed, for your appointments, per your After Visit Summary Form.

## 2021-06-08 NOTE — PROGRESS NOTES
This video/telephone visit will be conducted via a call between you and your physician/provider. We have found that certain health care needs can be provided without the need for an in-person physical exam. This service lets us provide the care you need with a video /telephone conversation. If a prescription is necessary we can send it directly to your pharmacy. If lab work is needed we can place an order for that and you can then stop by our lab to have the test done at a later time.    Just as we bill insurance for in-person visits, we also bill insurance for video/telephone visits. If you have questions about your insurance coverage, we recommend that you speak with your insurance company.    Patient has given verbal consent for video/Telephone visit? Yes  Patient would like telephone visit please call: [phone]  Rosemarie HANKS CMA  AVS: Mail  Pt is having trouble falling and staying asleep.   PHQ-9=22 Admits to SI Thoughts only denies Plan or intent.   DONNY-7= 21   Mood-current= 11  Patient verified allergies, medications and pharmacy via phone.  Patient states she is ready for visit.    [place] :  5/17/2020 Hydrocodone-Acetaminophen 7.5-325 mg Qty: 90 Provider: Benita Rivera MD

## 2021-06-08 NOTE — PROGRESS NOTES
"Divina Blue is a 54 y.o. female who is being evaluated via a billable telephone visit.      The patient has been notified of following:     \"This telephone visit will be conducted via a call between you and your physician/provider. We have found that certain health care needs can be provided without the need for a physical exam.  This service lets us provide the care you need with a short phone conversation.  If a prescription is necessary we can send it directly to your pharmacy.  If lab work is needed we can place an order for that and you can then stop by our lab to have the test done at a later time.    Telephone visits are billed at different rates depending on your insurance coverage. During this emergency period, for some insurers they may be billed the same as an in-person visit.  Please reach out to your insurance provider with any questions.    If during the course of the call the physician/provider feels a telephone visit is not appropriate, you will not be charged for this service.\"    Patient has given verbal consent to a Telephone visit? Yes    Patient would like to receive their AVS by AVS Preference: Mail a copy.          Date of Service:  2020    Name:  Divina Blue  :  1965  MRN:  [id number]    HPI:   Divina Blue is a 54 y.o. female seen today virtually for intake appointment over the phone as she has no video capacity.  Patient was referred by primary care provider who met with patient on 2/3/2020.  Patient presents with complaints of depression and anxiety.  Patient reports that she is never seen a psychiatric provider in the past and has been seen and prescribed anxiety medications in the past by her primary care doctor.  She is a poor historian and forgets a lot of information including past medications and past providers.  She reports that she in the past she has been on Prozac which was working but unsure for how long and why it was discontinued.  Also stated that she is " "tried other antidepressants in the past and cannot remember which ones.  One thing she was quick to point out is that she tried Vistaril in the past and it did not work until it made her more anxious.  Collateral information from primary care provider indicate that patient has been struggling with depression and anxiety triggered by other social stressors including son who is struggling with substance and behavioral issues.  Patient also struggles with cigarette smoking and vaping and also struggles with chronic pain issues.  Today patient is endorsing depression and anxiety.  She is denying any hypomania symptoms.  Denies delusions hallucinations or paranoia.  Currently denies suicidal homicidal ideations.  She states she is stressed versus feels safe and verbally contracts for safety.  Crisis numbers were provided for her.  We discussed medication management and symptoms.  Since she is currently not on any psychiatric medications and she is tried Prozac in the past I did recommend starting Prozac at 20 mg today and potentially titrating  up in the next 2 weeks.  Medications benefits and side effects profile extensively discussed.  Patient endorsed understanding.  She will return to the clinic in 2 weeks and call in between visits any questions or concerns    Current psychiatric medications:   None  Past psychiatric medication include  Vistaril - did not help with anxiety - prescribed by PCP    Prozac- was working    Other antidepressant - cannot remember     Depression symptoms include- \"isolation , lack of motivation, sadness\" states symptoms have been present for more than 3 years but have become more intense in the past 1 to 2 years.  Anxiety symptoms include : \"unable to describe ,restles, feel like I am having a panic attack\"     Triggers to depression and anxiety : Has a  15 year Son has been put in residential treatment plan CD issues         Psychiatric History:  Current psychiatrist: None   Current " psychotherapist: In weekly family therapy   Current : Yes- Through [facility]   Diagnoses: MDD, Anxiety   Hospitalizations:  No   Suicide attempts: No   Electroconvulsive therapy: Denies   Judicial commitments: Denies   Anxiety General : Endorses   Social anxiety: Denies b  OCD: Denies   Depression: Endorses   Idania/hypomania: Denies   PTSD: Denies   Hallucinations : Denies   Eating disorder:Denies   ADHD: Denies   Personality disorder including history of oppositional defiant disorder or conduct disorder in childhood: Denies                Decision Making Capacity   Patient has the capacity to make independent decisions regarding medical and psychiatric care.    Chemical use History:            Denies any recent or current use of mood altering substances.              Past Medical History:           Past Medical History:   Diagnosis Date     Arthritis      Chronic radicular pain of lower back 2019     Depression      DONNY (generalized anxiety disorder) 2019     Insomnia, unspecified type 2019     Severe episode of recurrent major depressive disorder, without psychotic features (H) 2019     SOB (shortness of breath) 2019       Past Surgical History:   Procedure Laterality Date     BACK SURGERY        SECTION      x 4     TONSILLECTOMY          Family Psychiatric History:        Mental illness: Mother - depression ,schizophrenia   Addiction: 15 year old son , biological mum -polysubstance use   Suicide: Denies       Social History:       Marital Status :Single   Current living circumstances:Lives with 15 year boys   Current sources of financial support:  [facility]    Obstetric History:  Last menstrual period: No LMP recorded. Patient is postmenopausal.     History:  Denied  service.    Access to weapons  Denies access to weapons.           Trauma & Abuse History:  Major accidents and injuries: Denies   Concussion or traumatic brain injury:  Denies   Abuse:  "Denies     Spiritual History:  Sources of hope, meaning, comfort, strength, peace and love:  \" Family\"   Part of an organized Mosque:  Religion     Birth & Development History:  City and state of birth: Born in and  raised in [place] and moved to [place] in 1975  Highest education achieved:    Legal History:  Denies       [place] Prescription Monitoring Program:  No worrisome pharmacy activity.  Not indicated for this patient.    Medications:   These were reviewed.      Current Outpatient Medications on File Prior to Visit   Medication Sig Dispense Refill     HYDROcodone-acetaminophen (NORCO) 7.5-325 mg per tablet Take 1 tablet by mouth every 8 (eight) hours as needed for pain. 90 tablet 0     SUMAtriptan (IMITREX) 50 MG tablet Take 50 mg by mouth every 2 (two) hours as needed for migraine.       aspirin 325 MG tablet Take 325 mg by mouth daily.       No current facility-administered medications on file prior to visit.      Lab Results:   Personally reviewed and discussed with the patient    Lab Results   Component Value Date    WBC 10.1 08/29/2019    HGB 13.1 08/29/2019    HCT 40.2 08/29/2019     08/29/2019    CHOL 203 (H) 08/29/2019    TRIG 203 (H) 08/29/2019    HDL 29 (L) 08/29/2019    ALT 28 08/29/2019    AST 23 08/29/2019     08/29/2019    K 4.5 08/29/2019     08/29/2019    CREATININE 0.89 08/29/2019    BUN 10 08/29/2019    CO2 24 08/29/2019     No results found for: PHENYTOIN, PHENOBARB, VALPROATE, CBMZ    Vital signs:  Ht 5' (1.524 m)   BMI 37.32 kg/m      Allergies:   Penicillins; Tetracycline; and Ibuprofen        Associated Clinical Documents:       Notes reviewed in EPIC and [facility] including: medication reconciliation, progress notes, recent labs, PMH, and OSH records.    ROS:       10 point ROS was negative except for the items listed in HPI.  No Medication s/e's        MSE:      Alert & oriented x 3.   Speech: Normal rate, rhythm and tone.  Gait: Not assessed  Musculoskeletal: Not " assessed  Mood/Affect: Pleasant and cooperative with interview.  Thought Process: Normal rate, logical.  Thought Content: Denies suicidal or homicide ideation.  Associations: Intact, no delusions.  Perceptions: No hallucinations.  Memory: Limited -Could not remember a lot of details including past medications and previous encounters with providers.,  Attention span and concentration:easily distracted   Language: Intact.  Fund of Knowledge: Fair  Insight and Judgement: Fair    Clinical Outcome Measures:  1. PHQ-9: Total score : 21  2. MDQ: Total score: 11  3. DONNY-7: Total score : 25    Impression:           MDD  Anxiety     Plan:         Patient and I reviewed diagnosis and treatment plan.   Reviewed risks/benefits of medication with patient.  Ongoing education given regarding diagnostic and treatment options with adequate verbalization of understanding  Patient agrees with following recommendations:    1. Start Prozac 20 mg daily   2. RTC : 2 weeks call in between visit with nay questions or concerns   3. Highly recommend psychotherapy - Pt not interested at this time but may consider in the future       Total Time:      60 Minutes spent on this visit with >50% time spent on  dscussing and educating patient about diagnosis, treatment options, risks, benefits ,side effects of medications and instructions for follow up.  Time also spent on reviewing  Past  EHR from the providers  For better transition of care    This dictation was completed with speech recognition software and there may be unintended word substitutions.        Phone call duration:60 minutes    Merry Anglin NP

## 2021-06-08 NOTE — TELEPHONE ENCOUNTER
Controlled Substance Refill Request  Medication Name:   Requested Prescriptions     Pending Prescriptions Disp Refills     HYDROcodone-acetaminophen (NORCO) 7.5-325 mg per tablet [Pharmacy Med Name: HYDROCODONE-ACETAMINOP 7.5-325 TABS] 90 tablet 0     Sig: TAKE ONE TABLET BY MOUTH EVERY 8 HOURS AS NEEDED FOR PAIN     Date Last Fill: 5/14/20  Requested Pharmacy: [facility]  Submit electronically to pharmacy  Controlled Substance Agreement on file:   Encounter-Level CSA Scan Date - 10/31/2018:    Scan on 11/2/2018 11:53 AM        Last office visit:  5/14/20

## 2021-06-08 NOTE — PROGRESS NOTES
________________________________________  Medications Phoned  to Pharmacy [] yes [x]no  Name of Pharmacist:  List Medications, including dose, quantity and instructions    Medications ordered this visit were e-scribed.  Verified by order class [x] yes  [] no  Prozac 20 mg   Medication changes or discontinuations were communicated to patient's pharmacy: [x] yes  [] no  Prozac 10 mg (spoke to Don)  Dictation completed at time of chart check: [] yes  [x] no    I have checked the documentation for today s encounters and the above information has been reviewed and completed.

## 2021-06-09 NOTE — TELEPHONE ENCOUNTER
Controlled Substance Refill Request  Medication Name:   Requested Prescriptions     Pending Prescriptions Disp Refills     HYDROcodone-acetaminophen (NORCO) 7.5-325 mg per tablet [Pharmacy Med Name: HYDROCODONE-ACETAMINOP 7.5-325 TABS] 90 tablet 0     Sig: TAKE ONE TABLET BY MOUTH EVERY 8 HOURS AS NEEDED FOR PAIN     Date Last Fill: 6/17/20  Requested Pharmacy: [facility]  Submit electronically to pharmacy  Controlled Substance Agreement on file:   Encounter-Level CSA Scan Date - 10/31/2018:    Scan on 11/2/2018 11:53 AM        Last office visit:  5/14/20

## 2021-06-09 NOTE — PATIENT INSTRUCTIONS - HE
Continue medications as prescribed  Have your pharmacy contact us for a refill if you are running low on medications (We may ask you to come into clinic to get a refill from the nurse  No Alcohol or drug use  No driving if sedated  Call the clinic with any questions or concerns   Reach out for help if you feel like hurting yourself or others ([facility] [phone]: [address], [address] or [facility] Suicide Hotline [phone] , call 911 or go to nearest Emergency room    Follow up as directed, for your appointments, per your After Visit Summary Form.

## 2021-06-09 NOTE — PROGRESS NOTES
________________________________________  Medications Phoned  to Pharmacy [] yes []no  Name of Pharmacist:  List Medications, including dose, quantity and instructions    Medications ordered this visit were e-scribed.  Verified by order class [x] yes  [] no  Prozac 20 and 40 mg /day  Medication changes or discontinuations were communicated to patient's pharmacy: [x] yes  [] no  Clarified with provider then called pharmacy and cancelled the Rx for Prozac 40 mg/day due to dose decrease.   Dictation completed at time of chart check: [x] yes  [] no    I have checked the documentation for today s encounters and the above information has been reviewed and completed.

## 2021-06-09 NOTE — PROGRESS NOTES
"Divina Blue is a 54 y.o. female who is being evaluated via a billable telephone visit.      The patient has been notified of following:     \"This telephone visit will be conducted via a call between you and your physician/provider. We have found that certain health care needs can be provided without the need for a physical exam.  This service lets us provide the care you need with a short phone conversation.  If a prescription is necessary we can send it directly to your pharmacy.  If lab work is needed we can place an order for that and you can then stop by our lab to have the test done at a later time.    Telephone visits are billed at different rates depending on your insurance coverage. During this emergency period, for some insurers they may be billed the same as an in-person visit.  Please reach out to your insurance provider with any questions.    If during the course of the call the physician/provider feels a telephone visit is not appropriate, you will not be charged for this service.\"    Patient has given verbal consent to a Telephone visit? Yes      Patient would like to receive their AVS by AVS Preference: Mail a copy.  Psychiatric  Out patient Follow Up Progress Note  Date of visit:7/15/2020         Discussion of Care and Treatment Recommendations:   This is a 54 y.o. female with  a history of depression and anxiety who presents to the clinic today virtually for follow-up appointment.         Last visit 06/17/2020  Recommendation at last visit .  1. Titrate  Prozac to  40 mg daily   2. RTC : 4 weeks call in between visit with anurag questions or concerns      Patient and I reviewed diagnosis and treatment plan and patient agrees with following recommendations:  Ongoing education given regarding diagnostic and treatment options with adequate verbalization of understanding.  Plan   1. Titrate down Prozac to  20 mg daily -Pt has C/O hot flushes on 40 mg   2. RTC : 4 weeks call in between visit with anurag " "questions or concerns             DIagnoses:         Patient Active Problem List   Diagnosis     Chronic radicular pain of lower back     Chronic neck pain     Severe episode of recurrent major depressive disorder, without psychotic features (H)     DONNY (generalized anxiety disorder)     Insomnia, unspecified type     Cigarette nicotine dependence without complication     SOB (shortness of breath)             Chief Complaint / Subjective:    Chief complaint:  \" I have been getting hot flushed with an increased dose of Prozac      History of Present Illness:   Per patient's statement : Reports that she has been feeling extremely hot and getting hot flashes more since we increased the dose of Prozac to 40 mg.  Unsure whether this has also had any effect on her depression and anxiety as she has not noticed any significant change.  She has postmenopausal hot flashes but states this is significantly worse.  She would like to go back to Prozac 20 mg.  She denies all other psychiatric issues and offers no other concerns.  She is still working with neurology  to find her potential community services but things are moving slowly due to COVID-19  Mental Status Examination:   General: Alert and oriented x 3   Speech: Normal in rate and tone  Language: Intact  Thought process: Coherent  Thought content:                           Auditory hallucinations-Denies                          Visual Hallucinations - Denies                         Delusions Absent                           Loose Associations:  No                          Suicidal thoughts: Denies                          Homicidal thoughts:Denies                        Affect: Neutral  Mood: Neutral   Intellectual functioning: Within normal limits  Memory: Within normal limits  Fund of knowledge: Average  Attention and concentration: Within normal limits  Gait: Unable to assess telephone visit  Psychomotor activity: Unable to assess telephone visit  Muscles: " Unable to assess telephone visit  InSight and judgment: Fair      Drug/treatment history and current pattern of use:   Denies    Medication changes: See Above   Medication adherence: compliant  Medication side effects: absent  Information about medications: Side effects, benefits and alternative treatments discussed and patient agrees .    Psychotherapy: Supportive therapy day-to-day living    Education: Diet, exercise, abstinence from drugs and alcohol, patient will not drive if sedated and medications or  under influence of any substance    Lab Results:  Personally reviewed and discussed with the patient    Lab Results   Component Value Date    WBC 10.1 08/29/2019    HGB 13.1 08/29/2019    HCT 40.2 08/29/2019     08/29/2019    CHOL 203 (H) 08/29/2019    TRIG 203 (H) 08/29/2019    HDL 29 (L) 08/29/2019    ALT 28 08/29/2019    AST 23 08/29/2019     08/29/2019    K 4.5 08/29/2019     08/29/2019    CREATININE 0.89 08/29/2019    BUN 10 08/29/2019    CO2 24 08/29/2019       Vital signs:  There were no vitals taken for this visit.  Unable to assess telephone visit  Allergies: Penicillins; Tetracycline; and Ibuprofen           Review of Systems:      ROS:       10 point ROS was negative except for the items listed in HPI- Subjective data only              Medications:     Current Outpatient Medications on File Prior to Visit   Medication Sig Dispense Refill     HYDROcodone-acetaminophen (NORCO) 7.5-325 mg per tablet TAKE ONE TABLET BY MOUTH EVERY 8 HOURS AS NEEDED FOR PAIN 90 tablet 0     [DISCONTINUED] FLUoxetine (PROZAC) 40 MG capsule Take 1 capsule (40 mg total) by mouth daily. 30 capsule 0     SUMAtriptan (IMITREX) 50 MG tablet Take 50 mg by mouth every 2 (two) hours as needed for migraine.       No current facility-administered medications on file prior to visit.          Coordination of Care:   More than 25 minutes spent on this visit  with more than 50% of time spent on coordination of care  including: Educating patient about diagnosis, prognosis, side effects and benefits of medications, diet, exercise.  Time also spent providing supportive therapy regarding above issues.    This note was created using a dictation system. All typing errors or contextual distortion is unintentional and software inherent.    Phone call duration: 25 minutes    Merry Anglin NP

## 2021-06-10 NOTE — TELEPHONE ENCOUNTER
Controlled Substance Refill Request  Medication Name:   Requested Prescriptions     Pending Prescriptions Disp Refills     HYDROcodone-acetaminophen (NORCO) 7.5-325 mg per tablet 90 tablet 0     Sig: Take 1 tablet by mouth every 8 (eight) hours as needed for pain.     Date Last Fill: 7/17/2020  Requested Pharmacy: [facility]  Submit electronically to pharmacy  Controlled Substance Agreement on file:   Encounter-Level CSA Scan Date - 10/31/2018:    Scan on 11/2/2018 11:53 AM        Last office visit:  5/14/2020

## 2021-06-10 NOTE — TELEPHONE ENCOUNTER
Last Office Visit  5/14/2020 Benita Rivera MD    Notes:  1. Chronic neck pain  Will refill medication.  Urged patient to call surgery and find out the status of post-covid restart to plan when she would be able to get surgery.  Still recommend C4-C7 anterior cervical disectomy and fusion  - HYDROcodone-acetaminophen (NORCO) 7.5-325 mg per tablet; Take 1 tablet by mouth every 8 (eight) hours as needed for pain.  Dispense: 90 tablet; Refill: 0     2. Chronic, continuous use of opioids  Stable.  Continue on current medication  - HYDROcodone-acetaminophen (NORCO) 7.5-325 mg per tablet; Take 1 tablet by mouth every 8 (eight) hours as needed for pain.  Dispense: 90 tablet; Refill: 0     3. Cigarette nicotine dependence without complication  Discussed smoking cessation at length.  Pt does not wish to quit permanently but feels like she is improving with improved home life     4. Severe episode of recurrent major depressive disorder, without psychotic features (H)  Continue to follow up with behavioral health but seems to be improving overall.       Last Filled:  HYDROcodone-acetaminophen (NORCO) 7.5-325 mg per tablet  90 tablet  0  7/17/2020   No    Sig: TAKE ONE TABLET BY MOUTH EVERY 8 HOURS AS NEEDED FOR PAIN    Sent to pharmacy as: HYDROcodone 7.5 mg-acetaminophen 325 mg tablet (NORCO)    Earliest Fill Date: 7/17/2020    E-Prescribing Status: Receipt confirmed by pharmacy (7/17/2020 11:43 AM CDT)          Lab Results   Component Value Date    AMPHET Screen Negative 02/04/2020    HEBENZODIAZ Screen Negative 02/04/2020    OPIATES (!) 02/04/2020     Screen Positive (Confirmation available on request)    PCP Screen Negative 02/04/2020    THC Screen Negative 02/04/2020    BARBIT Screen Negative 02/04/2020    COCAINEMETAB Screen Negative 02/04/2020    METHADNE Screen Negative 02/04/2020    OXYCODONE (!) 02/04/2020     Screen Positive (Confirmation available on request)    CREAUR 86.1 02/04/2020         Next OV:  9/3/2020  Benita Rivera MD      Encounter-Level CSA Scan Date - 10/31/2018:    Scan on 11/2/2018 11:53 AM  Scan 11/06/2019        reviewed 7/17/2020      Medication teed up for provider signature - please adjust as appropriate.

## 2021-06-11 NOTE — TELEPHONE ENCOUNTER
Last Office Visit  09/03/2020 Benita Rivera MD    Notes:    HPI:  Divina Blue is a 54 y.o. year old who has a telephone visit for follow up.  Got a letter from the insurance company  Saying that the doctor is no longer covered by insurance.  Mood doing well.  Lot of stress and anxiety with son still - he is still at the treatment place but issues with the site.  Working with psychiatry and tried to up the prozac to 40mg but unable to tolerate it - too much sweating.  So bringing it back down.  Feels like her pain is doing well.      Last Filled:      HYDROcodone-acetaminophen (NORCO) 7.5-325 mg per tablet  90 tablet  0  8/19/2020   No    Sig - Route: Take 1 tablet by mouth every 8 (eight) hours as needed for pain. - Oral          Lab Results   Component Value Date    AMPHET Screen Negative 02/04/2020    HEBENZODIAZ Screen Negative 02/04/2020    OPIATES (!) 02/04/2020     Screen Positive (Confirmation available on request)    PCP Screen Negative 02/04/2020    THC Screen Negative 02/04/2020    BARBIT Screen Negative 02/04/2020    COCAINEMETAB Screen Negative 02/04/2020    METHADNE Screen Negative 02/04/2020    OXYCODONE (!) 02/04/2020     Screen Positive (Confirmation available on request)    RITAR 86.1 02/04/2020         Next OV:  Visit date not found      Encounter-Level CSA Scan Date - 10/31/2018:    Scan on 11/2/2018 11:53 AM          reviewed and results are as follows:    Last reviewed 07/17/2020      Medication teed up for provider signature - please adjust as appropriate.

## 2021-06-11 NOTE — PROGRESS NOTES
"Divina Blue is a 54 y.o. female who is being evaluated via a billable telephone visit.      The patient has been notified of following:     \"This telephone visit will be conducted via a call between you and your physician/provider. We have found that certain health care needs can be provided without the need for a physical exam.  This service lets us provide the care you need with a short phone conversation.  If a prescription is necessary we can send it directly to your pharmacy.  If lab work is needed we can place an order for that and you can then stop by our lab to have the test done at a later time.    Telephone visits are billed at different rates depending on your insurance coverage. During this emergency period, for some insurers they may be billed the same as an in-person visit.  Please reach out to your insurance provider with any questions.    If during the course of the call the physician/provider feels a telephone visit is not appropriate, you will not be charged for this service.\"    Patient has given verbal consent to a Telephone visit? Yes    What phone number would you like to be contacted at? [phone]    Patient would like to receive their AVS by AVS Preference: [place].    Family Medicine Office Visit  [facility]  Patient Name: Divina Blue  Patient Age: 54 y.o.  YOB: 1965  MRN: [id number]    Date of Visit: 9/3/2020  Reason for Office Visit:   Chief Complaint   Patient presents with     New Psychiatrist needed     received a letter from insurance that current psychiatrist provider is no longer covered under her plan- looking for other options/referral            Assessment / Plan / Medical Decision Making:      Health Maintenance Review  Health Maintenance   Topic Date Due     DEPRESSION ACTION PLAN  1965     HEPATITIS C SCREENING  1965     PREVENTIVE CARE VISIT  1965     HIV SCREENING  11/30/1979     ADVANCE CARE " PLANNING  11/30/1982     PAP SMEAR  11/30/1985     ZOSTER VACCINES (1 of 2) 11/30/2014     INFLUENZA VACCINE RULE BASED (1) 08/01/2020     MAMMOGRAM  11/03/2020 (Originally 1965)     COLORECTAL CANCER SCREENING  11/03/2020 (Originally 1965)     TD 18+ HE  03/06/2022     LIPID  08/29/2024     PNEUMOCOCCAL IMMUNIZATION 19-64 MEDIUM RISK  Completed     TDAP ADULT ONE TIME DOSE  Completed     HEPATITIS B VACCINES  Aged Out         I am having Divina Blue maintain her SUMAtriptan, FLUoxetine, FLUoxetine, and HYDROcodone-acetaminophen.      HPI:  Divina Blue is a 54 y.o. year old who has a telephone visit for follow up.  Got a letter from the insurance company  Saying that the doctor is no longer covered by insurance.  Mood doing well.  Lot of stress and anxiety with son still - he is still at the treatment place but issues with the site.  Working with psychiatry and tried to up the prozac to 40mg but unable to tolerate it - too much sweating.  So bringing it back down.  Feels like her pain is doing well.          Review of Systems- pertinent positive in bold:  Constitutional: Fever, chills, night sweats, fainting, weight change, fatigue, seizures, dizziness, sleeping difficulties, loud snoring/pauses in breathing  Eyes: change in vision, blurred or double vision, redness/eye pain  Ears, nose, mouth, throat: change in hearing, ear pain, hoarseness, difficulty swallowing, sores in the mouth or throat  Respiratory: shortness of breath, cough, bloody sputum, wheezing  Cardiovascular: chest pain, palpitations   Gastrointestinal: abdominal pain, heartburn/indigestion, nausea/vomiting, change in appetite, change in bowel habits, constipation or diarrhea, rectal bleeding/dark stools, difficulty swallowing  Urinary: painful urination, frequent urination, urinary urgency/incontinence, blood in urine/dark urine, nocturia  Musculoskeletal: backache/back pain (new or increasing), weakness, joint pain/stiffness (new  or increasing), muscle cramps, swelling of hands, feet, ankles, leg pain/redness  Skin: change in moles/freckles, rash, nodules  Hematologic/lymphatic: swollen lymph glands, abnormal bruising/bleeding  Endocrine: excessive thirst/urination, cold or heat intolerance  Neurologic/emotional: worrisome memory change, numbness/tingling, anxiety, mood swings      Current Scheduled Meds:  Outpatient Encounter Medications as of 9/3/2020   Medication Sig Dispense Refill     FLUoxetine (PROZAC) 20 MG capsule Take 1 capsule (20 mg total) by mouth daily. 90 capsule 0     HYDROcodone-acetaminophen (NORCO) 7.5-325 mg per tablet Take 1 tablet by mouth every 8 (eight) hours as needed for pain. 90 tablet 0     FLUoxetine (PROZAC) 20 MG capsule Take 2 capsules (40 mg total) by mouth daily. 90 capsule 0     SUMAtriptan (IMITREX) 50 MG tablet Take 50 mg by mouth every 2 (two) hours as needed for migraine.       No facility-administered encounter medications on file as of 9/3/2020.      Past Medical History:   Diagnosis Date     Arthritis      Chronic radicular pain of lower back 2019     Depression      DONNY (generalized anxiety disorder) 2019     Insomnia, unspecified type 2019     Severe episode of recurrent major depressive disorder, without psychotic features (H) 2019     SOB (shortness of breath) 2019     Past Surgical History:   Procedure Laterality Date     BACK SURGERY        SECTION      x 4     TONSILLECTOMY       Social History     Tobacco Use     Smoking status: Current Every Day Smoker     Packs/day: 1.00     Years: 33.00     Pack years: 33.00     Types: Cigarettes     Start date: 1980     Smokeless tobacco: Never Used     Tobacco comment: Rolls her own cigarettes- goes through ~2lbs per month   Substance Use Topics     Alcohol use: No     Frequency: Never     Drug use: No       Objective / Physical Examination:  There were no vitals filed for this visit.  Wt Readings from Last 3  Encounters:   03/02/20 191 lb 1.6 oz (86.7 kg)   02/03/20 183 lb (83 kg)   01/28/20 176 lb 4.8 oz (80 kg)     BP Readings from Last 3 Encounters:   03/02/20 122/74   02/03/20 122/76   01/28/20 121/82     There is no height or weight on file to calculate BMI.   Results for orders placed or performed during the hospital encounter of 03/15/20   POCT CREATININE   Result Value Ref Range    iSTAT Creatinine 0.5 (L) 0.6 - 1.1 mg/dL    iSTAT GFR MDRD Af Amer >60 >60 mL/min/1.73m2    iSTAT GFR MDRD Non Af Amer >60 >60 mL/min/1.73m2           General Appearance: Alert and oriented, cooperative, affect appropriate, speech clear, in no apparent distress  Neuro: Alert and oriented, follows commands appropriately.     No orders of the defined types were placed in this encounter.  Followup: No follow-ups on file. earlier if needed.        Benita Rivera MD      Assessment/Plan:  1. Severe episode of recurrent major depressive disorder, without psychotic features (H)  Reached out to mental health to see if provider still in network and if needed will do a new referral for psychiatry.    2. DONNY (generalized anxiety disorder)  Continue with current medications.        Phone call duration:  10 minutes    Benita Rivera MD

## 2021-06-11 NOTE — TELEPHONE ENCOUNTER
Controlled Substance Refill Request  Medication Name:   Requested Prescriptions     Pending Prescriptions Disp Refills     HYDROcodone-acetaminophen (NORCO) 7.5-325 mg per tablet [Pharmacy Med Name: HYDROCODONE-ACETAMINOP 7.5-325 TABS] 90 tablet 0     Sig: TAKE ONE TABLET BY MOUTH EVERY 8 HOURS AS NEEDED FOR PAIN     Date Last Fill: 8/19/20  [facility]  Submit electronically to pharmacy  Controlled Substance Agreement on file:   Encounter-Level CSA Scan Date - 10/31/2018:    Scan on 11/2/2018 11:53 AM        Last office visit:  5/14/20

## 2021-06-12 NOTE — TELEPHONE ENCOUNTER
Most recent controlled substance agreement (which is actually ) notes that patient will be able to obtain only one additional refill without being seen in clinic.  Patient has not followed up on her chronic pain issues with an in-person visit since March.  She also is noted to have failed her last drug screen on 20, when she had oxycodone in her urine but was not expected to have any opiates present.  At that time she was referred to the pain clinic. Pain clinic was not willing to see her. Appears that she has continued to receive opioid medication from PCP since that time.   Please advise that she should be seen in clinic to clarify pain plan moving forward and to update CSA if needed and to likely perform urine screen. I'm not comfortable refilling this medication until she is seen in clinic.   Bg Sanchez MD  (for Miguel)

## 2021-06-12 NOTE — TELEPHONE ENCOUNTER
Medication Request  Medication name:    Disp Refills Start End    HYDROcodone-acetaminophen (NORCO) 7.5-325 mg per tablet 90 tablet 0 9/23/2020     Sig: TAKE ONE TABLET BY MOUTH EVERY 8 HOURS AS NEEDED FOR PAIN    Sent to pharmacy as: HYDROcodone 7.5 mg-acetaminophen 325 mg tablet (NORCO)    Earliest Fill Date: 9/23/2020    E-Prescribing Status: Receipt confirmed by pharmacy (9/23/2020  1:09 PM CDT)      Requested Pharmacy: [facility]  Reason for request: Patient stated since the weather changed her pain has increased. Patient stated no new injuries.  When did you use medication last?:  Today   Patient offered appointment:  n/a  Okay to leave a detailed message: yes  [phone]

## 2021-06-12 NOTE — TELEPHONE ENCOUNTER
Last fill 09/23/2020    Last seen 09/03/2020 for a VV    No upcoming appointments    No follow up indicated in note from last visit

## 2021-06-12 NOTE — PROGRESS NOTES
"Divina Blue is a 54 y.o. female who is being evaluated via a billable telephone visit.      The patient has been notified of following:     \"This telephone visit will be conducted via a call between you and your physician/provider. We have found that certain health care needs can be provided without the need for a physical exam.  This service lets us provide the care you need with a short phone conversation.  If a prescription is necessary we can send it directly to your pharmacy.  If lab work is needed we can place an order for that and you can then stop by our lab to have the test done at a later time.    Telephone visits are billed at different rates depending on your insurance coverage. During this emergency period, for some insurers they may be billed the same as an in-person visit.  Please reach out to your insurance provider with any questions.    If during the course of the call the physician/provider feels a telephone visit is not appropriate, you will not be charged for this service.\"    Patient has given verbal consent to a Telephone visit? Yes    What phone number would you like to be contacted at? [phone]    Patient would like to receive their AVS by Mail copy home    Family [facility]  Patient Name: Divina Blue  Patient Age: 54 y.o.  YOB: 1965  MRN: [id number]    Date of Visit: 10/28/2020  Reason for Office Visit:   Chief Complaint   Patient presents with     Follow-up     medication check           Assessment / Plan / Medical Decision Making:      Health Maintenance Review  Health Maintenance   Topic Date Due     DEPRESSION ACTION PLAN  1965     HEPATITIS C SCREENING  1965     PREVENTIVE CARE VISIT  1965     HIV SCREENING  11/30/1979     ADVANCE CARE PLANNING  11/30/1982     PAP SMEAR  11/30/1985     ZOSTER VACCINES (1 of 2) 11/30/2014     INFLUENZA VACCINE RULE BASED (1) 08/01/2020     MAMMOGRAM  " 11/03/2020 (Originally 1965)     COLORECTAL CANCER SCREENING  11/03/2020 (Originally 1965)     TD 18+ HE  03/06/2022     LIPID  08/29/2024     Pneumococcal Vaccine: Pediatrics (0 to 5 Years) and At-Risk Patients (6 to 64 Years)  Completed     TDAP ADULT ONE TIME DOSE  Completed     HEPATITIS B VACCINES  Aged Out         I have changed Divina Blue's HYDROcodone-acetaminophen. I am also having her start on Chantix Starting Month Box and varenicline. Additionally, I am having her maintain her FLUoxetine.      HPI:  Divina Blue is a 54 y.o. year old who has a telephone visit for medication check.  Not nicotine free.  Not followed up with neurosurgery.  I received communication from Dr Ames's office in March 2020 stating that the patient would need to be nicotine free in order to proceed with surgery.  When asked at that visit what the patient would like to receive going forward, she mentioned pain medication.  At that time a referral for pain management was placed, but pt never followed up or was never contacted by them.  Pt admits to still smoking and started up as she thought that neurosurgery was not doing surgeries due to the pandemic.  Pt is complaining of worsening pain and is asking for higher doses of medication.  I have discussed with her the need to stop medication if possible as that is not the solution for her pain.  She agreed with that sentiment.    Working with [facility] .  Seeing behavioral health.  Unsure if she is compliant with taking medications.  She reports that she received a letter from insurance saying that Merry Anglin from behavioral health would not be covered by her plan, but when I contacted that office they did not know of this.      Review of Systems- pertinent positive in bold:  Constitutional: Fever, chills, night sweats, fainting, weight change, fatigue, seizures, dizziness, sleeping difficulties, loud snoring/pauses in breathing  Eyes: change in vision,  blurred or double vision, redness/eye pain  Ears, nose, mouth, throat: change in hearing, ear pain, hoarseness, difficulty swallowing, sores in the mouth or throat  Respiratory: shortness of breath, cough, bloody sputum, wheezing  Cardiovascular: chest pain, palpitations   Gastrointestinal: abdominal pain, heartburn/indigestion, nausea/vomiting, change in appetite, change in bowel habits, constipation or diarrhea, rectal bleeding/dark stools, difficulty swallowing  Urinary: painful urination, frequent urination, urinary urgency/incontinence, blood in urine/dark urine, nocturia  Musculoskeletal: backache/back pain (new or increasing), weakness, joint pain/stiffness (new or increasing), muscle cramps, swelling of hands, feet, ankles, leg pain/redness  Skin: change in moles/freckles, rash, nodules  Hematologic/lymphatic: swollen lymph glands, abnormal bruising/bleeding  Endocrine: excessive thirst/urination, cold or heat intolerance  Neurologic/emotional: worrisome memory change, numbness/tingling, anxiety, mood swings      Current Scheduled Meds:  Outpatient Encounter Medications as of 10/28/2020   Medication Sig Dispense Refill     HYDROcodone-acetaminophen (NORCO) 7.5-325 mg per tablet Take 1 tablet by mouth every 8 (eight) hours as needed for pain. 90 tablet 0     [DISCONTINUED] HYDROcodone-acetaminophen (NORCO) 7.5-325 mg per tablet TAKE ONE TABLET BY MOUTH EVERY 8 HOURS AS NEEDED FOR PAIN 90 tablet 0     FLUoxetine (PROZAC) 20 MG capsule Take 1 capsule (20 mg total) by mouth daily. 90 capsule 0     varenicline (CHANTIX STARTING MONTH BOX) 0.5 mg (11)- 1 mg (42) tablet 1 wk before you stop smoking take 0.5mg daily on days 1-3, 0.5mg 2 times each day on days 4-7, then 1mg 2 times daily. 53 tablet 0     varenicline (CHANTIX) 1 mg tablet Take 1 tab by mouth two times a day. Take after eating with a full glass of water. NOTE: Dispense as maintenance for refills only. 60 tablet 2     No facility-administered encounter  medications on file as of 10/28/2020.      Past Medical History:   Diagnosis Date     Arthritis      Chronic radicular pain of lower back 2019     Depression      DONNY (generalized anxiety disorder) 2019     Insomnia, unspecified type 2019     Severe episode of recurrent major depressive disorder, without psychotic features (H) 2019     SOB (shortness of breath) 2019     Past Surgical History:   Procedure Laterality Date     BACK SURGERY        SECTION      x 4     TONSILLECTOMY       Social History     Tobacco Use     Smoking status: Current Every Day Smoker     Packs/day: 1.00     Years: 33.00     Pack years: 33.00     Types: Cigarettes     Start date: 1980     Smokeless tobacco: Never Used     Tobacco comment: Rolls her own cigarettes- goes through ~2lbs per month   Substance Use Topics     Alcohol use: No     Frequency: Never     Drug use: No       Objective / Physical Examination:  There were no vitals filed for this visit.  Wt Readings from Last 3 Encounters:   20 191 lb 1.6 oz (86.7 kg)   20 183 lb (83 kg)   20 176 lb 4.8 oz (80 kg)     BP Readings from Last 3 Encounters:   20 122/74   20 122/76   20 121/82     There is no height or weight on file to calculate BMI.   Results for orders placed or performed during the hospital encounter of 03/15/20   POCT CREATININE   Result Value Ref Range    iSTAT Creatinine 0.5 (L) 0.6 - 1.1 mg/dL    iSTAT GFR MDRD Af Amer >60 >60 mL/min/1.73m2    iSTAT GFR MDRD Non Af Amer >60 >60 mL/min/1.73m2           General Appearance: Alert and oriented, cooperative, affect appropriate, speech clear, in no apparent distress  Neuro: Alert and oriented, follows commands appropriately.     Orders Placed This Encounter   Procedures     Ambulatory referral to Pain Clinic     Ambulatory referral to Psychiatry   Followup: Return in about 4 weeks (around 2020) for Recheck. earlier if needed.        Benita Rivera,  MD      Assessment/Plan:  1. Cigarette nicotine dependence without complication  Discussed calling neurosurgery to make a new appointment and pt was going to do so.  Recommend immediately stopping smoking and discussed chantix including possible JIMMIE but patient would like to take the medication.    - varenicline (CHANTIX STARTING MONTH BOX) 0.5 mg (11)- 1 mg (42) tablet; 1 wk before you stop smoking take 0.5mg daily on days 1-3, 0.5mg 2 times each day on days 4-7, then 1mg 2 times daily.  Dispense: 53 tablet; Refill: 0  - varenicline (CHANTIX) 1 mg tablet; Take 1 tab by mouth two times a day. Take after eating with a full glass of water. NOTE: Dispense as maintenance for refills only.  Dispense: 60 tablet; Refill: 2    2. Chronic pain syndrome  Worsening and recommend referral to pain clinic to see if there is an alternative to her current medication that would provide her with more relief until surgery.    - Ambulatory referral to Pain Clinic    3. Chronic neck pain  Discussed long term use of opioids and recommend referral to pain management to determine if alternative options  - HYDROcodone-acetaminophen (NORCO) 7.5-325 mg per tablet; Take 1 tablet by mouth every 8 (eight) hours as needed for pain.  Dispense: 90 tablet; Refill: 0    4. Chronic, continuous use of opioids  - HYDROcodone-acetaminophen (NORCO) 7.5-325 mg per tablet; Take 1 tablet by mouth every 8 (eight) hours as needed for pain.  Dispense: 90 tablet; Refill: 0    5. Severe episode of recurrent major depressive disorder, without psychotic features (H)  Will do another referral to psychiatry to ensure that patient is seeing therapy and getting medications due to the stressors associated with living with son   - Ambulatory referral to Psychiatry    6. DONNY (generalized anxiety disorder)  Stable.  Continue with current medications  - Ambulatory referral to Psychiatry        Phone call duration:  20 minutes    Benita Rivera MD

## 2021-06-12 NOTE — TELEPHONE ENCOUNTER
Controlled Substance Refill Request  Medication Name:   Requested Prescriptions     Pending Prescriptions Disp Refills     HYDROcodone-acetaminophen (NORCO) 7.5-325 mg per tablet [Pharmacy Med Name: HYDROCODONE-ACETAMINOP 7.5-325 TABS] 90 tablet 0     Sig: TAKE ONE TABLET BY MOUTH EVERY 8 HOURS AS NEEDED FOR PAIN     Date Last Fill: 9/23/20  [facility]ed [facility]acy: [facility]  Submit electronically to pharmacy  Controlled Substance Agreement on file:   Encounter-Level CSA Scan Date - 10/31/2018:    Scan on 11/2/2018 11:53 AM        Last office visit:  5/14/20

## 2021-06-12 NOTE — TELEPHONE ENCOUNTER
Last fill 09/23/2020    Last seen 09/3/2020 for VV    No upcoming appointments    No follow up indicated in note

## 2021-06-13 NOTE — TELEPHONE ENCOUNTER
Last fill 10/28/2020    Last seen 12/10/2020    No upcoming appointments     Return in about 4 weeks (around 1/7/2021) for Recheck

## 2021-06-13 NOTE — PROGRESS NOTES
"Divina Blue is a 55 y.o. female who is being evaluated via a billable telephone visit.      The patient has been notified of following:     \"This telephone visit will be conducted via a call between you and your physician/provider. We have found that certain health care needs can be provided without the need for a physical exam.  This service lets us provide the care you need with a short phone conversation.  If a prescription is necessary we can send it directly to your pharmacy.  If lab work is needed we can place an order for that and you can then stop by our lab to have the test done at a later time.    Telephone visits are billed at different rates depending on your insurance coverage. During this emergency period, for some insurers they may be billed the same as an in-person visit.  Please reach out to your insurance provider with any questions.    If during the course of the call the physician/provider feels a telephone visit is not appropriate, you will not be charged for this service.\"    Patient has given verbal consent to a Telephone visit? Yes    What phone number would you like to be contacted at? [phone]    Patient would like to receive their AVS by AVS Preference: [place].    Family Medicine Office Visit  [facility]  Patient Name: Divina Blue  Patient Age: 55 y.o.  YOB: 1965  MRN: [id number]    Date of Visit: 12/10/2020  Reason for Office Visit:   Chief Complaint   Patient presents with     Follow-up     medication check           Assessment / Plan / Medical Decision Making:      Health Maintenance Review  Health Maintenance   Topic Date Due     DEPRESSION ACTION PLAN  1965     HEPATITIS C SCREENING  1965     PREVENTIVE CARE VISIT  1965     MAMMOGRAM  1965     HIV SCREENING  11/30/1979     ADVANCE CARE PLANNING  11/30/1982     COLORECTAL CANCER SCREENING  11/30/1982     PAP SMEAR  11/30/1985     ZOSTER VACCINES (1 " of 2) 11/30/2014     INFLUENZA VACCINE RULE BASED (1) 08/01/2020     TD 18+ HE  03/06/2022     LIPID  08/29/2024     Pneumococcal Vaccine: Pediatrics (0 to 5 Years) and At-Risk Patients (6 to 64 Years)  Completed     TDAP ADULT ONE TIME DOSE  Completed     HEPATITIS B VACCINES  Aged Out         I have discontinued Divina Blue's FLUoxetine. I am also having her start on sertraline. Additionally, I am having her maintain her HYDROcodone-acetaminophen, Chantix Starting Month Box, and varenicline.      HPI:  Divina Blue is a 55 y.o. year old who has a telephone visit for follow up.  Not stopped smoking yet.  Will quit and then restart.  States due to stress at home.  Son is angry and was fighting with girlfriend and that makes her anxiety go up.  Then will start smoking again.  Didn't see the pain clinic yet.  States did not get the informational packet.  Not taken the chantix yet - went to go pick it up and not in the bag.      Not taking her prozac. Stopped it on her own.          Review of Systems- pertinent positive in bold:  Constitutional: Fever, chills, night sweats, fainting, weight change, fatigue, seizures, dizziness, sleeping difficulties, loud snoring/pauses in breathing  Eyes: change in vision, blurred or double vision, redness/eye pain  Ears, nose, mouth, throat: change in hearing, ear pain, hoarseness, difficulty swallowing, sores in the mouth or throat  Respiratory: shortness of breath, cough, bloody sputum, wheezing  Cardiovascular: chest pain, palpitations   Gastrointestinal: abdominal pain, heartburn/indigestion, nausea/vomiting, change in appetite, change in bowel habits, constipation or diarrhea, rectal bleeding/dark stools, difficulty swallowing  Urinary: painful urination, frequent urination, urinary urgency/incontinence, blood in urine/dark urine, nocturia  Musculoskeletal: backache/back pain (new or increasing), weakness, joint pain/stiffness (new or increasing), muscle cramps, swelling  of hands, feet, ankles, leg pain/redness  Skin: change in moles/freckles, rash, nodules  Hematologic/lymphatic: swollen lymph glands, abnormal bruising/bleeding  Endocrine: excessive thirst/urination, cold or heat intolerance  Neurologic/emotional: worrisome memory change, numbness/tingling, anxiety, mood swings      Current Scheduled Meds:  Outpatient Encounter Medications as of 12/10/2020   Medication Sig Dispense Refill     HYDROcodone-acetaminophen (NORCO) 7.5-325 mg per tablet Take 1 tablet by mouth every 8 (eight) hours as needed for pain. 90 tablet 0     sertraline (ZOLOFT) 50 MG tablet Take 1 tablet (50 mg total) by mouth daily. 30 tablet 2     varenicline (CHANTIX STARTING MONTH BOX) 0.5 mg (11)- 1 mg (42) tablet 1 wk before you stop smoking take 0.5mg daily on days 1-3, 0.5mg 2 times each day on days 4-7, then 1mg 2 times daily. 53 tablet 0     varenicline (CHANTIX) 1 mg tablet Take 1 tab by mouth two times a day. Take after eating with a full glass of water. NOTE: Dispense as maintenance for refills only. 60 tablet 2     [DISCONTINUED] FLUoxetine (PROZAC) 20 MG capsule Take 1 capsule (20 mg total) by mouth daily. 90 capsule 0     No facility-administered encounter medications on file as of 12/10/2020.      Past Medical History:   Diagnosis Date     Arthritis      Chronic radicular pain of lower back 2019     Depression      DONNY (generalized anxiety disorder) 2019     Insomnia, unspecified type 2019     Severe episode of recurrent major depressive disorder, without psychotic features (H) 2019     SOB (shortness of breath) 2019     Past Surgical History:   Procedure Laterality Date     BACK SURGERY        SECTION      x 4     TONSILLECTOMY       Social History     Tobacco Use     Smoking status: Current Every Day Smoker     Packs/day: 1.00     Years: 33.00     Pack years: 33.00     Types: Cigarettes     Start date: 1980     Smokeless tobacco: Never Used     Tobacco  comment: Rolls her own cigarettes- goes through ~2lbs per month   Substance Use Topics     Alcohol use: No     Frequency: Never     Drug use: No       Objective / Physical Examination:  There were no vitals filed for this visit.  Wt Readings from Last 3 Encounters:   03/02/20 191 lb 1.6 oz (86.7 kg)   02/03/20 183 lb (83 kg)   01/28/20 176 lb 4.8 oz (80 kg)     BP Readings from Last 3 Encounters:   03/02/20 122/74   02/03/20 122/76   01/28/20 121/82     There is no height or weight on file to calculate BMI.   Results for orders placed or performed during the hospital encounter of 03/15/20   POCT CREATININE   Result Value Ref Range    iSTAT Creatinine 0.5 (L) 0.6 - 1.1 mg/dL    iSTAT GFR MDRD Af Amer >60 >60 mL/min/1.73m2    iSTAT GFR MDRD Non Af Amer >60 >60 mL/min/1.73m2           General Appearance: Alert and oriented, cooperative, affect appropriate, speech clear, in no apparent distress  Neuro: Alert and oriented, follows commands appropriately.     No orders of the defined types were placed in this encounter.  Followup: No follow-ups on file. earlier if needed.        Benita Rivera MD      Assessment/Plan:  1. Cigarette nicotine dependence without complication  Recommend smoking cessation and previously given chantix to take.    2. Chronic pain syndrome  Referral previously placed to pain clinic and will check on the status of that as patient states she never received any material packet.  Recommend patient establish with pain clinic as pain level increasing, inability to have surgery due to smoking status, options for possible injections, and hx of failed drug screen    3. Severe episode of recurrent major depressive disorder, without psychotic features (H)  Will change prozac to zoloft and see if any improvement in symptoms.  Patient has lots of stress and anxiety at home with son.  PCA available to patient.  Recommend seeing therapy.  - sertraline (ZOLOFT) 50 MG tablet; Take 1 tablet (50 mg total) by mouth  daily.  Dispense: 30 tablet; Refill: 2    4. DONNY (generalized anxiety disorder)  Will change to zoloft and see if any improvement.  - sertraline (ZOLOFT) 50 MG tablet; Take 1 tablet (50 mg total) by mouth daily.  Dispense: 30 tablet; Refill: 2        Phone call duration:  10 minutes    Benita Rivera MD

## 2021-06-13 NOTE — TELEPHONE ENCOUNTER
Spoke with patient regarding contacting the pain center for refill and make an appointment. Number given to patient to make appointment.

## 2021-06-13 NOTE — TELEPHONE ENCOUNTER
Medication: hydrocodone-acetaminophen ([place]) 7.5 - 325 mg  Pharmacy: [facility]  Last OV: 10/28/20 with Dr. Rivera  Follow up due:   Last refill: 10/28/0, #90 no refills authorized by Dr. Rivera    2. Chronic pain syndrome  Worsening and recommend referral to pain clinic to see if there is an alternative to her current medication that would provide her with more relief until surgery.    - Ambulatory referral to Pain Clinic     3. Chronic neck pain  Discussed long term use of opioids and recommend referral to pain management to determine if alternative options  - HYDROcodone-acetaminophen (NORCO) 7.5-325 mg per tablet; Take 1 tablet by mouth every 8 (eight) hours as needed for pain.  Dispense: 90 tablet; Refill: 0    Susi, CMA

## 2021-06-13 NOTE — TELEPHONE ENCOUNTER
Left message to call back for: Patient  Information to relay to patient:  LMTCB please relay providers message below when pt calls back.     Thank you

## 2021-06-13 NOTE — TELEPHONE ENCOUNTER
Controlled Substance Refill Request  Medication Name:   Requested Prescriptions     Pending Prescriptions Disp Refills     HYDROcodone-acetaminophen (NORCO) 7.5-325 mg per tablet [Pharmacy Med Name: HYDROCODONE-ACETAMINOP 7.5-325 TABS] 90 tablet 0     Sig: TAKE ONE TABLET BY MOUTH EVERY 8 HOURS AS NEEDED FOR PAIN     Date Last Fill: 10/28/20  [facility]  Submit electronically to pharmacy  Controlled Substance Agreement on file:   Encounter-Level CSA Scan Date - 10/31/2018:    Scan on 11/2/2018 11:53 AM        Last office visit:  12/10/20    .

## 2021-06-13 NOTE — TELEPHONE ENCOUNTER
Controlled Substance Refill Request  Medication Name:   Requested Prescriptions     Pending Prescriptions Disp Refills     HYDROcodone-acetaminophen (NORCO) 7.5-325 mg per tablet [Pharmacy Med Name: HYDROCODONE-ACETAMINOP 7.5-325 TABS] 90 tablet 0     Sig: TAKE ONE TABLET BY MOUTH EVERY 8 HOURS AS NEEDED FOR PAIN     Date Last Fill: 10/28/20  [facility]  Submit electronically to pharmacy  Controlled Substance Agreement on file:   Encounter-Level CSA Scan Date - 10/31/2018:    Scan on 11/2/2018 11:53 AM        Last office visit:  10/28/20

## 2021-06-15 NOTE — TELEPHONE ENCOUNTER
Attempted to contact Sydni. Unable to reach her and davide said a different number ten what was dialed so davide was not left.    If she calls back please see what she is specifically needing

## 2021-06-16 NOTE — TELEPHONE ENCOUNTER
Telephone Encounter by Claudia Lopez at 3/30/2020  2:35 PM     Author: Claudia Lopez Service: -- Author Type: --    Filed: 3/30/2020  2:36 PM Encounter Date: 3/30/2020 Status: Signed    : Claudia Lopez

## 2021-06-17 ENCOUNTER — COMMUNICATION - HEALTHEAST (OUTPATIENT)
Dept: INTERNAL MEDICINE | Facility: CLINIC | Age: 56
End: 2021-06-17

## 2021-06-17 ENCOUNTER — COMMUNICATION - HEALTHEAST (OUTPATIENT)
Dept: FAMILY MEDICINE | Facility: CLINIC | Age: 56
End: 2021-06-17

## 2021-06-17 NOTE — TELEPHONE ENCOUNTER
Telephone Encounter by Ct Sheets LPN at 7/17/2020 11:04 AM     Author: Ct Sheets LPN Service: -- Author Type: Certified Medical Assistant    Filed: 7/17/2020 11:06 AM Encounter Date: 7/17/2020 Status: Signed    : Ct Sheets LPN (Licensed Nurse)         Last Office Visit  5/14/2020 Benita Rivera MD    Notes:  1. Chronic neck pain  Will refill medication.  Urged patient to call surgery and find out the status of post-covid restart to plan when she would be able to get surgery.  Still recommend C4-C7 anterior cervical disectomy and fusion  - HYDROcodone-acetaminophen (NORCO) 7.5-325 mg per tablet; Take 1 tablet by mouth every 8 (eight) hours as needed for pain.  Dispense: 90 tablet; Refill: 0     2. Chronic, continuous use of opioids  Stable.  Continue on current medication  - HYDROcodone-acetaminophen (NORCO) 7.5-325 mg per tablet; Take 1 tablet by mouth every 8 (eight) hours as needed for pain.  Dispense: 90 tablet; Refill: 0     3. Cigarette nicotine dependence without complication  Discussed smoking cessation at length.  Pt does not wish to quit permanently but feels like she is improving with improved home life     4. Severe episode of recurrent major depressive disorder, without psychotic features (H)  Continue to follow up with behavioral health but seems to be improving overall.    Last Filled:  HYDROcodone-acetaminophen (NORCO) 7.5-325 mg per tablet  90 tablet  0  6/17/2020   No    Sig: TAKE ONE TABLET BY MOUTH EVERY 8 HOURS AS NEEDED FOR PAIN    Sent to pharmacy as: HYDROcodone 7.5 mg-acetaminophen 325 mg tablet (NORCO)    Earliest Fill Date: 6/17/2020    E-Prescribing Status: Receipt confirmed by pharmacy (6/17/2020  1:30 PM CDT)          Lab Results   Component Value Date    AMPHET Screen Negative 02/04/2020    HEBENZODIAZ Screen Negative 02/04/2020    OPIATES (!) 02/04/2020     Screen Positive (Confirmation available on request)    PCP Screen Negative 02/04/2020    THC  Screen Negative 02/04/2020    BARBIT Screen Negative 02/04/2020    COCAINEMETAB Screen Negative 02/04/2020    METHADNE Screen Negative 02/04/2020    OXYCODONE (!) 02/04/2020     Screen Positive (Confirmation available on request)    RITAR 86.1 02/04/2020         Next OV:  Visit date not found      Encounter-Level CSA Scan Date - 10/31/2018:    Scan on 11/2/2018 11:53 AM  Scan 11/06/2019        reviewed and results are as follows:        Medication teed up for provider signature - please adjust as appropriate.

## 2021-06-17 NOTE — TELEPHONE ENCOUNTER
Telephone Encounter by Jaelyn Sifuentes CMA at 4/29/2020  5:20 PM     Author: Jaelyn Sifuentes CMA Service: -- Author Type: Certified Medical Assistant    Filed: 4/29/2020  5:23 PM Encounter Date: 4/29/2020 Status: Signed    : Jaelyn Sifuentes CMA (Certified Medical Assistant)         Last Office Visit  3/2/2020 Benita Rivera MD    Notes:    Additional provider notes:Divina Blue she has been on the Wellbutrin for smoking cessation. She has been smoke free during the day and now is smoking in the evening 1-2, now she is back to smoking 1 pack per day.  She did not have any side effects from this medication, states it did not make her more alert or jittery, it did make her feel a little sleepy during the day so she took it later in the afternoon.  She was also told by her  that she might benefit from a Nicotrol inhaler, she is wondering about getting that as well.  She feels that this will keep her from buying cigarettes.  She also needs her refills of her hydrocodone.  She states that her primary care doctor, Benita Rivera MD was in the process of increasing her pain medication.  She had been 4 times daily of 10 mg/325 hydrocodone/acetaminophen when she started with her current provider, she was told that she could have 7.5/325 hydrocodone/acetaminophen and that she also needed to go to the pain clinic.  She was also referred to psychiatry.  She states that she is still having difficulty standing due to lumbar disc disease, cannot stand to cook more than about 10 minutes before she has to sit down.  She does not report muscle weakness, numbness, tingling of lower extremities.  Review of systems as in HPI and negative for fever, cough, congestion, malaise, myalgias.  Last Filled:    Lab Results   Component Value Date    AMPHET Screen Negative 02/04/2020    HEBENZODIAZ Screen Negative 02/04/2020    OPIATES (!) 02/04/2020     Screen Positive (Confirmation available on request)    PCP Screen  Negative 02/04/2020    THC Screen Negative 02/04/2020    BARBIT Screen Negative 02/04/2020    COCAINEMETAB Screen Negative 02/04/2020    METHADNE Screen Negative 02/04/2020    OXYCODONE (!) 02/04/2020     Screen Positive (Confirmation available on request)    CREAUR 86.1 02/04/2020         Next OV:  Visit date not found      Encounter-Level CSA Scan Date - 10/31/2018:    Scan on 11/2/2018 11:53 AM          reviewed and results are as follows:      Medication teed up for provider signature - please adjust as appropriate.

## 2021-06-17 NOTE — TELEPHONE ENCOUNTER
Telephone Encounter by Claudia Lopez at 4/1/2020  2:29 PM     Author: Claudia Lopez Service: -- Author Type: --    Filed: 4/1/2020  2:29 PM Encounter Date: 3/30/2020 Status: Signed    : Claudia Lopez APPROVED:    Approval start date: 3/2/2020  Approval end date:  4/1/2021    Pharmacy has been notified of approval and will contact patient when medication is ready for pickup.

## 2021-06-18 NOTE — LETTER
Letter by Benita Rivera MD at      Author: Benita Rivera MD Service: -- Author Type: --    Filed:  Encounter Date: 1/31/2019 Status: (Other)       January 31, 2019     Patient: Divina Blue   YOB: 1965   Date of Visit: 1/31/2019       To Whom it May Concern:    Divina Blue was seen in my clinic on 1/31/2019.    If you have any questions or concerns, please don't hesitate to call.    Sincerely,         Electronically signed by Benita Rivera MD

## 2021-06-18 NOTE — LETTER
Letter by Casey Goldberg MD at      Author: Casey Goldberg MD Service: -- Author Type: --    Filed:  Encounter Date: 1/7/2019 Status: (Other)       Divina Blue  [address] [place] [address]    January 7, 2019    Dear Ms. Blue,    Welcome to [facility]! Your appointment information is below.   Please bring the following to your appointment:    Insurance Card, so we may scan it for our records    Drivers license or valid ID, so we may scan it for our records    Co-pay (as applicable per your insurance plan)    A current list of your medications including over the counter products such as vitamins and supplements    Your medical records including copies of X-Ray films if you are transferring your care from another clinic.  If you do not have your records, please fill out the release of information form and we will request those records.     Provider: aCsey Goldberg MD  Appointment Date:   Tuesday, February 12, 2019  Arrival Time:   8:00 PFT,  9:00 dr appt    Location: [facility]         [address]        [place], [address]    **Please allow adequate time for your commute and parking. If you are more than 10 minutes late, you may be asked to reschedule.     If you need to cancel or reschedule your appointment, please notify us at least 24 hours prior to your appointment time so we are able to make this time available for another patient.    Thank you for choosing the [facility] for your health care needs. If you have any questions, please do not hesitate to contact us at any time at   [phone]. We look forward to caring for you.     Sincerely,     [facility] staff

## 2021-06-19 NOTE — LETTER
Letter by Benita Rivera MD at      Author: Benita Rivera MD Service: -- Author Type: --    Filed:  Encounter Date: 2019 Status: (Other)         Divnia Blue  [address] [place] [address]      May 9, 2019      Dear Divina Blue,   : 1965      This letter is in regards to the appointment that you had scheduled on 19 at the [facility] with    Dr. Benita Rivera.     The [facility] strives to see all patients in a timely manner and we need your help to achieve this.  The above-mentioned appointment was missed and we do not have record of a cancellation by you.  Whenever possible, we request appointment cancellations at least 24 hours in advance.  This time allows us to offer the appointment to another patient in need.      If you feel you have received this letter in error, or if you need to reschedule this appointment, please call our office so that we may update our records.      Sincerely,    [facility]

## 2021-06-19 NOTE — LETTER
Letter by Benita Rivera MD at      Author: Benita Rivera MD Service: -- Author Type: --    Filed:  Encounter Date: 8/30/2019 Status: (Other)         Divina Blue  [address] [place] [address]             August 30, 2019         Dear Ms. Blue,    Below are the results from your recent visit:    Resulted Orders   Comprehensive Metabolic Panel   Result Value Ref Range    Sodium 139 136 - 145 mmol/L    Potassium 4.5 3.5 - 5.0 mmol/L    Chloride 106 98 - 107 mmol/L    CO2 24 22 - 31 mmol/L    Anion Gap, Calculation 9 5 - 18 mmol/L    Glucose 98 70 - 125 mg/dL    BUN 10 8 - 22 mg/dL    Creatinine 0.89 0.60 - 1.10 mg/dL    GFR MDRD Af Amer >60 >60 mL/min/1.73m2    GFR MDRD Non Af Amer >60 >60 mL/min/1.73m2    Bilirubin, Total 0.3 0.0 - 1.0 mg/dL    Calcium 10.0 8.5 - 10.5 mg/dL    Protein, Total 7.7 6.0 - 8.0 g/dL    Albumin 3.8 3.5 - 5.0 g/dL    Alkaline Phosphatase 108 45 - 120 U/L    AST 23 0 - 40 U/L    ALT 28 0 - 45 U/L    Narrative    Fasting Glucose reference range is 70-99 mg/dL per  American Diabetes Association (ADA) guidelines.   Drug Abuse 1+, Urine   Result Value Ref Range    Amphetamines Screen Negative Screen Negative    Benzodiazepines Screen Negative Screen Negative    Opiates (!) Screen Negative     Screen Positive (Confirmation available on request)    Phencyclidine Screen Negative Screen Negative    THC Screen Negative Screen Negative    Barbiturates Screen Negative Screen Negative    Cocaine Metabolite Screen Negative Screen Negative    Methadone Screen Negative Screen Negative    Oxycodone Screen Negative Screen Negative    Creatinine, Urine 147.9 mg/dL    Narrative    Drug                           Screening Threshold    Amphetamines                    1000 ng/mL  Benzodiazepine                   200 ng/mL  Opiates                          300 ng/mL  Phencyclidine                     25 ng/mL  THC Metabolite                    50 ng/mL  Barbiturates                     200  ng/mL  Cocaine Metabolite               150 ng/mL  Methadone                        300 ng/mL  Oxycodone                        100 ng/mL    Screening results are to be used only for medical purposes.  Unconfirmed screening results are not to be used for non-  medical purposes.   HM1 (CBC with Diff)   Result Value Ref Range    WBC 10.1 4.0 - 11.0 thou/uL    RBC 4.73 3.80 - 5.40 mill/uL    Hemoglobin 13.1 12.0 - 16.0 g/dL    Hematocrit 40.2 35.0 - 47.0 %    MCV 85 80 - 100 fL    MCH 27.7 27.0 - 34.0 pg    MCHC 32.6 32.0 - 36.0 g/dL    RDW 14.1 11.0 - 14.5 %    Platelets 398 140 - 440 thou/uL    MPV 10.2 8.5 - 12.5 fL    Neutrophils % 67 50 - 70 %    Lymphocytes % 21 20 - 40 %    Monocytes % 7 2 - 10 %    Eosinophils % 4 0 - 6 %    Basophils % 1 0 - 2 %    Neutrophils Absolute 6.7 2.0 - 7.7 thou/uL    Lymphocytes Absolute 2.1 0.8 - 4.4 thou/uL    Monocytes Absolute 0.7 0.0 - 0.9 thou/uL    Eosinophils Absolute 0.4 0.0 - 0.4 thou/uL    Basophils Absolute 0.1 0.0 - 0.2 thou/uL   Lipid Cascade RANDOM   Result Value Ref Range    Cholesterol 203 (H) <=199 mg/dL    Triglycerides 203 (H) <=149 mg/dL    HDL Cholesterol 29 (L) >=50 mg/dL    LDL Calculated 133 (H) <=129 mg/dL    Patient Fasting > 8hrs? Yes         Result is/are normal.  Cholesterol slightly elevated and recommend low fat diet and exercise.   Continue current medications.  Call if any questions or concerns    Please call with questions or contact us using doxo.    Sincerely,        Electronically signed by Benita Rivera MD

## 2021-06-19 NOTE — LETTER
Letter by Benita Rivera MD at      Author: Benita Rivera MD Service: -- Author Type: --    Filed:  Encounter Date: 2019 Status: (Other)         2019    Divina Blue   [address] [place] [address]   : 1965       Patient prescription:      RX:  Shower chair and adjustable shower head    DX:      ICD-10-CM    1. Chronic radicular pain of lower back M54.16     G89.29         Weight: 204 lb 6.4 oz (92.7 kg)   Height: 5' (1.524 m)            LENGTH OF NEED (if appropriate):  99 years    Benita Rivera MD  [facility] [id number]   [place] License [id number]

## 2021-06-19 NOTE — LETTER
Letter by Benita Rivera MD at      Author: Benita Rivera MD Service: -- Author Type: --    Filed:  Encounter Date: 10/3/2019 Status: Signed         [facility]  10/03/19    Patient: Divina Blue  YOB: 1965  Medical Record Number: [id number]                                                                  Opioid / Opioid Plus Controlled Substance Agreement    I understand that my care provider has prescribed an opioid (narcotic) controlled substance to help manage my condition(s). I am taking this medicine to help me function or work. I know this is strong medicine, and that it can cause serious side effects. Opioid medicine can be sedating, addicting and may cause a dependency on the drug. They can affect my ability to drive or think, and cause depression. They need to be taken exactly as prescribed. Combining opioids with certain medicines or chemicals (such as cocaine, sedatives and tranquilizers, sleeping pills, meth) can be dangerous or even fatal. Also, if I stop opioids suddenly, I may have severe withdrawal symptoms. Last, I understand that opioids do not work for all types of pain nor for all patients. If not helpful, I may be asked to stop them.        The risks, benefits, and side effects of these medicine(s) were explained to me. I agree that:    1. I will take part in other treatments as advised by my care team. This may be psychiatry or counseling, physical therapy, behavioral therapy, group treatment or a referral to a pain clinic. I will reduce or stop my medicine when my care team tells me to do so.  2. I will take my medicines as prescribed. I will not change the dose or schedule unless my care team tells me to. There will be no refills if I run out early.  I may be contactedwithout warning and asked to complete a urine drug test or pill count at any time.   3. I will keep all my appointments, and understand this is part of the monitoring of opioids. My care team  may require an office visit for EVERY opioid/controlled substance refill. If I miss appointments or dont follow instructions, my care team may stop my medicine.  4. I will not ask other providers to prescribe controlled substances, and I will not accept controlled substances from other people. If I need another prescribed controlled substance for a new reason, I will tell my care team within 1 business day.  5. I will use one pharmacy to fill all of my controlled substance prescriptions, and it is up to me to make sure that I do not run out of my medicines on weekends or holidays. If my care team is willing to refill my opioid prescription without a visit, I must request refills only during office hours, refills may take up to 3 days to process, and it may take up to 5 to 7 days for my medicine to be mailed and ready at my pharmacy. Prescriptions will not be mailed anywhere except my pharmacy.        [id number]  Rev 12/18         Registration to scan to EHR                             Page 1 of 2               Controlled Substance Agreement Opioid        [facility]  10/03/19  Patient: Divina Blue  YOB: 1965  Medical Record Number: [id number]                                                                  6. I am responsible for my prescriptions. If the medicine/prescription is lost or stolen, it will not be replaced. I also agree not to share controlled substance medicines with anyone.  7. I agree to not use ANY illegal or recreational drugs. This includes marijuana, cocaine, bath salts or other drugs. I agree not to use alcohol unless my care team says I may.          I agree to give urine samples whenever asked. If I dont give a urine sample, the care team may stop my medicine.    8. If I enroll in the [place] Medical Marijuana program, I will tell my care team. I will also sign an agreement to share my medical records with my care team.   9. I will bring in my list of medicines (or my  medicine bottles) each time I come to the clinic.   10. I will tell my care team right away if I become pregnant or have a new medical problem treated outside of my regular clinic.  11. I understand that this medicine can affect my thinking and judgment. It may be unsafe for me to drive, use machinery and do dangerous tasks. I will not do any of these things until I know how the medicine affects me. If my dose changes, I will wait to see how it affects me. I will contact my care team if I have concerns about medicine side effects.    I understand that if I do not follow any of the conditions above, my prescriptions or treatment may be stopped.      I agree that my provider, clinic care team, and pharmacy may work with any city, state or federal law enforcement agency that investigates the misuse, sale, or other diversion of my controlled medicine. I will allow my provider to discuss my care with or share a copy of this agreement with any other treating provider, pharmacy or emergency room where I receive care. I agree to give up (waive) any right of privacy or confidentiality with respect to these consents.     I have read this agreement and have asked questions about anything I did not understand.      ________________________________________________________________________  Patient signature - Date/Time -  Divina Blue                                      ________________________________________________________________________  Witness signature                                                            ________________________________________________________________________  Provider signature - Benita Rivera MD      [id number]  Rev 12/18         Registration to scan to EHR                         Page 2 of 2                   Controlled Substance Agreement Opioid           Page 1 of 2  Opioid Pain Medicines (also known as Narcotics)  What You Need to Know    What are opioids?   Opioids are pain medicines that  must be prescribed by a doctor.  They are also known as narcotics.    Examples are:     morphine (MS Contin, Leslie)    oxycodone (Oxycontin)    oxycodone and acetaminophen (Percocet)    hydrocodone and acetaminophen (Vicodin, Norco)     fentanyl patch (Duragesic)     hydromorphone (Dilaudid)     methadone     What do opioids do well?   Opioids are best for short-term pain after a surgery or injury. They also work well for cancer pain. Unlike other pain medicines, they do not cause liver or kidney failure or ulcers. They may help some people with long-lasting (chronic) pain.     What do opioids NOT do well?   Opioids never get rid of pain entirely, and they do not work well for most patients with chronic pain. Opioids do not reduce swelling, one of the causes of pain. They also dont work well for nerve pain.                           For informational purposes only.  Not to replace the advice of your care provider.  Copyright [facility]. All right reserved. [employer] 143030-Cxq 02/18.      Page 2 of 2    Risks and side effects   Talk to your doctor before you start or decide to keep taking one of these medicines. Side effects include:    Lowering your breathing rate enough to cause death    Overdose, including death, especially if taking higher than prescribed doses    Long-term opioid use    Worse depression symptoms; less pleasure in things you usually enjoy    Feeling tired or sluggish    Slower thoughts or cloudy thinking    Being more sensitive to pain over time; pain is harder to control    Trouble sleeping or restless sleep    Changes in hormone levels (for example, less testosterone)    Changes in sex drive or ability to have sex    Constipation    Unsafe driving    Itching and sweating    Feeling dizzy    Nausea, vomiting and dry mouth    What else should I know about opioids?  When someone takes opioids for too long or too often, they become dependent. This means that if you stop or  reduce the medicine too quickly, you will have withdrawal symptoms.    Dependence is not the same as addiction. Addiction is when people keep using a substance that harms their body, their mind or their relations with others. If you have a history of drug or alcohol abuse, taking opioids can cause a relapse.    Over time, opioids dont work as well. Most people will need higher and higher doses. The higher the dose, the more serious the side effects. We dont know the long-term effects of opioids.      Prescribed opioids aren't the best way to manage chronic pain    Other ways to manage pain include:      Ibuprofen or acetaminophen.  You should always try this first.      Treat health problems that may be causing pain.      acupuncture or massage, deep breathing, meditation, visual imagery, aromatherapy.      Use heat or ice at the pain site      Physical therapy and exercise      Stop smoking      See a counselor or therapist                                                  People who have used opioids for a long time may have a lower quality of life, worse depression, higher levels of pain and more visits to doctors.    Never share your opioids with others. Be sure to store opioids in a secure place, locked if possible.Young children can easily swallow them and overdose.     You can overdose on opioids.  Signs of overdose include decrease or loss of consciousness, slowed breathing, trouble waking and blue lips.  If someone is worried about overdose, they should call 911.    If you are at risk for overdose, you may get naloxone (Narcan, a medicine that reverses the effects of opioids.  If you overdose, a friend or family member can give you Narcan while waiting for the ambulance.  They need to know the signs of overdose and how to give Narcan.    While you're taking opioids:    Don't use alcohol or street drugs. Taking them together can cause death.    Don't take any of these medicines unless your doctor says its  okay.  Taking these with opioids can cause death.    Benzodiazepines (such as lorazepam         or diazepam)    Muscle relaxers (such as cyclobenzaprine)    sleeping pills    other opioids    Safe disposal of opioids  Find your area drug take-back program, your pharmacy mail-back program, buy a special disposal bag (such as Deterra) from your pharmacy or flush them down the toilet.  Use the guidelines at:  www.fda.gov/drugs/resourcesforyou

## 2021-06-19 NOTE — LETTER
Letter by Benita Rivera MD at      Author: Benita Rivera MD Service: -- Author Type: --    Filed:  Encounter Date: 2019 Status: (Other)         19        To Whom it May Concern        Divina SMITH 1965  has been my patient since 2018. I am writing to request an appeal of the decision to cover lunesta. Alternative treatments tried include benadryl, mirtazapine, doxylamine, trazodone, melatonin, and seroquel. Patient is currently on opioids with a controlled substance contract and temazepam is contraindicated in this patient.      Please reconsider coverage of this medically necessary medication for this patient.               Benita Rivera MD

## 2021-06-19 NOTE — LETTER
Letter by Benita Rivera MD at      Author: Benita Rivera MD Service: -- Author Type: --    Filed:  Encounter Date: 2019 Status: (Other)         Divina Blue  [address] [place] [address]      2019      Dear Divina Blue,   : 1965      This letter is in regards to the appointment that you had scheduled on 7.3.19 at the [facility] with Dr. Benita Rivera.     The [facility] strives to see all patients in a timely manner and we need your help to achieve this.  The above-mentioned appointment was missed and we do not have record of a cancellation by you.  Whenever possible, we request appointment cancellations at least 24 hours in advance.  This time allows us to offer the appointment to another patient in need.      If you feel you have received this letter in error, or if you need to reschedule this appointment, please call our office so that we may update our records.      Sincerely,    [facility]

## 2021-06-19 NOTE — LETTER
Letter by Benita Rivera MD at      Author: Benita Rivera MD Service: -- Author Type: --    Filed:  Encounter Date: 8/29/2019 Status: (Other)         August 29, 2019     Patient: Divina Blue   YOB: 1965   Date of Visit: 8/29/2019       To Whom It May Concern:    It is my medical opinion that Divina Blue would benefit emotionally from having a dog in the housing complex with her.  This animal would support her emotional and mental wellbeing.    If you have any questions or concerns, please don't hesitate to call.    Sincerely,        Electronically signed by Benita Rivera MD

## 2021-06-21 NOTE — LETTER
Letter by Benita Rivera MD at      Author: Benita Rivera MD Service: -- Author Type: --    Filed:  Encounter Date: 10/28/2020 Status: (Other)         [facility]  10/28/20    Patient: Divina Blue  YOB: 1965  Medical Record Number: [id number]  CSN: [id number]                                                                              Non-opioid Controlled Substance Agreement    I understand that my care provider has prescribed a controlled substance to help manage my condition(s). I am taking this medicine to help me function or work. I know this is strong medicine, and that it can cause serious side effects. Controlled substances can be sedating, addicting and may cause a dependency on the drug. They can affect my ability to drive or think, and cause depression. They need to be taken exactly as prescribed. Combining controlled substances with certain medicines or chemicals (such as cocaine, sedatives and tranquilizers, sleeping pills, meth) can be dangerous or even fatal. Also, if I stop controlled substances suddenly, I may have severe withdrawal symptoms.  If not helpful, I may be asked to stop them.    The risks, benefits, and side effects of these medicine(s) were explained to me. I agree that:    1. I will take part in other treatments as advised by my care team. This may be psychiatry or counseling, physical therapy, behavioral therapy, group treatment or a referral to a pain clinic. I will reduce or stop my medicine when my care team tells me to do so.  2. I will take my medicines as prescribed. I will not change the dose or schedule unless my care team tells me to. There will be no refills if I run out early.  I may be contactedwithout warning and asked to complete a urine drug test or pill count at any time.   3. I will keep all my appointments, and understand this is part of the monitoring of controlled substances. My care team may require an office visit for  EVERY controlled substance refill. If I miss appointments or dont follow instructions, my care team may stop my medicine.  4. I will not ask other providers to prescribe controlled substances, and I will not accept controlled substances from other people. If I need another prescribed controlled substance for a new reason, I will tell my care team within 1 business day.  5. I will use one pharmacy to fill all of my controlled substance prescriptions, and it is up to me to make sure that I do not run out of my medicines on weekends or holidays. If my care team is willing to refill my controlled substance prescription without a visit, I must request refills only during office hours, refills may take up to 3 days to process, and it may take up to 5 to 7 days for my medicine to be mailed and ready at my pharmacy. Prescriptions will not be mailed anywhere except my pharmacy.    6. I am responsible for my prescriptions. If the medicine/prescription is lost or stolen, it will not be replaced. I also agree not to share controlled substance medicines with anyone.          [facility]  10/28/20  Patient:  Divina Blue  YOB: 1965  Medical Record Number: [id number]  CSN: [id number]    7. I agree to not use ANY illegal or recreational drugs. This includes marijuana, cocaine, bath salts or other drugs. I agree not to use alcohol unless my care team says I may. I agree to give urine samples whenever asked. If I dont give a urine sample, the care team may stop my medicine.    8. If I enroll in the [place] Medical Marijuana program, I will tell my care team. I will also sign an agreement to share my medical records with my care team.    9. I will bring in my list of medicines (or my medicine bottles) each time I come to the clinic.   10. I will tell my care team right away if I become pregnant or have a new medical problem treated outside of my regular clinic.  11. I understand that this medicine  can affect my thinking and judgment. It may be unsafe for me to drive, use machinery and do dangerous tasks. I will not do any of these things until I know how the medicine affects me. If my dose changes, I will wait to see how it affects me. I will contact my care team if I have concerns about medicine side effects.    I understand that if I do not follow any of the conditions above, my prescriptions or treatment may be stopped.      I agree that my provider, clinic care team, and pharmacy may work with any city, state or federal law enforcement agency that investigates the misuse, sale, or other diversion of my controlled medicine. I will allow my provider to discuss my care with or share a copy of this agreement with any other treating provider, pharmacy or emergency room where I receive care. I agree to give up (waive) any right of privacy or confidentiality with respect to these consents.   I have read this agreement and have asked questions about anything I did not understand.    ___________________________________________________________________________  Patient signature - Date/Time  -Divina Blue                                      ___________________________________________________________________________  Witness signature                                                                    ___________________________________________________________________________  Provider signature- Benita Rivera MD

## 2021-06-21 NOTE — LETTER
Letter by Benita Rivera MD at      Author: Benita Rivera MD Service: -- Author Type: --    Filed:  Encounter Date: 10/28/2020 Status: (Other)         [facility]  10/28/20    Patient: Divina Blue  YOB: 1965  Medical Record Number: [id number]                                                                  Opioid / Opioid Plus Controlled Substance Agreement    I understand that my care provider has prescribed an opioid (narcotic) controlled substance to help manage my condition(s). I am taking this medicine to help me function or work. I know this is strong medicine, and that it can cause serious side effects. Opioid medicine can be sedating, addicting and may cause a dependency on the drug. They can affect my ability to drive or think, and cause depression. They need to be taken exactly as prescribed. Combining opioids with certain medicines or chemicals (such as cocaine, sedatives and tranquilizers, sleeping pills, meth) can be dangerous or even fatal. Also, if I stop opioids suddenly, I may have severe withdrawal symptoms. Last, I understand that opioids do not work for all types of pain nor for all patients. If not helpful, I may be asked to stop them.      The risks, benefits, and side effects of these medicine(s) were explained to me. I agree that:    1. I will take part in other treatments as advised by my care team. This may be psychiatry or counseling, physical therapy, behavioral therapy, group treatment or a referral to a pain clinic. I will reduce or stop my medicine when my care team tells me to do so.  2. I will take my medicines as prescribed. I will not change the dose or schedule unless my care team tells me to. There will be no refills if I run out early.  I may be contactedwithout warning and asked to complete a urine drug test or pill count at any time.   3. I will keep all my appointments, and understand this is part of the monitoring of opioids. My  care team may require an office visit for EVERY opioid/controlled substance refill. If I miss appointments or dont follow instructions, my care team may stop my medicine.  4. I will not ask other providers to prescribe controlled substances, and I will not accept controlled substances from other people. If I need another prescribed controlled substance for a new reason, I will tell my care team within 1 business day.  5. I will use one pharmacy to fill all of my controlled substance prescriptions, and it is up to me to make sure that I do not run out of my medicines on weekends or holidays. If my care team is willing to refill my opioid prescription without a visit, I must request refills only during office hours, refills may take up to 3 days to process, and it may take up to 5 to 7 days for my medicine to be mailed and ready at my pharmacy. Prescriptions will not be mailed anywhere except my pharmacy.        [id number]  Rev 12/18         Registration to scan to EHR                             Page 1 of 2               Controlled Substance Agreement [facility]  10/28/20  Patient: Divina Blue  YOB: 1965  Medical Record Number: [id number]                                                                  6. I am responsible for my prescriptions. If the medicine/prescription is lost or stolen, it will not be replaced. I also agree not to share controlled substance medicines with anyone.  7. I agree to not use ANY illegal or recreational drugs. This includes marijuana, cocaine, bath salts or other drugs. I agree not to use alcohol unless my care team says I may.          I agree to give urine samples whenever asked. If I dont give a urine sample, the care team may stop my medicine.    8. If I enroll in the [place] Medical Marijuana program, I will tell my care team. I will also sign an agreement to share my medical records with my care team.   9. I will bring in my list  of medicines (or my medicine bottles) each time I come to the clinic.   10. I will tell my care team right away if I become pregnant or have a new medical problem treated outside of my regular clinic.  11. I understand that this medicine can affect my thinking and judgment. It may be unsafe for me to drive, use machinery and do dangerous tasks. I will not do any of these things until I know how the medicine affects me. If my dose changes, I will wait to see how it affects me. I will contact my care team if I have concerns about medicine side effects.    I understand that if I do not follow any of the conditions above, my prescriptions or treatment may be stopped.      I agree that my provider, clinic care team, and pharmacy may work with any city, state or federal law enforcement agency that investigates the misuse, sale, or other diversion of my controlled medicine. I will allow my provider to discuss my care with or share a copy of this agreement with any other treating provider, pharmacy or emergency room where I receive care. I agree to give up (waive) any right of privacy or confidentiality with respect to these consents.     I have read this agreement and have asked questions about anything I did not understand.      ________________________________________________________________________  Patient signature - Date/Time -  Divina Blue                                      ________________________________________________________________________  Witness signature                                                            ________________________________________________________________________  Provider signature - Benita Rivera MD      [id number]  Rev 12/18         Registration to scan to EHR                         Page 2 of 2                   Controlled Substance Agreement Opioid           Page 1 of 2  Opioid Pain Medicines (also known as Narcotics)  What You Need to Know    What are opioids?   Opioids are  pain medicines that must be prescribed by a doctor.  They are also known as narcotics.    Examples are:     morphine (MS Contin, Leslie)    oxycodone (Oxycontin)    oxycodone and acetaminophen (Percocet)    hydrocodone and acetaminophen (Vicodin, Norco)     fentanyl patch (Duragesic)     hydromorphone (Dilaudid)     methadone     What do opioids do well?   Opioids are best for short-term pain after a surgery or injury. They also work well for cancer pain. Unlike other pain medicines, they do not cause liver or kidney failure or ulcers. They may help some people with long-lasting (chronic) pain.     What do opioids NOT do well?   Opioids never get rid of pain entirely, and they do not work well for most patients with chronic pain. Opioids do not reduce swelling, one of the causes of pain. They also dont work well for nerve pain.                           For informational purposes only.  Not to replace the advice of your care provider.  Copyright [address] [facility]. All right reserved. Arnica 126918-Czp 02/18.      Page 2 of 2    Risks and side effects   Talk to your doctor before you start or decide to keep taking one of these medicines. Side effects include:    Lowering your breathing rate enough to cause death    Overdose, including death, especially if taking higher than prescribed doses    Long-term opioid use    Worse depression symptoms; less pleasure in things you usually enjoy    Feeling tired or sluggish    Slower thoughts or cloudy thinking    Being more sensitive to pain over time; pain is harder to control    Trouble sleeping or restless sleep    Changes in hormone levels (for example, less testosterone)    Changes in sex drive or ability to have sex    Constipation    Unsafe driving    Itching and sweating    Feeling dizzy    Nausea, vomiting and dry mouth    What else should I know about opioids?  When someone takes opioids for too long or too often, they become dependent. This means that  if you stop or reduce the medicine too quickly, you will have withdrawal symptoms.    Dependence is not the same as addiction. Addiction is when people keep using a substance that harms their body, their mind or their relations with others. If you have a history of drug or alcohol abuse, taking opioids can cause a relapse.    Over time, opioids dont work as well. Most people will need higher and higher doses. The higher the dose, the more serious the side effects. We dont know the long-term effects of opioids.      Prescribed opioids aren't the best way to manage chronic pain    Other ways to manage pain include:      Ibuprofen or acetaminophen.  You should always try this first.      Treat health problems that may be causing pain.      acupuncture or massage, deep breathing, meditation, visual imagery, aromatherapy.      Use heat or ice at the pain site      Physical therapy and exercise      Stop smoking      See a counselor or therapist                                                  People who have used opioids for a long time may have a lower quality of life, worse depression, higher levels of pain and more visits to doctors.    Never share your opioids with others. Be sure to store opioids in a secure place, locked if possible.Young children can easily swallow them and overdose.     You can overdose on opioids.  Signs of overdose include decrease or loss of consciousness, slowed breathing, trouble waking and blue lips.  If someone is worried about overdose, they should call 911.    If you are at risk for overdose, you may get naloxone (Narcan, a medicine that reverses the effects of opioids.  If you overdose, a friend or family member can give you Narcan while waiting for the ambulance.  They need to know the signs of overdose and how to give Narcan.    While you're taking opioids:    Don't use alcohol or street drugs. Taking them together can cause death.    Don't take any of these medicines unless your  doctor says its okay.  Taking these with opioids can cause death.    Benzodiazepines (such as lorazepam         or diazepam)    Muscle relaxers (such as cyclobenzaprine)    sleeping pills    other opioids    Safe disposal of opioids  Find your area drug take-back program, your pharmacy mail-back program, buy a special disposal bag (such as Deterra) from your pharmacy or flush them down the toilet.  Use the guidelines at:  www.fda.gov/drugs/resourcesforyou

## 2021-06-21 NOTE — PROGRESS NOTES
Family Medicine Office Visit  [facility]  Patient Name: Divina Blue  Patient Age: 52 y.o.  YOB: 1965  MRN: [id number]    Date of Visit: 10/31/2018  Reason for Office Visit:   Chief Complaint   Patient presents with     Establish Care     Back pain and Depression           Assessment / Plan / Medical Decision Makin. Chronic radicular pain of lower back  MRI and referral to the spine center.  Start norco.  Current treatment plan:  10 mg of norco, 4 times per day (120 pills per day)  Reviewed goals of care.  Benefits continue to outweigh the risks of continued use of opioid analgesia.  Prescription monitoring database has been reviewed and shows no aberrant use.  PEG Pain Screening Tool completed and reviewed and patient continues to demonstrate functional improvement as a result of opioid analgesia.  Reviewed side effects from current therapy.  Plan to follow-up in 1 months  Number of refills allowed prior to follow-up visit: 0    - MR Lumbar Spine With Without Contrast; Future  - MR Thoracic Spine With Without Contrast; Future  - Ambulatory referral to Spine Care  - MR Lumbar Spine With Without Contrast  - MR Thoracic Spine With Without Contrast    2. Chronic neck pain  - MR Cervical Spine With Without Contrast; Future  - Ambulatory referral to Spine Care  - MR Cervical Spine With Without Contrast    3. Visit for screening mammogram  Discussed screening mammogram and plan to order at next visit    4. Screen for colon cancer  Plan to discuss at next visit    5. Major depression, recurrent (H)  Start prozac.  Continue with therapy.  Discussed medication and how to take it, potential JIMMIE and if any worsening symptoms to immediately go to the ER    6. DONNY (generalized anxiety disorder)  Start prozac and continue to monitor        Health Maintenance Review  Health Maintenance   Topic Date Due     DEPRESSION FOLLOW UP  1965     MAMMOGRAM  1965      "ADVANCE DIRECTIVES DISCUSSED WITH PATIENT  11/30/1982     PAP SMEAR  11/30/1994     COLONOSCOPY  11/30/2014     INFLUENZA VACCINE RULE BASED (1) 08/01/2018     TD 18+ HE  03/06/2022     TDAP ADULT ONE TIME DOSE  Completed         I am having Ms. Blue start on HYDROcodone-acetaminophen and FLUoxetine.      HPI:  Divina Blue is a 52 y.o. year old who presents to the office today for establish care.  Pt has a hx of lower back pain with multiple surgeries at [facility].  This includes fusion from L4-L6, L4-L5 laminectomy.  States hx of being thrown off of a horse and went to [facility] spine.  Multiple surgeries with the last surgery resulting in a staph infection while in the hospital and subsequently an extended hospital stay.  Not been to a doctor in about 10 years since then.  States however, recently some numbness and tingling in both legs but the R>L.  Pain in the lower back and then radiating up to the thoracic area and causing some \"achying ribs\".  When sitting too long then will get a \"surge\" of back pain.  Pain however, also present when standing for too long, walking, or sitting too long.  Pain will depend on the activity with the worse pain 10/10.  Pain worse with change in weather, cold weather, raining, etc.  Pain in the upper back and neck, but mostly between the shoulder blades.  No numbness or tingling in general down the arms, but occasionally with certain movements will have that.  Feels like there is one specific area between the shoulder blades that is significantly worse.  No change in bowel or urinary habits.      Feels like mood is very low.  Partly due to pain and it being uncontrolled.  However, states previously on prozac but had to stop with pregnancy and feels like that was helping.  Not wanting to get out and do things.  PHQ9 reviewed with score of 23.  No thoughts of harming herself today but admits to in the past.  States no plan and would not harm herself due to her " children.  Currently in care coordination and getting some therapy.  Hx of anxiety and currently working through that in therapy        Review of Systems- pertinent positive in bold:  Constitutional: Fever, chills, night sweats, fainting, weight change, fatigue, seizures, dizziness, sleeping difficulties, loud snoring/pauses in breathing  Eyes: change in vision, blurred or double vision, redness/eye pain  Ears, nose, mouth, throat: change in hearing, ear pain, hoarseness, difficulty swallowing, sores in the mouth or throat  Respiratory: shortness of breath, cough, bloody sputum, wheezing  Cardiovascular: chest pain, palpitations   Gastrointestinal: abdominal pain, heartburn/indigestion, nausea/vomiting, change in appetite, change in bowel habits, constipation or diarrhea, rectal bleeding/dark stools, difficulty swallowing  Urinary: painful urination, frequent urination, urinary urgency/incontinence, blood in urine/dark urine, nocturia  Musculoskeletal: backache/back pain (new or increasing), weakness, joint pain/stiffness (new or increasing), muscle cramps, swelling of hands, feet, ankles, leg pain/redness  Skin: change in moles/freckles, rash, nodules  Hematologic/lymphatic: swollen lymph glands, abnormal bruising/bleeding  Endocrine: excessive thirst/urination, cold or heat intolerance  Neurologic/emotional: worrisome memory change, numbness/tingling, anxiety, mood swings      Current Scheduled Meds:  Outpatient Encounter Prescriptions as of 10/31/2018   Medication Sig Dispense Refill     FLUoxetine (PROZAC) 20 MG capsule Take 1 capsule (20 mg total) by mouth daily. 30 capsule 2     HYDROcodone-acetaminophen (NORCO )  mg per tablet Take 1 tablet by mouth every 6 (six) hours as needed for pain. 120 tablet 0     No facility-administered encounter medications on file as of 10/31/2018.      Past Medical History:   Diagnosis Date     Arthritis      Depression      Urinary tract infection      Past Surgical  History:   Procedure Laterality Date     BACK SURGERY       Social History   Substance Use Topics     Smoking status: Current Every Day Smoker     Types: Cigarettes     Start date: 1/1/1980     Smokeless tobacco: Never Used     Alcohol use None       Objective / Physical Examination:  Vitals:    10/31/18 1235   BP: 122/86   Pulse: 87   Resp: 16   Temp: 97.5  F (36.4  C)   TempSrc: Oral   SpO2: 98%     Wt Readings from Last 3 Encounters:   No data found for Wt     BP Readings from Last 3 Encounters:   10/31/18 122/86     There is no height or weight on file to calculate BMI.     General Appearance: Alert and oriented, cooperative, affect appropriate, speech clear, in no apparent distress  Head: Normocephalic, atraumatic  Throat: Lips and mucosa moist. Teeth in poor repair, pharynx without erythema or exudate  Neck: Supple, trachea midline. No cervical adenopathy  Back: Symmetrical and tender to palpation of the lower back, pain with rotation of the neck with incomplete ROM, + spurlings, positive SLR, normal reflexes bilaterally  Lungs: Clear to auscultation bilaterally. Normal inspiratory and expiratory effort  Cardiovascular: Regular rate, normal S1, S2. No murmurs, rubs, or gallops  Abdomen: Bowel sounds active all four quadrants. Soft, non-tender. No hepatomegaly or splenomegaly. No bruits detected.   Extremities: Pulses 2+ and equal throughout. No edema. Strength equal throughout.  Integumentary: Warm and dry. Without suspicious looking lesions  Neuro: Alert and oriented, follows commands appropriately.     Orders Placed This Encounter   Procedures     MR Lumbar Spine With Without Contrast     MR Thoracic Spine With Without Contrast     MR Cervical Spine With Without Contrast     Ambulatory referral to Spine Care   Followup: No Follow-up on file. earlier if needed.    Total time spent with patient was 45 min with >50% of time spent in face-to-face counseling regarding the above plan       Benita Rivera MD

## 2021-06-21 NOTE — LETTER
Letter by Benita Rivera MD at      Author: Benita Rivera MD Service: -- Author Type: --    Filed:  Encounter Date: 10/28/2020 Status: (Other)         [facility]  10/28/20    Patient: Divina Blue  YOB: 1965  Medical Record Number: [id number]  CSN: [id number]                                                                              Non-opioid Controlled Substance Agreement    I understand that my care provider has prescribed a controlled substance to help manage my condition(s). I am taking this medicine to help me function or work. I know this is strong medicine, and that it can cause serious side effects. Controlled substances can be sedating, addicting and may cause a dependency on the drug. They can affect my ability to drive or think, and cause depression. They need to be taken exactly as prescribed. Combining controlled substances with certain medicines or chemicals (such as cocaine, sedatives and tranquilizers, sleeping pills, meth) can be dangerous or even fatal. Also, if I stop controlled substances suddenly, I may have severe withdrawal symptoms.  If not helpful, I may be asked to stop them.    The risks, benefits, and side effects of these medicine(s) were explained to me. I agree that:    1. I will take part in other treatments as advised by my care team. This may be psychiatry or counseling, physical therapy, behavioral therapy, group treatment or a referral to a pain clinic. I will reduce or stop my medicine when my care team tells me to do so.  2. I will take my medicines as prescribed. I will not change the dose or schedule unless my care team tells me to. There will be no refills if I run out early.  I may be contactedwithout warning and asked to complete a urine drug test or pill count at any time.   3. I will keep all my appointments, and understand this is part of the monitoring of controlled substances. My care team may require an office visit for  EVERY controlled substance refill. If I miss appointments or dont follow instructions, my care team may stop my medicine.  4. I will not ask other providers to prescribe controlled substances, and I will not accept controlled substances from other people. If I need another prescribed controlled substance for a new reason, I will tell my care team within 1 business day.  5. I will use one pharmacy to fill all of my controlled substance prescriptions, and it is up to me to make sure that I do not run out of my medicines on weekends or holidays. If my care team is willing to refill my controlled substance prescription without a visit, I must request refills only during office hours, refills may take up to 3 days to process, and it may take up to 5 to 7 days for my medicine to be mailed and ready at my pharmacy. Prescriptions will not be mailed anywhere except my pharmacy.    6. I am responsible for my prescriptions. If the medicine/prescription is lost or stolen, it will not be replaced. I also agree not to share controlled substance medicines with anyone.          [facility]  10/28/20  Patient:  Divina Blue  YOB: 1965  Medical Record Number: [id number]  CSN: [id number]    7. I agree to not use ANY illegal or recreational drugs. This includes marijuana, cocaine, bath salts or other drugs. I agree not to use alcohol unless my care team says I may. I agree to give urine samples whenever asked. If I dont give a urine sample, the care team may stop my medicine.    8. If I enroll in the [place] Medical Marijuana program, I will tell my care team. I will also sign an agreement to share my medical records with my care team.    9. I will bring in my list of medicines (or my medicine bottles) each time I come to the clinic.   10. I will tell my care team right away if I become pregnant or have a new medical problem treated outside of my regular clinic.  11. I understand that this medicine  can affect my thinking and judgment. It may be unsafe for me to drive, use machinery and do dangerous tasks. I will not do any of these things until I know how the medicine affects me. If my dose changes, I will wait to see how it affects me. I will contact my care team if I have concerns about medicine side effects.    I understand that if I do not follow any of the conditions above, my prescriptions or treatment may be stopped.      I agree that my provider, clinic care team, and pharmacy may work with any city, state or federal law enforcement agency that investigates the misuse, sale, or other diversion of my controlled medicine. I will allow my provider to discuss my care with or share a copy of this agreement with any other treating provider, pharmacy or emergency room where I receive care. I agree to give up (waive) any right of privacy or confidentiality with respect to these consents.   I have read this agreement and have asked questions about anything I did not understand.    ___________________________________________________________________________  Patient signature - Date/Time  -Divina Blue                                      ___________________________________________________________________________  Witness signature                                                                    ___________________________________________________________________________  Provider signature- Benita Rivera MD

## 2021-06-22 NOTE — TELEPHONE ENCOUNTER
Pt calling.  Requesting a message sent to PCP regarding a couple of her medications.      1. Trazodone 50 mg - took prescribed dose for 3 - 4 days did not help at all for her insomnia.  Doubled up on medication on her own for 4 days and she was able to sleep about 2 - 15 minutes naps per night.   Does find medication helpful to treat her insomnia.  Pt states she has heard positive results from lunesta .and is wondering if PCP would be OK with her trying that.    2. Hydrocodone-acetaminophen  mg  Pt states she saw PCP on 12/31 and stated medication was helping with her pain.  Pt states 1 - 2 days after OV she doesn't know what happened (maybe the cold weather) but pain was greatly increased.    For a few days she began taking 1-1/2 tabs to 2 tabs  q 6 hrs.  Yesterday she began taking the regular prescribed dose of hydrocodone-acetaminophen of 4 tabs daily.  Pt states she will try to stick to the 4 tabs daily.  May need to have medication refilled early towards the end of the month.    PLAN  Will send information to PCP.  Pt is aware she will not receive a response until tomorrow when PCP is in clinic regarding an Rx for lunesta.  Pt can be reached at [phone].  OK to leave a message on .  Raven Dangelo, RN, [facility] RN Triage/Med Refills        Reason for Disposition    Caller has NON-URGENT medication question about med that PCP prescribed and triager unable to answer question    Protocols used: MEDICATION QUESTION CALL-A-

## 2021-06-22 NOTE — TELEPHONE ENCOUNTER
Spoke with patient she understood about the lunesta. She stated that she has been trying taking 2 tablets of trazodone at night for about a week now and it has not helped.

## 2021-06-22 NOTE — TELEPHONE ENCOUNTER
Cannot do the lunesta because of her pain medication - they have an additive effect.  Can take 100mg (2 tablets) of the trazodone night and see if any improvement

## 2021-06-22 NOTE — TELEPHONE ENCOUNTER
Orders being requested: PT/OT evaluate and treat  Declines nursing and HHA.  Nurse reports patient knows her medications well.  Reason service is needed/diagnosis: ADL's, strengthening, iADL's, and home exercise program.  When are orders needed by: ASANANDA  Where to send Orders: Phone:  [phone]  Okay to leave detailed message?  Yes

## 2021-06-22 NOTE — PROGRESS NOTES
ASSESSMENT: Divina Blue is a 53 y.o. female with past medical history significant for anxiety, depression who presents today for new patient evaluation of 3 areas of chronic pain.  1.  Chronic neck pain with associated headaches and bilateral upper extremity paresthesias.  MRI cervical spine shows multilevel cervical spondylosis including mild spinal canal stenosis from C4-5 through C6-7 and multilevel foraminal stenosis which is up to severe on the right at C5-6 and C6-7.  The MRI shows central cord volume loss and T2 hyperintensity from C3-4 through C5-6, compatible with spondylitic myelopathy.  On exam, patient did have hyperactive reflexes with biceps and patellar reflexes bilaterally.  I was not able to elicit a Timothy sign.  Unclear why patient has a cord signal abnormality in the setting of only mild spinal canal stenosis.  Perhaps she had previously had a disc herniation that has resolved.  2.  Chronic thoracic pain with muscle cramping around the chest wall.  MRI thoracic spine shows only mild degenerative change at T11-12 with no significant spinal canal or neuroforaminal stenosis.  I suspect that this pain is more myofascial in nature.  3.  Chronic bilateral low back pain with radiation into the buttocks.  Patient has a history of a lumbar fusion from L4 through S1.  MRI lumbar spine does show some degenerative change adjacent to her fusion, at L3-4 where there is mild spinal canal stenosis and mild right foraminal stenosis.  Her pain today seems most consistent with sacroiliac joint dysfunction.  She has significant tenderness palpation of the bilateral sacroiliac joints and reproduction of her pain with SI joint provocative maneuvers x2 on the right and x1 on the left.  She does not have any lower extremity radicular pain.  She has chronic numbness in both feet which may represent peripheral neuropathy.    NDI: 74  Who 5: 3    PLAN:  A shared decision making model was used.  The patient's values  and choices were respected.  The following represents what was discussed and decided upon by the physician assistant and the patient.      1.  DIAGNOSTIC TESTS: I reviewed the MRI scans of the cervical, thoracic, and lumbar spine.  No further diagnostic tests were ordered.    2.  PHYSICAL THERAPY:  Discussed the importance of core strengthening, ROM, stretching exercises with the patient and how each of these entities is important in decreasing pain.  Explained to the patient that the purpose of physical therapy is to teach the patient a home exercise program.  These exercises need to be performed every day in order to decrease pain and prevent future occurrences of pain.   I would like the patient to begin physical therapy.  The patient's  tells me that her primary care provider is working on getting some home care services set up for the patient.  I will message the patient's primary care provider to see if she could also request for home care physical therapy.    3.  MEDICATIONS: No changes are made to the patient's medications.  She uses Norco at 4 tabs per day.  This managed by her primary care provider.    4.  INTERVENTIONS: I offered the patient bilateral sacroiliac joint injections.  Patient declined.  She would like to see how she does with physical therapy first.  If low back/buttock pain fails to improve with conservative treatment, I would recommend a bilateral sacroiliac joint injection as the next step.  -If the sacroiliac joint injections fail to provide relief, we could try bilateral L3-4 transforaminal epidural steroid injection      5.  PATIENT EDUCATION: Patient is in agreement the above plan.  All questions were answered.    6.  REFERRALS: I refer the patient to neurosurgery given her cord signal change in the cervical spine.    7.  FOLLOW-UP:   We will await recommendations from neurosurgery.  I will plan to see the patient back in the clinic to monitor her progress with her lower back  pain with physical therapy in about 4 weeks.  If she has any questions or concerns in the meantime, she should not hesitate to call.        SUBJECTIVE:  Divina Blue  Is a 53 y.o. female who presents today in consultation at the request of Dr. Rivera for new patient evaluation of 3 areas of chronic pain.  Patient is a very vague historian.    Patient complains of chronic bilateral neck pain with associated headaches and upper extremity numbness and tingling.  Pain began about 2002.  Pain is aggravated with moving her neck and arms.  The numbness and tingling in the arms is worse when she is laying down.  She denies any alleviating factors to the pain.  She states that repositioning helps to alleviate the numbness and tingling.  She states that she has had weakness in the past but cannot tell me any more details than that.  She has not had any treatments for her neck pain in the past.  Patient states that she has significant difficulty with balance.  She states that there are times that she falls for no reason.  She also states that she sometimes feels that she has difficulty with fine motor activities.    Patient also complains of centralized mid back pain.  Pain is localized between the shoulder blades.  She states that she occasionally feels a cramping discomfort that radiates across the mid back into the lateral chest wall.  Mid back pain is aggravated with walking and generally increased activity.  It is alleviated with rest.    Patient also complains of bilateral lower back and buttock pain.  She has a history of an L4 through S1 fusion in either 2002 or 2003.  She began to have back pain again years ago.  It has been getting progressively worse.  Pain spans across the low back and a broadband distribution at the lumbosacral junction.  Pain extends into the buttocks bilaterally.  She denies any pain radiating further down the legs.  Pain also radiates up toward the thoracolumbar junction.  Both sides of the  lower back are equally painful.  Patient has chronic numbness and tingling in both feet.  Back pain is aggravated with standing, walking, and rolling over in bed.  Pain is alleviated with sitting and laying down.  Patient to physical therapy 3-4 years ago.  She does not go to a chiropractor.  She tried epidural steroid injections before her surgery.  These were not helpful.  Patient feels that her bladder stream is weak, but she denies urinary incontinence.  She denies bowel incontinence.  She denies any saddle anesthesia.  She denies any recent fevers, chills, or sweats.    Patient is currently using Vicodin 4 tabs per day.  This is prescribed by her primary care clinic.    Current Outpatient Medications on File Prior to Encounter   Medication Sig Dispense Refill     busPIRone (BUSPAR) 15 MG tablet Take 1 tablet (15 mg total) by mouth 2 (two) times a day. 30 tablet 2     FLUoxetine (PROZAC) 20 MG capsule Take 1 capsule (20 mg total) by mouth daily. 30 capsule 2     HYDROcodone-acetaminophen (NORCO )  mg per tablet TAKE ONE TABLET BY MOUTH EVERY 6 HOURS AS NEEDED FOR PAIN 120 tablet 0     SUMAtriptan (IMITREX) 50 MG tablet Take 1 tablet (50 mg total) by mouth once as needed for migraine. 30 tablet 2       Allergies   Allergen Reactions     Penicillins Unknown     Tetracycline Myalgia     Ibuprofen Nausea And Vomiting       Past Medical History:   Diagnosis Date     Arthritis      Depression      Urinary tract infection           Past Surgical History:   Procedure Laterality Date     BACK SURGERY       Family history: Grandfather had bone cancer, grandmother had breast cancer.    Social History     Socioeconomic History     Marital status: Legally      Spouse name: None     Number of children: None     Years of education: None     Highest education level: None   Social Needs     Financial resource strain: None     Food insecurity - worry: None     Food insecurity - inability: None      Transportation needs - medical: None     Transportation needs - non-medical: None   Occupational History     None   Tobacco Use     Smoking status: Current Every Day Smoker     Types: Cigarettes     Start date: 1/1/1980     Smokeless tobacco: Never Used   Substance and Sexual Activity     Alcohol use: None     Drug use: None     Sexual activity: None   Other Topics Concern     None   Social History Narrative     None       ROS: Positive for hoarseness, chest pain/pressure, cough, dizziness, swelling of feet, shortness of breath, anxiety, poor sleep quality, back pain, joint pain, headache, depression/worry, color changes in hands.  Specifically negative for dysphagia, imbalance, fine motor skill difficulties, bowel/bladder dysfunction, fevers,chills, appetite changes, unexplained weight loss.   Otherwise 13 systems reviewed are negative.  Please see the patient's intake questionnaire from today for details.      OBJECTIVE:  PHYSICAL EXAMINATION:  CONSTITUTIONAL:  Vital signs as above.  Patient appears uncomfortable during the exam but is otherwise in no acute distress.  The patient is well nourished and well groomed.  PSYCHIATRIC:  The patient is awake, alert, oriented to person, place, time and answering questions appropriately with clear speech.    HEENT:  Normocephalic, atraumatic.  Sclera clear.    SKIN:  Skin over the face, bilateral upper extremities, and neck is clean, dry, intact without rashes.  LYMPH NODES:  No palpable or tender anterior/posterior cervical, submandibular, or supraclavicular lymph nodes.    MUSCLE STRENGTH: Patient is 4/5 strength left EHL, otherwise 5/5 strength for the bilateral shoulder abductors, elbow flexors/extensors, wrist extensors, finger flexors/abductors, hip flexors, knee flexors/extensors, ankle dorsi/plantar flexors, right EHL.  NEURO:  CN III-XII are grossly intact.  Patient ambulates with a nonantalgic gait.  Difficulty with tandem gait.  Able to ambulate on toes and heels  bilaterally.  Reflexes are 3+ bilateral biceps, 2+ bilateral triceps, 2+ bilateral brachioradialis, 3+ bilateral patellar, 2+ right and 1+ left Achilles.  Sensation to light touch is diminished in fingers 4 and 5 of the hands bilaterally.  Negative Tinel sign at cubital tunnel bilaterally.  Sensation light touch is intact in the bilateral lower extremities throughout.  Negative Proctor's bilaterally.  No ankle clonus.  Negative Babinski's bilaterally.  VASCULAR:  2/4 radial pulses bilaterally.  Warm upper limbs bilaterally.  Capillary refill in the upper extremities is less than 1 second.  MUSCULOSKELETAL: Diffuse tenderness palpation cervical, thoracic, and lumbar paraspinous muscles.  Significant tenderness palpation bilateral sacroiliac joints.  Lumbar flexion and extension are both mildly restricted.  Cervical range of motion is mildly restricted with lateral rotation bilaterally.  Pelvic distraction test reproduces low back/upper buttock pain bilaterally.  Negative thigh thrust bilaterally.  Positive Wilfred's test on the right reproducing upper buttock pain.  Negative Wilfred's test left.    RESULTS:    EXAM: MRI LUMBAR SPINE W/O CONTRAST, MRI CERVICAL SPINE W/O CONTRAST, MRI  THORACIC SPINE W/O CONTRAST  LOCATION: [facility]  DATE/TIME: 11/12/2018 5:30 PM     INDICATION: Low back pain, prior lumbar surgery.  COMPARISON: None.  TECHNIQUE:  CERVICAL SPINE MRI: Without IV contrast.  THORACIC SPINE MRI: Without IV contrast.  LUMBAR SPINE MRI: Without IV contrast.     FINDINGS:  CERVICAL SPINE:  Mild reversal of normal cervical lordosis. Degenerative anterolisthesis at C3-C4  and C4-C5 measures 1 mm. Craniocervical and atlantoaxial alignment is normal.     Vertebral body heights are maintained. Fatty degenerative endplate changes at  C6-C7.     Subtle linear ventral T2 hyperintensity of the spinal cord at the level of C3-C4  through C5-C6 with ventral cord volume loss is compatible with  spondylolytic  myelopathy. No visible extraspinal abnormality.     C2-C3: Normal disc height. No herniation. Bilateral facet arthropathy. No spinal  canal stenosis. No right neural foraminal stenosis. No left neural foraminal  stenosis.     C3-C4: Normal disc height. No herniation. Left greater than right uncovertebral  hypertrophy. Left greater than right facet arthropathy. No spinal canal  stenosis. No right neural foraminal stenosis. No left neural foraminal stenosis.     C4-C5: Disc height loss. Left central disc osteophyte complex. Bilateral  uncovertebral hypertrophy. No facet arthropathy. Mild spinal canal stenosis. No  right neural foraminal stenosis. No left neural foraminal stenosis.     C5-C6: Loss of normal disc height. Left central and right subarticular posterior  disc osteophyte complexes. Right greater than left uncovertebral hypertrophy. No  facet arthropathy. Mild spinal canal stenosis. Severe right neural foraminal  stenosis. Mild left neural foraminal stenosis.     C6-C7: Loss of normal disc height. Broad posterior disc osteophyte complex.  Right greater than left uncovertebral hypertrophy. No facet arthropathy. Mild  spinal canal stenosis. Severe right neural foraminal stenosis. Mild to moderate  left neural foraminal stenosis.     C7-T1: Normal disc height. No herniation. No facet arthropathy. No spinal canal  stenosis. No right neural foraminal stenosis. No left neural foraminal stenosis.     THORACIC SPINE  Normal vertebral body heights and alignment. Scattered intraosseous hemangiomas.  Otherwise marrow signal pattern is.     At T11-T12, Disc desiccation and height loss with left subarticular small disc  protrusion and bilateral facet arthropathy. No additional level of significant  degenerative disc disease or facet arthropathy. No spinal canal or neural  foraminal stenosis. No abnormal cord signal.     No visible extraspinal abnormality.     LUMBAR SPINE:  Nomenclature is based on 5 lumbar  type vertebral bodies.     L4-S1 posterior fusion hardware is in place. Partial interbody osseous fusion at  L4-L5 and near-complete interbody osseous fusion at L5-S1.     Normal lumbar alignment. No listhesis.     Old compression deformity of the left lateral L1 superior endplate with  approximately 20% height loss centrally; no retropulsion. Remaining vertebral  bodies are normal in height. Fatty degenerative endplate changes at the anterior  superior L3 endplate. Edematous marrow at the right posterolateral inferior L3  endplate, most likely degenerative in etiology. No pathologic marrow signal. No  pars defect.     The conus tip is identified at L2. No acute retroperitoneal or paraspinal soft  tissue abnormality. Small simple appearing cysts in the bilateral kidneys. The  visualized portions of the bony pelvis are normal for age.     INDIVIDUAL LEVELS:  T12-L1: Disc desiccation with normal disc height. Shallow diffuse disc bulge.  Ligamentum flavum thickening. Bilateral facet arthropathy. No spinal canal  stenosis. No right neural foraminal stenosis. No left neural foraminal stenosis.     L1-L2: Normal disc height and signal. No herniation. Ligamentum flavum  thickening. Bilateral facet arthropathy. No spinal canal stenosis. No right  neural foraminal stenosis. No left neural foraminal stenosis.     L2-L3: Disc desiccation and height loss. Diffuse disc bulge. Ligamentum flavum  thickening. Bilateral facet arthropathy. No spinal canal stenosis. No right  neural foraminal stenosis. No left neural foraminal stenosis.     L3-L4: Disc desiccation and posterior greater than anterior disc height loss.  Diffuse disc bulge. Ligamentum flavum thickening. Bilateral facet arthropathy.  Mild spinal canal stenosis. Mild right neural foraminal stenosis. No left neural  foraminal stenosis.     L4-L5: Interbody fusion. No disc bulge or herniation. No facet arthropathy. No  spinal canal stenosis. No right neural foraminal  stenosis. No left neural  foraminal stenosis.     L5-S1: Interbody fusion. No disc bulge or herniation. No facet arthropathy. No  spinal canal stenosis. No right neural foraminal stenosis. No left neural  foraminal stenosis.     CONCLUSION:  CERVICAL SPINE MRI:  1.  Multilevel cervical spondylosis. Mild spinal canal stenosis at C4-C5, C5-C6  and C6-C7.  2.  Multilevel neural foraminal narrowing, up to severe on the right at C5-C6  and C6-C7.  3.  Ventral cord volume loss and linear T2 hyperintensity at the levels of C3-C4  through C5-C6, compatible with spondylolytic myelopathy.     THORACIC SPINE MRI:  1.  Mild degenerative changes at T11-T12. No significant spinal canal or neural  foraminal stenosis.     LUMBAR SPINE MRI:  1.  L4-S1 posterior fusion hardware.  2.  Multilevel lumbar spondylosis. Up to mild spinal canal stenosis at L3-L4. Up  to mild neural foraminal narrowing on the right at L3-L4. No additional level of  significant spinal canal or neural foraminal stenosis.  3.  Old compression deformity of the L1 superior endplate. No retropulsion.

## 2021-06-22 NOTE — PROGRESS NOTES
Family Medicine Office Visit  [facility]  Patient Name: Divina Blue  Patient Age: 53 y.o.  YOB: 1965  MRN: [id number]    Date of Visit: 2018  Reason for Office Visit:   Chief Complaint   Patient presents with     Referral     to pulmonology      Anxiety     medication     Depression     medication           Assessment / Plan / Medical Decision Makin. Chronic radicular pain of lower back  Current treatment plan:  10 mg of norco, 4 times per day (4 pills per day)  Reviewed goals of care.  Benefits continue to outweigh the risks of continued use of opioid analgesia.  Prescription monitoring database has been reviewed and shows no aberrant use.  PEG Pain Screening Tool completed and reviewed and patient continues to demonstrate functional improvement as a result of opioid analgesia.  Reviewed side effects from current therapy.  Plan to follow-up in 1 months  Number of refills allowed prior to follow-up visit: 0    Continue with PT.  Patient is to call if need any further services.  She believes she has home health already scheduled to come in and start with PT at home.  2. Chronic neck pain  Follow up with neurosurgery as indicated  Pt is in need of a bed to help with the alleviation of pain and positioning of the body.  Pt is requiring frequent changes in body positioning due to the chronic neck and back pain.  For this I recommend a fixed height bed.    3. Severe episode of recurrent major depressive disorder, without psychotic features (H)  Doing well on the fluoxetine and doing therapy with counselor at home.  Will increase the prozac and see if stephan improvement.  Added vistaril as needed for anxiety    4. DONNY (generalized anxiety disorder)  Continue with the prozac    5. Insomnia, unspecified type  Start trazodone and see if any improvement    6. Cigarette nicotine dependence without complication  Discussed smoking cessation and pt did not want  any further intervention at this time.  Referral to pulm  - Ambulatory referral to Pulmonology    7. SOB (shortness of breath)  Started on inahlers to try and help with symptoms  - Ambulatory referral to Pulmonology        Health Maintenance Review  Health Maintenance   Topic Date Due     MAMMOGRAM  1965     ADVANCE DIRECTIVES DISCUSSED WITH PATIENT  11/30/1982     PAP SMEAR  11/30/1994     COLONOSCOPY  11/30/2014     INFLUENZA VACCINE RULE BASED (1) 08/01/2018     DEPRESSION FOLLOW UP  04/13/2019     TD 18+ HE  03/06/2022     TDAP ADULT ONE TIME DOSE  Completed         I have discontinued Divina Blue's busPIRone and fluticasone-salmeterol. I have also changed her FLUoxetine and HYDROcodone-acetaminophen. Additionally, I am having her start on traZODone, hydrOXYzine pamoate, albuterol, and mometasone-formoterol. Lastly, I am having her maintain her SUMAtriptan.      HPI:  Divina Blue is a 53 y.o. year old who presents to the office today for multiple complaints.  Still following up on depression.  Doing well on the fluoxetine but feels like it isn't working well enough.  Still feel like mood is low and having issues with anxiety.  Did not get a refill for the buspar but didn't notice any difference when stopped the medication.  States having panic attacks at times and frustrated when told she needed to come in for a visit -states unable to leave the house when having an attack never mind to come into the office.  Not sleeping well - states this is a recurrent issue.  Difficulty falling and staying asleep but never been on any medication.  Usually falling asleep for about 15 min at a time and usually  Not until about 4 am.  Then frustrated as she feels she is unable to do things throughout the day.       Seen by the spine center and offered injections for the lower back and did not want to start that - planning on PT at home.  Believes she is having PT come to the house starting next week but unsure.   "currently working with care worker who she will ask to ensure.  Scheduled to see neurosurgery for the neck issues due to changes in MRI with spinal stenosis.  Taking the norco 4x/day now which she was not previously and feels like that is helping significantly.  Not currently sleeping in a bed - does not have one.  She is sleeping on a sofa which is worsening her neck and back pain.     Smoking about 2lb of tobacco/month which she thinks equates to about 1 carton/week.  No interest in quitting at this time.  Would like to see pulmonology.  Previously told had emphysema and grandmother who had it and would like \"full exam by pulmonologist\".  Shortness of breath with activity and wheezing when going to bed.  Never been on any inhalers.  Coughing but not bringing up anything.        Review of Systems- pertinent positive in bold:  Constitutional: Fever, chills, night sweats, fainting, weight change, fatigue, seizures, dizziness, sleeping difficulties, loud snoring/pauses in breathing  Eyes: change in vision, blurred or double vision, redness/eye pain  Ears, nose, mouth, throat: change in hearing, ear pain, hoarseness, difficulty swallowing, sores in the mouth or throat  Respiratory: shortness of breath, cough, bloody sputum, wheezing  Cardiovascular: chest pain, palpitations   Gastrointestinal: abdominal pain, heartburn/indigestion, nausea/vomiting, change in appetite, change in bowel habits, constipation or diarrhea, rectal bleeding/dark stools, difficulty swallowing  Urinary: painful urination, frequent urination, urinary urgency/incontinence, blood in urine/dark urine, nocturia  Musculoskeletal: backache/back pain (new or increasing), weakness, joint pain/stiffness (new or increasing), muscle cramps, swelling of hands, feet, ankles, leg pain/redness  Skin: change in moles/freckles, rash, nodules  Hematologic/lymphatic: swollen lymph glands, abnormal bruising/bleeding  Endocrine: excessive thirst/urination, cold or " heat intolerance  Neurologic/emotional: worrisome memory change, numbness/tingling, anxiety, mood swings      Current Scheduled Meds:  Outpatient Encounter Medications as of 12/31/2018   Medication Sig Dispense Refill     FLUoxetine (PROZAC) 40 MG capsule Take 1 capsule (40 mg total) by mouth daily. 90 capsule 1     HYDROcodone-acetaminophen (NORCO )  mg per tablet Take 1 tablet by mouth every 6 (six) hours as needed for pain. 120 tablet 0     SUMAtriptan (IMITREX) 50 MG tablet Take 1 tablet (50 mg total) by mouth once as needed for migraine. 30 tablet 2     [DISCONTINUED] busPIRone (BUSPAR) 15 MG tablet Take 1 tablet (15 mg total) by mouth 2 (two) times a day. 30 tablet 2     [DISCONTINUED] FLUoxetine (PROZAC) 20 MG capsule Take 1 capsule (20 mg total) by mouth daily. 30 capsule 2     [DISCONTINUED] HYDROcodone-acetaminophen (NORCO )  mg per tablet TAKE ONE TABLET BY MOUTH EVERY 6 HOURS AS NEEDED FOR PAIN 120 tablet 0     albuterol (PROAIR HFA;PROVENTIL HFA;VENTOLIN HFA) 90 mcg/actuation inhaler Inhale 2 puffs every 6 (six) hours as needed for wheezing. 1 each 0     hydrOXYzine pamoate (VISTARIL) 50 MG capsule Take 1 capsule (50 mg total) by mouth 3 (three) times a day as needed for itching. 90 capsule 0     mometasone-formoterol (DULERA) 100-5 mcg/actuation HFAA inhaler Inhale 2 puffs 2 (two) times a day. 1 Inhaler 3     traZODone (DESYREL) 50 MG tablet Take 1 tablet (50 mg total) by mouth at bedtime. 30 tablet 1     [DISCONTINUED] fluticasone-salmeterol (ADVAIR DISKUS) 100-50 mcg/dose DISKUS Inhale 1 puff 2 (two) times a day. 1 each 2     No facility-administered encounter medications on file as of 12/31/2018.      Past Medical History:   Diagnosis Date     Arthritis      Depression      Urinary tract infection      Past Surgical History:   Procedure Laterality Date     BACK SURGERY       Social History     Tobacco Use     Smoking status: Current Every Day Smoker     Types: Cigarettes      Start date: 1/1/1980     Smokeless tobacco: Never Used   Substance Use Topics     Alcohol use: Not on file     Drug use: Not on file       Objective / Physical Examination:  Vitals:    12/31/18 0902   BP: 112/64   Patient Site: Right Arm   Patient Position: Sitting   Cuff Size: Adult Regular   Pulse: 78   SpO2: 95%   Weight: 195 lb 9.6 oz (88.7 kg)   Height: 5' (1.524 m)     Wt Readings from Last 3 Encounters:   12/31/18 195 lb 9.6 oz (88.7 kg)   12/14/18 202 lb (91.6 kg)   11/15/18 211 lb (95.7 kg)     BP Readings from Last 3 Encounters:   12/31/18 112/64   12/14/18 135/81   11/15/18 120/84     Body mass index is 38.2 kg/m .     General Appearance: Alert and oriented, cooperative, affect appropriate, speech clear, in no apparent distress  Head: Normocephalic, atraumatic  Ears: Tympanic membrane clear with landmarks well visualized bilaterally  Eyes: PERRL, fundi appear clear bilaterally. EOMI. Conjunctivae clear and sclerae non-icteric  Nose: Septum midline, nares patent, no visible polyps, mucosa moist and without drainage  Throat: Lips and mucosa moist. Teeth in good repair, pharynx without erythema or exudate  Back: Symmetrical and nontender  Lungs: Clear to auscultation bilaterally. Normal inspiratory and expiratory effort  Cardiovascular: Regular rate, normal S1, S2. No murmurs, rubs, or gallops  Abdomen: Bowel sounds active all four quadrants. Soft, non-tender. No hepatomegaly or splenomegaly. No bruits detected.   Extremities: Pulses 2+ and equal throughout. No edema. Strength equal throughout.  Integumentary: Warm and dry. Without suspicious looking lesions  Neuro: Alert and oriented, follows commands appropriately.     Orders Placed This Encounter   Procedures     Ambulatory referral to Pulmonology   Followup: Return in about 4 weeks (around 1/28/2019). earlier if needed.    Total time spent with patient was 40 min with >50% of time spent in face-to-face counseling regarding the above plan       Benita  MD Miguel

## 2021-06-22 NOTE — TELEPHONE ENCOUNTER
Who is calling:  Radha  with [facility]   Reason for Call:  She states Dr Rivera asked to speak with her regarding this patient. She will be available tomorrow morning between 8:30 AM and 10:30 AM. She can be reached at [phone].  Date of last appointment with primary care: 12/31/2018  Has the patient been recently seen:  Yes  Okay to leave a detailed message: Yes

## 2021-06-23 NOTE — TELEPHONE ENCOUNTER
Called and LVM for pt that the Rx is at the pharmacy - this replaces the trazodone.  Informed that she should call if she has questions or discuss with the pharmacist

## 2021-06-23 NOTE — PROGRESS NOTES
Neurosurgery consultation was requested by: JANAK Quiros  Pain: Neck pain   Radicular Pain is present: Pain in both arms   Lhermitte sign:  NO   Motor complaints: Weakness in right arm and hand  Sensory complaints: Numbness in both hands from hlf the 3rd finger and then the 4 and 5th fingers on both hands and up the arms   Gait and balance issues: Yes  Bowel or bladder issues: Denies   Duration of SX is:May years  The symptoms are worse with: Not sure   The symptoms are better with: Not moving   Injury: Thrown from a horse   Severity is:Chronic  Patient has tried the following conservative measures:Has not had conservative treatment for a few years.   NDI score is :   MARCIANO Franklin

## 2021-06-23 NOTE — TELEPHONE ENCOUNTER
Spoke with patient, gave her provider advice, she is okay with a referral, she is wondering what she can do in the mean time for sleeping, she is wondering if she can take Trazodone in evening and at bedtime to space it out, or what other suggestions with Trazodone you might have?

## 2021-06-23 NOTE — PROGRESS NOTES
Family Medicine Office Visit  [facility]  Patient Name: Divina Blue  Patient Age: 53 y.o.  YOB: 1965  MRN: [id number]    Date of Visit: 2019  Reason for Office Visit:   Chief Complaint   Patient presents with     Follow-up     Medication Check. Consult with spine.            Assessment / Plan / Medical Decision Makin. Chronic radicular pain of lower back  Refill medication and continue to monitor.  - Drug Abuse 1+, Urine    2. Cigarette nicotine dependence without complication  Discussed smoking cessation    3. DONNY (generalized anxiety disorder)  Continue with current medication and therapy - stable    4. Chronic neck pain  Follow up with neurosurgery after additional testing and will determine the plan    5. Severe episode of recurrent major depressive disorder, without psychotic features (H)  Continue with current medication - stable     6. Insomnia, unspecified type  Continue with the seroquel  - improving sleep        Health Maintenance Review  Health Maintenance   Topic Date Due     MAMMOGRAM  1965     ADVANCE DIRECTIVES DISCUSSED WITH PATIENT  1982     PAP SMEAR  1994     COLONOSCOPY  2014     INFLUENZA VACCINE RULE BASED (1) 2018     DEPRESSION FOLLOW UP  2019     TD 18+ HE  2022     TDAP ADULT ONE TIME DOSE  Completed         I have changed Divina Blue's hydrOXYzine pamoate and QUEtiapine. I am also having her maintain her SUMAtriptan, FLUoxetine, mometasone-formoterol, albuterol, and HYDROcodone-acetaminophen.      HPI:  Divina Blue is a 53 y.o. year old who presents to the office today for follow up on neck and back pain.  Seen by neurosurgery and want to distinguish between peripheral neuropathy and due to the neck.  EMG ordered and repeat XR and then plan to follow up.  Pain well controlled with the medication.  Taking norco as directed.  Current treatment plan:  10 mg of norco, 4 times  per day (4 pills per day)  Reviewed goals of care.  Benefits continue to outweigh the risks of continued use of opioid analgesia.  Prescription monitoring database has been reviewed and shows no aberrant use.  PEG Pain Screening Tool completed and reviewed and patient continues to demonstrate functional improvement as a result of opioid analgesia.  Reviewed side effects from current therapy.  Plan to follow-up in 1 months  Number of refills allowed prior to follow-up visit: 1    Trying to quit smoking.  Trying to find more activities that she likes to do.  Previously enjoyed crafting and trying to take time everyday to do those things.  Sleeping slowly improving.  On seroquel and taking 1 tablet at bedtime and able to sleep between 1-4 hours at a time and then waking up.  But when waking up able to fall back asleep for a period of time unlike previously.  Feels like mood is improving.      Pt trying to get electric bed.  Pt currently doesn't have a bed.  She requires frequent changes in positioning due to her ongoing neck and back pain.  She does not need help transfering to chair, wheelchair or standing position.  Pt does not require traction.  Pt does require the head of the bed to elevated due to ongoing shortness of breath.  Patient has chronic pain that requires constant adjustments and positioning to ease the pain from chronic radicular pain of the lower back and chronic neck pain.    Review of Systems- pertinent positive in bold:  Constitutional: Fever, chills, night sweats, fainting, weight change, fatigue, seizures, dizziness, sleeping difficulties, loud snoring/pauses in breathing  Eyes: change in vision, blurred or double vision, redness/eye pain  Ears, nose, mouth, throat: change in hearing, ear pain, hoarseness, difficulty swallowing, sores in the mouth or throat  Respiratory: shortness of breath, cough, bloody sputum, wheezing  Cardiovascular: chest pain, palpitations   Gastrointestinal: abdominal  pain, heartburn/indigestion, nausea/vomiting, change in appetite, change in bowel habits, constipation or diarrhea, rectal bleeding/dark stools, difficulty swallowing  Urinary: painful urination, frequent urination, urinary urgency/incontinence, blood in urine/dark urine, nocturia  Musculoskeletal: backache/back pain (new or increasing), weakness, joint pain/stiffness (new or increasing), muscle cramps, swelling of hands, feet, ankles, leg pain/redness  Skin: change in moles/freckles, rash, nodules  Hematologic/lymphatic: swollen lymph glands, abnormal bruising/bleeding  Endocrine: excessive thirst/urination, cold or heat intolerance  Neurologic/emotional: worrisome memory change, numbness/tingling, anxiety, mood swings      Current Scheduled Meds:  Outpatient Encounter Medications as of 1/31/2019   Medication Sig Dispense Refill     albuterol (PROAIR HFA;PROVENTIL HFA;VENTOLIN HFA) 90 mcg/actuation inhaler Inhale 2 puffs every 6 (six) hours as needed for wheezing. 1 each 11     FLUoxetine (PROZAC) 40 MG capsule Take 1 capsule (40 mg total) by mouth daily. 90 capsule 1     HYDROcodone-acetaminophen (NORCO )  mg per tablet Take 1 tablet by mouth every 6 (six) hours as needed for pain. 120 tablet 0     hydrOXYzine pamoate (VISTARIL) 50 MG capsule Take 1 capsule (50 mg total) by mouth 3 (three) times a day as needed for anxiety. 90 capsule 0     mometasone-formoterol (DULERA) 100-5 mcg/actuation HFAA inhaler Inhale 2 puffs 2 (two) times a day. 1 Inhaler 3     QUEtiapine (SEROQUEL) 50 MG tablet Take 1 tablet (50 mg total) by mouth at bedtime. 30 tablet 3     SUMAtriptan (IMITREX) 50 MG tablet Take 1 tablet (50 mg total) by mouth once as needed for migraine. 30 tablet 2     [DISCONTINUED] albuterol (PROAIR HFA;PROVENTIL HFA;VENTOLIN HFA) 90 mcg/actuation inhaler Inhale 2 puffs every 6 (six) hours as needed for wheezing. 1 each 0     [DISCONTINUED] HYDROcodone-acetaminophen (NORCO )  mg per tablet  Take 1 tablet by mouth every 6 (six) hours as needed for pain. 120 tablet 0     [DISCONTINUED] hydrOXYzine pamoate (VISTARIL) 50 MG capsule Take 1 capsule (50 mg total) by mouth 3 (three) times a day as needed for itching. 90 capsule 0     [DISCONTINUED] QUEtiapine (SEROQUEL) 50 MG tablet Take 0.5 tablets (25 mg total) by mouth at bedtime. 30 tablet 0     No facility-administered encounter medications on file as of 2019.      Past Medical History:   Diagnosis Date     Arthritis      Depression      Past Surgical History:   Procedure Laterality Date     BACK SURGERY        SECTION      x 4     TONSILLECTOMY       Social History     Tobacco Use     Smoking status: Current Every Day Smoker     Packs/day: 1.00     Years: 33.00     Pack years: 33.00     Types: Cigarettes     Start date: 1980     Smokeless tobacco: Never Used     Tobacco comment: Rolls her own cigarettes- goes through ~2lbs per month   Substance Use Topics     Alcohol use: No     Frequency: Never     Drug use: No       Objective / Physical Examination:  Vitals:    19 1047   BP: 130/80   Pulse: 78   SpO2: 95%   Weight: 195 lb (88.5 kg)   Height: 5' (1.524 m)     Wt Readings from Last 3 Encounters:   19 195 lb (88.5 kg)   19 195 lb (88.5 kg)   18 195 lb 9.6 oz (88.7 kg)     BP Readings from Last 3 Encounters:   19 130/80   19 116/73   19 132/82     Body mass index is 38.08 kg/m .   Results for orders placed or performed in visit on 19   Drug Abuse 1+, Urine   Result Value Ref Range    Amphetamines Screen Negative Screen Negative    Benzodiazepines Screen Negative Screen Negative    Opiates (!) Screen Negative     Screen Positive (Confirmation available on request)    Phencyclidine Screen Negative Screen Negative    THC Screen Negative Screen Negative    Barbiturates Screen Negative Screen Negative    Cocaine Metabolite Screen Negative Screen Negative    Methadone Screen Negative Screen  Negative    Oxycodone Screen Negative Screen Negative    Creatinine, Urine 67.0 mg/dL           General Appearance: Alert and oriented, cooperative, affect appropriate, speech clear, in no apparent distress  Head: Normocephalic, atraumatic  Back: Symmetrical and nontender  Lungs: Clear to auscultation bilaterally. Normal inspiratory and expiratory effort  Cardiovascular: Regular rate, normal S1, S2. No murmurs, rubs, or gallops  Abdomen: Bowel sounds active all four quadrants. Soft, non-tender. No hepatomegaly or splenomegaly. No bruits detected.   Extremities: Pulses 2+ and equal throughout. No edema. Strength equal throughout.  Integumentary: Warm and dry. Without suspicious looking lesions  Neuro: Alert and oriented, follows commands appropriately.     Orders Placed This Encounter   Procedures     Drug Abuse 1+, Urine   Followup: No Follow-up on file. earlier if needed.    Total time spent with patient was 30 min with >50% of time spent in face-to-face counseling regarding the above plan       Benita Rivera MD

## 2021-06-23 NOTE — PROGRESS NOTES
NEUROSURGERY CONSULTATION NOTE    2019       CHIEF COMPLAINT: Neck pain, back pain    HPI:    Divina Blue is a 53 y.o. female who is sent to us in consultation by Andie Kilgore for further evaluation of lumbar spondylosis and cervical spondylosis.    Right now she notes pain in her neck and low back. She feels her low back bothers her slightly more than her neck. With regard to her neck, she gets pain that goes down the arms into the lateral three fingers in both hands, intermittent tingling but always has sensory disturbance.   She notes problems with balance, stumbles and trips around the house. A few times will lose her balance just with standing. Drops objects frequently. Perceives weakness in her arms, right worse than left- been an issue since May 2018 at which time she started getting worsening right shoulder pain that felt deep and started spreading further down the arm posteriorly. It gradually improved but still has weakness and notes some residual symptoms.  Intermittent falls- last bad fall was a year ago. She states that her arms go numb and dead when she lays on them while side sleeping, she notes that it happens immediately upon laying down.     Aggravating factors: Lifting, bending, prolonged sitting or standing- notes she spends most of her day in bed. Cannot walk far. Cold is aggravating. In the neck, flex/extension, neck rotation. Laying flat.    Relieving: Ocean water, hydrocodone helps take the edge off (QID)    Pain management: Physical therapy, has tried injections in the past, medications currently taking hydrocodone    Past Medical History:   Diagnosis Date     Arthritis      Depression      Past Surgical History:   Procedure Laterality Date     BACK SURGERY        SECTION      x 4     TONSILLECTOMY         REVIEW OF SYSTEMS:  Pt denies any significant improvement in her low back symptoms after her fusion surgery. She notes complication with staph infection after surgery  and had to have a wound vac. Notes numbness in the great toe on the right foot and she has similar symptoms in the left foot. Has numbness tingling in both feet, heels. Denies radiating pain in the buttocks or down the legs. Gets intermittent swelling in her legs. A full 14 point review of systems was otherwise completed and is negative aside from that mentioned above in the HPI    MEDICATIONS:    Current Outpatient Medications   Medication Sig Dispense Refill     albuterol (PROAIR HFA;PROVENTIL HFA;VENTOLIN HFA) 90 mcg/actuation inhaler Inhale 2 puffs every 6 (six) hours as needed for wheezing. 1 each 0     FLUoxetine (PROZAC) 40 MG capsule Take 1 capsule (40 mg total) by mouth daily. 90 capsule 1     HYDROcodone-acetaminophen (NORCO )  mg per tablet Take 1 tablet by mouth every 6 (six) hours as needed for pain. 120 tablet 0     hydrOXYzine pamoate (VISTARIL) 50 MG capsule Take 1 capsule (50 mg total) by mouth 3 (three) times a day as needed for itching. 90 capsule 0     mometasone-formoterol (DULERA) 100-5 mcg/actuation HFAA inhaler Inhale 2 puffs 2 (two) times a day. 1 Inhaler 3     QUEtiapine (SEROQUEL) 50 MG tablet Take 0.5 tablets (25 mg total) by mouth at bedtime. 30 tablet 0     SUMAtriptan (IMITREX) 50 MG tablet Take 1 tablet (50 mg total) by mouth once as needed for migraine. 30 tablet 2     No current facility-administered medications for this visit.          ALLERGIES/SENSITIVITIES:     Allergies   Allergen Reactions     Penicillins Unknown     Tetracycline Myalgia     Ibuprofen Nausea And Vomiting       PERTINENT SOCIAL HISTORY:   Social History     Socioeconomic History     Marital status: Legally      Spouse name: None     Number of children: None     Years of education: None     Highest education level: None   Social Needs     Financial resource strain: None     Food insecurity - worry: None     Food insecurity - inability: None     Transportation needs - medical: None      Transportation needs - non-medical: None   Occupational History     None   Tobacco Use     Smoking status: Current Every Day Smoker     Packs/day: 1.00     Years: 33.00     Pack years: 33.00     Types: Cigarettes     Start date: 1/1/1980     Smokeless tobacco: Never Used     Tobacco comment: Rolls her own cigarettes- goes through ~2lbs per month   Substance and Sexual Activity     Alcohol use: No     Frequency: Never     Drug use: No     Sexual activity: None   Other Topics Concern     None   Social History Narrative     None         FAMILY HISTORY:  Family History   Problem Relation Age of Onset     Emphysema Maternal Grandmother      Breast cancer Maternal Grandmother         PHYSICAL EXAM:     Constitution: /73   Pulse (!) 104   Ht 5' (1.524 m)   Wt 195 lb (88.5 kg)   SpO2 95%   BMI 38.08 kg/m  .   Awake, alert and in NAD  Eyes: Conjugate gaze. Conjunctiva benign without icterus or injection  Heart: RRR  Lungs: Non-labored respiration without accessory muscle use  Skin: No obvious rash or lesion  Psych: Appropriate mood and affect, alert and oriented x 3  Mental Status:  Speech is fluent.  Recent and remote memory are intact.  Attention span and concentration are normal.     Cranial Nerves:  CN2: No funduscopic exam performed. CN3,4 & 6: Pupillary light response, lateral and vertical gaze normal.  No nystagmus. CN7: No facial weakness, smile, facial symmetry intact. CN8: Intact to spoken voice.      Motor: No pronator drift of upper extremity. Normal bulk and tone all muscle groups of upper and lower extremities. Strength is 5/5 in all muscle groups of the bilateral upper and lower extremities      Sensory: Sensation intact bilaterally to light touch and temperature throughout. Vibratory sense is intact in the bl great toe.     Coordination:  Gait is mildly antalgic, walks with wide based stance. Pt is able to heel and toe walk without difficulty. Tandem gait is characterized by mild to moderate  incoordination. HEATHER in the UE demonstrates some mild slowing bilaterally, symmetric.     Reflexes; 3+ supinator, biceps, triceps. 3+ patellar and absent achilles bl. No marinelli. No clonus. Toes are down-going bilaterally    Musculoskeletal: Negative straight leg raise bilaterally. Negative tinel sign at the bilateral carpal and cubital tunnels    IMAGING: I personally reviewed all radiographic images    MRI cervical spine 11/12/2018: There is multilevel cervical spondylosis with cervical kyphosis that is centered around C5.  At C4-5, there is a broad-based disc osteophyte complex which abuts and indents the ventral spinal cord.  There is mild right foraminal stenosis.  At C5-6, there again is a broad-based paracentral disc osteophyte complex which abuts and indents the ventral spinal cord resulting in severe right, mild left foraminal stenosis.  At C6-7, there is a broad-based disc osteophyte complex which narrows the ventral spinal canal space and results in mild central canal stenosis with associated severe right, mild to moderate left foraminal stenosis.  At C7-T1, there are mild degenerative changes without significant central canal or foraminal stenosis.  There appear to be ventral T2 signal changes in the spinal cord at the level of C6-7 C5-6 C4-5; it is difficult to tell if this is artifact or true cord signal change.     Cervical spine flexion extension x-rays 1/29/2018: 4 degrees of cervical kyphosis.  Multilevel cervical spondylosis with advanced disc degeneration most prominent at C5-6, C6-7.  There is mild anterolisthesis of C3 on C4 measuring 1.5 mm which reduces in extension.  There is no other evidence of spondylolisthesis, no significant dynamic instability.  In extension C6-7, C7-T1 is incompletely visualized.  In neutral imaging C7-T1 is incompletely visualized.      CONSULTATION ASSESSMENT AND PLAN:    Divina Blue is a 53 y.o. female who has a history of a prior L4-S1 TLIF with posterior  instrumentation, complicated by staph infection who has evidence of some adjacent segment disease with chronic low back pain.  She also has neck pain, symptoms of numbness and tingling in the bilateral arms with evidence of cervical kyphosis, multilevel cervical spondylosis, cervical spinal stenosis with symptoms concerning for possible cervical radiculopathy versus peripheral neuropathy.    I reviewed Ms. Blue's cervical and lumbar spine MRIs.  I would like to obtain an EMG of the upper extremities to help differentiate peripheral neuropathy versus cervical radiculopathy.  She does have some possible cord signal change on her MRI and this is concerning in light of her symptoms of imbalance.  On examination her clinical findings of myelopathy are mild, and I noted that with her kyphosis, she would be limited to a cervical fusion surgery for treatment of her cord compression if that was necessary.  I noted that given her smoking history, she would need to be smoke-free unless we saw that she was having significant worsening of her symptoms.  She understands that it is time for her to quit smoking she is going to consider transitioning to an e-cigarette or using the patch.    In light of her prior lumbar fusions, and evidence of adjacent segment disease with lumbar spondylosis at L3-4, I recommended lumbar spine flexion extension x-rays for further evaluation to ensure no underlying spondylolisthesis that is otherwise going unrecognized.    I spent more than 90 minutes in this apt, examining the pt, reviewing the scans, reviewing notes from chart, discussing treatment options with risks and benefits and coordinating care. >50 % clinic time was spent in face to face counseling and coordinating care    Cassi Castillo MD      Cc:   Benita Rivera MD

## 2021-06-23 NOTE — TELEPHONE ENCOUNTER
Can send her to pain management and see if they have any suggestions for the pain medication in addition to the insomnia medication.  Will place a referral if ok

## 2021-06-24 ENCOUNTER — COMMUNICATION - HEALTHEAST (OUTPATIENT)
Dept: FAMILY MEDICINE | Facility: CLINIC | Age: 56
End: 2021-06-24

## 2021-06-24 NOTE — PROGRESS NOTES
Family Medicine Office Visit  [facility]  Patient Name: Divina Blue  Patient Age: 53 y.o.  YOB: 1965  MRN: [id number]    Date of Visit: 3/14/2019  Reason for Office Visit:   Chief Complaint   Patient presents with     Depression     Depression f/u, from medication changes she is still having problems with both anxiety and depression.           Assessment / Plan / Medical Decision Makin. Chronic radicular pain of lower back  Follow up with neurosurgery as directed.  Filled out paperwork for [facility]    2. Chronic neck pain  Continue with pain medications.  Reviewed     3. Severe episode of recurrent major depressive disorder, without psychotic features (H)  Stop the 40mg of prozac.  Take 20mg for the next week and then trial of lexapro    4. DONNY (generalized anxiety disorder)  Trial of remeron to help with sleep as seroquel and trazodone did not work    5. Cigarette nicotine dependence without complication  Encouraged smoking cessation        Health Maintenance Review  Health Maintenance   Topic Date Due     MAMMOGRAM  1965     ADVANCE DIRECTIVES DISCUSSED WITH PATIENT  1982     PAP SMEAR  1994     COLONOSCOPY  2014     INFLUENZA VACCINE RULE BASED (1) 2018     DEPRESSION FOLLOW UP  2019     TD 18+ HE  2022     TDAP ADULT ONE TIME DOSE  Completed         I have discontinued Divina Blue's FLUoxetine and QUEtiapine. I am also having her start on mirtazapine, escitalopram oxalate, and FLUoxetine. Additionally, I am having her maintain her SUMAtriptan, mometasone-formoterol, albuterol, hydrOXYzine pamoate, and HYDROcodone-acetaminophen.      HPI:  Divina Blue is a 53 y.o. year old who presents to the office today for follow up on panic attacks.  Taking the prozac but still feel like she is having the attacks and unable to leave the house.  Not sleeping well at night.  Feels like mood is low.  Vistaril  not doing anything.  Planning on seeing neurosurgery on 4/23 for follow up on the neck pain.  Still smoking and not wanting to quit yet but understands may have to quit prior to having any surgery or procedure done by neurosurgery.      Review of Systems- pertinent positive in bold:  Constitutional: Fever, chills, night sweats, fainting, weight change, fatigue, seizures, dizziness, sleeping difficulties, loud snoring/pauses in breathing  Eyes: change in vision, blurred or double vision, redness/eye pain  Ears, nose, mouth, throat: change in hearing, ear pain, hoarseness, difficulty swallowing, sores in the mouth or throat  Respiratory: shortness of breath, cough, bloody sputum, wheezing  Cardiovascular: chest pain, palpitations   Gastrointestinal: abdominal pain, heartburn/indigestion, nausea/vomiting, change in appetite, change in bowel habits, constipation or diarrhea, rectal bleeding/dark stools, difficulty swallowing  Urinary: painful urination, frequent urination, urinary urgency/incontinence, blood in urine/dark urine, nocturia  Musculoskeletal: backache/back pain (new or increasing), weakness, joint pain/stiffness (new or increasing), muscle cramps, swelling of hands, feet, ankles, leg pain/redness  Skin: change in moles/freckles, rash, nodules  Hematologic/lymphatic: swollen lymph glands, abnormal bruising/bleeding  Endocrine: excessive thirst/urination, cold or heat intolerance  Neurologic/emotional: worrisome memory change, numbness/tingling, anxiety, mood swings      Current Scheduled Meds:  Outpatient Encounter Medications as of 3/14/2019   Medication Sig Dispense Refill     albuterol (PROAIR HFA;PROVENTIL HFA;VENTOLIN HFA) 90 mcg/actuation inhaler Inhale 2 puffs every 6 (six) hours as needed for wheezing. 1 each 11     escitalopram oxalate (LEXAPRO) 10 MG tablet Take 1 tablet (10 mg total) by mouth daily. 30 tablet 2     FLUoxetine (PROZAC) 20 MG capsule Take 1 capsule (20 mg total) by mouth daily. 7  capsule 0     HYDROcodone-acetaminophen (NORCO )  mg per tablet Take 1 tablet by mouth every 6 (six) hours as needed for pain. 120 tablet 0     hydrOXYzine pamoate (VISTARIL) 50 MG capsule TAKE ONE CAPSULE BY MOUTH THREE TIMES A DAY AS NEEDED FOR ANXIETY 90 capsule 1     mirtazapine (REMERON) 15 MG tablet Take 1 tablet (15 mg total) by mouth at bedtime. 30 tablet 2     mometasone-formoterol (DULERA) 100-5 mcg/actuation HFAA inhaler Inhale 2 puffs 2 (two) times a day. 1 Inhaler 3     SUMAtriptan (IMITREX) 50 MG tablet Take 1 tablet (50 mg total) by mouth once as needed for migraine. 30 tablet 2     [DISCONTINUED] FLUoxetine (PROZAC) 40 MG capsule Take 1 capsule (40 mg total) by mouth daily. 90 capsule 1     [DISCONTINUED] QUEtiapine (SEROQUEL) 50 MG tablet Take 1 tablet (50 mg total) by mouth at bedtime. 30 tablet 3     No facility-administered encounter medications on file as of 3/14/2019.      Past Medical History:   Diagnosis Date     Arthritis      Depression      Past Surgical History:   Procedure Laterality Date     BACK SURGERY        SECTION      x 4     TONSILLECTOMY       Social History     Tobacco Use     Smoking status: Current Every Day Smoker     Packs/day: 1.00     Years: 33.00     Pack years: 33.00     Types: Cigarettes     Start date: 1980     Smokeless tobacco: Never Used     Tobacco comment: Rolls her own cigarettes- goes through ~2lbs per month   Substance Use Topics     Alcohol use: No     Frequency: Never     Drug use: No       Objective / Physical Examination:  Vitals:    19 1050   BP: 129/71   Patient Site: Right Arm   Patient Position: Sitting   Cuff Size: Adult Regular   Pulse: 80   Resp: 12   SpO2: 96%   Weight: 191 lb (86.6 kg)     Wt Readings from Last 3 Encounters:   19 191 lb (86.6 kg)   19 195 lb (88.5 kg)   19 195 lb (88.5 kg)     BP Readings from Last 3 Encounters:   19 129/71   19 130/80   19 116/73     Body mass  index is 37.3 kg/m .   Results for orders placed or performed in visit on 01/31/19   Drug Abuse 1+, Urine   Result Value Ref Range    Amphetamines Screen Negative Screen Negative    Benzodiazepines Screen Negative Screen Negative    Opiates (!) Screen Negative     Screen Positive (Confirmation available on request)    Phencyclidine Screen Negative Screen Negative    THC Screen Negative Screen Negative    Barbiturates Screen Negative Screen Negative    Cocaine Metabolite Screen Negative Screen Negative    Methadone Screen Negative Screen Negative    Oxycodone Screen Negative Screen Negative    Creatinine, Urine 67.0 mg/dL           General Appearance: Alert and oriented, cooperative, affect appropriate, speech clear, in no apparent distress  Lungs: Clear to auscultation bilaterally. Normal inspiratory and expiratory effort  Cardiovascular: Regular rate, normal S1, S2. No murmurs, rubs, or gallops  Abdomen: Bowel sounds active all four quadrants. Soft, non-tender. No hepatomegaly or splenomegaly. No bruits detected.   Extremities: Pulses 2+ and equal throughout. No edema. Strength equal throughout.  Integumentary: Warm and dry. Without suspicious looking lesions  Neuro: Alert and oriented, follows commands appropriately.     No orders of the defined types were placed in this encounter.  Followup: Return in about 4 weeks (around 4/11/2019) for Recheck. earlier if needed.    Total time spent with patient was 30 min with >50% of time spent in face-to-face counseling regarding the above plan       Benita Rivera MD

## 2021-06-24 NOTE — TELEPHONE ENCOUNTER
Controlled Substance Refill Request  Medication Name:   Requested Prescriptions     Pending Prescriptions Disp Refills     HYDROcodone-acetaminophen (NORCO )  mg per tablet 120 tablet 0     Sig: Take 1 tablet by mouth every 6 (six) hours as needed for pain.     Date Last Fill: 01/31/19  Pharmacy: [place], [place] - [address] [facility]  Submit electronically to pharmacy  Controlled Substance Agreement Date Scanned:   Encounter-Level CSA Scan Date:    There are no encounter-level csa scan date.       Last office visit with prescriber/PCP: 1/31/2019 Benita Rivera MD OR same dept: 1/31/2019 Benita Rivera MD OR same specialty: 1/31/2019 Benita Rivera MD  Last physical: Visit date not found Last MTM visit: Visit date not found

## 2021-06-24 NOTE — TELEPHONE ENCOUNTER
Name of form/paperwork: Other:  [employer] for electric bed form  Have you been seen for this request: N/A  Do we have the form: States has been faxed to clinic on 2/18/2019  When is form needed by: asap  How would you like the form returned: fax  Fax Number: on form  Patient Notified form requests are processed in 3-5 business days: No  (If patient needs form sooner, please note that in this message.) Needs asap  Okay to leave a detailed message? Yes    Please call Obdulia at [employer] [phone] if did not receive the form for electric bed.    Please update Radha at [phone].

## 2021-06-24 NOTE — TELEPHONE ENCOUNTER
Form placed in Dr. Rivera's inbox to address.   [facility]    History and Physical - Hospitalist Service       Date of Admission:  12/19/2020    Assessment & Plan      Braxton Mantilla is a 72 year old male admitted on 12/19/2020. He presents with hip pain following a fall.       Closed displaced intertrochanteric fracture of left femur, initial encounter (H)    Fall, initial encounter    Knee abrasion, left, initial encounter    Assessment: Presents with left hip pain in setting of what appears to be a mechanical fall on the day of admission.  X-ray of his left pelvis shows a mild to moderate displacement of his left intertrochanteric fracture.  His CT head was negative given there was concern of some degree of head trauma.  His chest x-ray shows patchy left basilar opacities, but he is without any significant pulmonary symptoms.    Plan:   -Admit inpatient  -Orthopedic surgery consultation  -pain control as needed  -Fall precautions  -Regular diet if no plans for surgery today  -Follow vitals/temp      Chronic atrial fibrillation (H)    Long term current use of anticoagulant therapy    Assessment/Plan: hold PTA Xarelto given likely need for surgical intervention.       Essential hypertension    Assessment/Plan: resume PTA BB / Lisinopril / Norvasc in AM      Hereditary and idiopathic peripheral neuropathy      Type 2 diabetes mellitus (H)    Assessment/Plan: currently diet controlled      Hypothyroidism    Assessment/Plan: continue PTA thyroid replacement         Diet: NPO for Medical/Clinical Reasons Except for: Meds, Ice Chips    DVT Prophylaxis: Xarelto  Spicer Catheter: not present  Code Status: Full Code           Disposition Plan   Expected discharge: 2 - 3 days, recommended to prior living arrangement once adequate pain management/ tolerating PO medications and safe disposition plan/ TCU bed available.  Entered: Beltran Rea MD 12/19/2020, 1:00 PM     The patient's care was discussed with the Patient and ED Provider.    Beltran Rea  MD [facility]  Contact information available via [facility] Paging/Directory      ______________________________________________________________________    Chief Complaint     Hip Pain   Fall    History is obtained from the patient    History of Present Illness   Braxton Mantilla is a 72 year old male with past medical history of chronic atrial fibrillation on Xarelto, hypertension, type 2 diabetes mellitus, hypothyroidism who presents for evaluation of fall and hip pain.    Patient reports that he was at his home in [facility], in his usual state of health, when he was backing up into his bed on the day of admission and he reported that his knees buckled and he sustained a fall in which she landed on his left hip.  He had significant pain following his fall, and ultimately had to crawl onto his bed, where he activated EMS.  He denied losing consciousness, he denied any head trauma to the best of his knowledge.  He denies any chest pain or shortness of breath, no lightheadedness, no presyncopal symptoms prior to his fall.  He denies any recent fevers or chills, no Covid-like symptoms.  He denied any recent nausea/vomiting or abdominal pain.  No recent diarrhea, no blood in stool, no weight loss or night sweats.  He denies any significant headaches, slurred speech, localized weakness or numbness of his extremities.  He denies any history of recurrent falls.  He otherwise has no loss of bowel or bladder function, no dysuria, no urinary urgency or frequency.  At this time he has no complaints.    Review of Systems    The 10 point Review of Systems is negative other than noted in the HPI or here.     Past Medical History    I have reviewed this patient's medical history and updated it with pertinent information if needed.   Past Medical History:   Diagnosis Date     Benign essential hypertension      Hypothyroidism      Type 2 diabetes mellitus (H)        Past Surgical History   I have  reviewed this patient's surgical history and updated it with pertinent information if needed.  History reviewed. No pertinent surgical history.    Social History   I have reviewed this patient's social history and updated it with pertinent information if needed.  Social History     Tobacco Use     Smoking status: None   Substance Use Topics     Alcohol use: None     Drug use: None       Family History   I have reviewed this patient's family history and updated it with pertinent information if needed.  Family History   Problem Relation Age of Onset     No Known Problems Mother      No Known Problems Father      Prior to Admission Medications   Prior to Admission Medications   Prescriptions Last Dose Informant Patient Reported? Taking?   acetaminophen (TYLENOL) 500 MG tablet  at prn Self Yes Yes   Sig: Take 500-1,000 mg by mouth every 8 hours as needed for mild pain   amLODIPine (NORVASC) 10 MG tablet 12/18/2020 at Unknown time Self Yes Yes   Sig: Take 10 mg by mouth daily   lactulose (CHRONULAC) 10 GM/15ML solution  at prn Self Yes Yes   Sig: Take 10 g by mouth 2 times daily as needed for constipation   levothyroxine (SYNTHROID/LEVOTHROID) 112 MCG tablet 12/18/2020 at Unknown time Self Yes Yes   Sig: Take 112 mcg by mouth daily   lisinopril (ZESTRIL) 40 MG tablet 12/18/2020 at Unknown time Self Yes Yes   Sig: Take 20 mg by mouth daily   methocarbamol (ROBAXIN) 500 MG tablet  at prn Self Yes Yes   Sig: Take 500 mg by mouth 2 times daily as needed for muscle spasms   metoprolol succinate ER (TOPROL-XL) 100 MG 24 hr tablet 12/18/2020 at Unknown time Self Yes Yes   Sig: Take 50 mg by mouth daily   rivaroxaban ANTICOAGULANT (XARELTO) 20 MG TABS tablet 12/18/2020 at Unknown time Self Yes Yes   Sig: Take 20 mg by mouth daily (with dinner)   senna-docusate (SENOKOT-S/PERICOLACE) 8.6-50 MG tablet 12/18/2020 at Unknown time Self Yes Yes   Sig: Take 1 tablet by mouth daily And daily PRN   sildenafil (VIAGRA) 100 MG tablet  at  prn Self Yes Yes   Sig: Take 100 mg by mouth daily as needed   simvastatin (ZOCOR) 80 MG tablet 12/18/2020 at Unknown time Self Yes Yes   Sig: Take by mouth At Bedtime      Facility-Administered Medications: None     Allergies   No Known Allergies    Physical Exam   Vital Signs: Temp: 98.4  F (36.9  C) Temp src: Oral BP: 132/75 Pulse: 93   Resp: 16 SpO2: 98 % O2 Device: None (Room air)    Weight: 145 lbs 0 oz    Constitutional: awake, alert, cooperative, no apparent distress.   Eyes: Lids and lashes normal, pupils equal, round and reactive to light   ENT: Normocephalic, without obvious abnormality, atraumatic, sinuses nontender on palpation   Hematologic / Lymphatic: no cervical lymphadenopathy   Respiratory: CTABL   Cardiovascular: RRR with no m/r/g   GI: Normal bowel sounds, soft, non-distended, non-tender.   Skin: normal skin color, texture, turgor.  There is an abrasion over his left knee.  Musculoskeletal: Patient's range of motion in his left hip is severely limited secondary to pain.  Neurologic: Awake, alert, oriented to name, place and time. Cranial nerves II-XII are grossly intact. Motor is 5 out of 5 bilaterally. Sensory is intact.   Neuropsychiatric: normal mood and affect    Data   Data reviewed today: I reviewed all medications, new labs and imaging results over the last 24 hours. I personally reviewed the EKG tracing showing Afib with CVR, the chest x-ray image(s) showing see below, the head CT image(s) showing see below and the XR Pelvis image(s) showing see below.    XR Chest 1 View  IMPRESSION:   1. Patchy left basilar opacities, may represent atelectasis or pneumonitis and follow-up is recommended to assess for resolution.   2. No pleural effusion or pneumothorax.   3. Cardiomediastinal silhouette unremarkable.      As read by Radiology.     XR Pelvis w Hip Left 1 View  IMPRESSION: Mild to moderate displacement of left intertrochanteric fracture. Hip joint spaces are fairly well preserved.  Aortoiliac stent Seen.     As read by Radiology.     CT Head wo Contrast  IMPRESSION:   1. No acute cranial process.  2. No fractures  3. Mild chronic-appearing ischemic changes intracranially as above,  age appropriate.  4. No extra-axial blood products/fluid collections are noted.    Most Recent 3 CBC's:  Recent Labs   Lab Test 12/19/20  0846   WBC 11.4*   HGB 13.6   *        Most Recent 3 BMP's:  Recent Labs   Lab Test 12/19/20  0846      POTASSIUM 4.1   CHLORIDE 106   CO2 25   BUN 9   CR 1.00   ANIONGAP 7   FADI 8.6   *     Most Recent 2 LFT's:No lab results found.  Most Recent 3 INR's:  Recent Labs   Lab Test 12/19/20  0846   INR 1.27*     Most Recent ESR & CRP:No lab results found.  Recent Results (from the past 24 hour(s))   XR Pelvis w Hip Left 1 View    Narrative    PELVIS AND HIP LEFT ONE VIEW   12/19/2020 9:01 AM     HISTORY: Pain    COMPARISON: 3/11/2004 x-ray.      Impression    IMPRESSION: Mild to moderate displacement of left intertrochanteric  fracture. Hip joint spaces are fairly well preserved. Aortoiliac stent  seen.    FAUSTO HAILE MD   XR Chest 1 View    Narrative    XR CHEST 1 VW   12/19/2020 9:01 AM     HISTORY: Fall, possible broken hip    COMPARISON: None.      Impression    IMPRESSION:   1. Patchy left basilar opacities, may represent atelectasis or  pneumonitis and follow-up is recommended to assess for resolution.  2. No pleural effusion or pneumothorax.  3. Cardiomediastinal silhouette unremarkable.    MARBIN SZYMANSKI MD   CT Head w/o Contrast    Narrative    CT SCAN OF THE HEAD WITHOUT CONTRAST   12/19/2020 11:03 AM     HISTORY: Fell, bumped head, on Xarelto.    TECHNIQUE: Axial images of the head and coronal reformations without  IV contrast material. Radiation dose for this scan was reduced using  automated exposure control, adjustment of the mA and/or kV according  to patient size, or iterative reconstruction technique.    COMPARISON: None.    FINDINGS:      Intracranial contents: There is no evidence for midline shift mass or  mass effect. No evidence for acute infarction. No extra-axial blood  products or fluid collections. Mild generalized cerebral and  cerebellar parenchymal volume loss, age appropriate. Mild chronic  small vessel ischemic/degenerative changes of aging are identified in  the deep white matter both cerebral hemispheres. No extra-axial blood  products or fluid collections are noted. Satisfactory position of the  cerebellar tonsils. Sella appears within normal limits.    There is no evidence of intracranial hemorrhage, mass, acute infarct  or anomaly.    Visualized orbits/sinuses/mastoids: Paranasal sinuses and mastoid air  cells are clear with no air-fluid levels. Debris/cerumen in left EAC  suggested. Degenerative changes both TMJs. No acute intraorbital  process.    Osseous structures/soft tissues: No evidence for acute fracture of the  calvarium or skull base. No significant swelling of the facial or  scalp tissues is apparent. No significant scalp hematoma is  identified.      Impression    IMPRESSION:   1. No acute intracranial process.  2. No fractures  3. Mild chronic-appearing ischemic changes intracranially as above,  age appropriate.  4. No extra-axial blood products/fluid collections are noted.      KEVON FENG MD

## 2021-06-24 NOTE — TELEPHONE ENCOUNTER
Refill Approved    Rx renewed per Medication Renewal Policy. Medication was last renewed on 1/31/19..    Raven Dangelo, [facility] Triage/Med Refill 3/6/2019     Requested Prescriptions   Pending Prescriptions Disp Refills     hydrOXYzine pamoate (VISTARIL) 50 MG capsule [Pharmacy Med Name: hydrOXYzine 50MG PAMOATE CAP] 90 capsule 0     Sig: TAKE ONE CAPSULE BY MOUTH THREE TIMES A DAY AS NEEDED FOR ANXIETY    Antihistamine Refill Protocol Passed - 3/4/2019 12:02 PM       Passed - Patient has had office visit/physical in last year    Last office visit with prescriber/PCP: 1/31/2019 Benita Rivera MD OR same dept: 1/31/2019 Benita Rivera MD OR same specialty: 1/31/2019 Benita Rivera MD  Last physical: Visit date not found Last MTM visit: Visit date not found   Next visit within 3 mo: Visit date not found  Next physical within 3 mo: Visit date not found  Prescriber OR PCP: Benita Rivera MD  Last diagnosis associated with med order: There are no diagnoses linked to this encounter.  If protocol passes may refill for 12 months if within 3 months of last provider visit (or a total of 15 months).

## 2021-06-24 NOTE — TELEPHONE ENCOUNTER
Last fill 01/31/2019    Last seen 01/31/2019    Next appointment 03/14/2019     Return in about 6 weeks (around 3/14/2019).

## 2021-06-25 ENCOUNTER — COMMUNICATION - HEALTHEAST (OUTPATIENT)
Dept: FAMILY MEDICINE | Facility: CLINIC | Age: 56
End: 2021-06-25

## 2021-06-25 ENCOUNTER — COMMUNICATION - HEALTHEAST (OUTPATIENT)
Dept: INTERNAL MEDICINE | Facility: CLINIC | Age: 56
End: 2021-06-25

## 2021-06-25 DIAGNOSIS — G89.29 CHRONIC NECK PAIN: ICD-10-CM

## 2021-06-25 DIAGNOSIS — M54.2 CHRONIC NECK PAIN: ICD-10-CM

## 2021-06-25 DIAGNOSIS — F11.90 CHRONIC, CONTINUOUS USE OF OPIOIDS: ICD-10-CM

## 2021-06-25 DIAGNOSIS — G89.4 CHRONIC PAIN SYNDROME: ICD-10-CM

## 2021-06-26 NOTE — TELEPHONE ENCOUNTER
Pt is requesting pain medication RX.      States she saw  saw her yesterday and she was treated for UTI.      He was going to refill her pain medication.    Best way to reach patient is via My Chart as her phone is suspended.

## 2021-06-26 NOTE — TELEPHONE ENCOUNTER
Dr. Rivera    Patient said that she got a call from the pain clinic and they need a referral sent to them.  Please order a referral to the pain clinic.

## 2021-06-26 NOTE — TELEPHONE ENCOUNTER
See below.  Please place orders if appropriate.  Facility will contact patient for scheduling once order has been placed.

## 2021-06-27 ENCOUNTER — HEALTH MAINTENANCE LETTER (OUTPATIENT)
Age: 56
End: 2021-06-27

## 2021-06-28 NOTE — PROGRESS NOTES
Progress Notes by Rosemarie Miller CMA at 4/3/2020 12:00 PM     Author: Rosemarie Miller CMA Service: Psychiatry Author Type: Certified Medical Assistant    Filed: 4/3/2020  1:49 PM Encounter Date: 4/3/2020 Status: Signed    : Rosemarie Miller CMA (Certified Medical Assistant)       Pt was referred by Dr. [facility] Neurosurgery for consult for psychiatric care.    PHQ-9: 25  DONNY-7: 18  Mood-Current: 6     Past Medications tried: hydroxyzine-Per Pt did not help with Anxiety and only helped with itching.    Admits to SI thoughts from time to time. Denies plan or intent.     Writer was able to confirm Allergies, Medications, and Pharmacy via phone.    [place] :

## 2021-06-28 NOTE — PROGRESS NOTES
Progress Notes by Jack Martel at 3/2/2020  2:30 PM     Author: Jack Martel Service: -- Author Type: Medical Student    Filed: 3/6/2020  7:13 AM Encounter Date: 3/2/2020 Status: Addendum    : Benita Rivera MD (Physician)    Related Notes: Original Note by Jack Martel (Medical Student) filed at 3/6/2020  7:12 AM       Family Medicine Office Visit  [facility] [place] [facility]  Patient Name: Divina Blue  Patient Age: 54 y.o.  YOB: 1965  MRN: [id number]    Date of Visit: 3/2/2020  Reason for Office Visit:   Chief Complaint   Patient presents with   ? Med Check           Assessment / Plan / Medical Decision Makin. Severe episode of recurrent major depressive disorder, without psychotic features (H)  - Will start Wellbutrin to help with smoking cessation in the setting of MDD  - Mental health Appointment scheduled for 4/3/2020    2. Chronic neck pain  - Ambulatory referral to Pain Clinic  - HYDROcodone-acetaminophen (NORCO) 7.5-325 mg per tablet; Take 1 tablet by mouth every 8 (eight) hours as needed for pain.  Dispense: 90 tablet; Refill: 0    3. Chronic, continuous use of opioids  - Ambulatory referral to Pain Clinic  - HYDROcodone-acetaminophen (NORCO) 7.5-325 mg per tablet; Take 1 tablet by mouth every 8 (eight) hours as needed for pain.  Dispense: 90 tablet; Refill: 0    4. Encounter for tobacco use cessation counseling  5. Nicotine use disorder  - buPROPion (WELLBUTRIN XL) 150 MG 24 hr tablet; Take 1 tablet (150 mg total) by mouth every morning.  Dispense: 30 tablet; Refill: 2        Health Maintenance Review  Health Maintenance   Topic Date Due   ? DEPRESSION ACTION PLAN  1965   ? HEPATITIS C SCREENING  1965   ? PREVENTIVE CARE VISIT  1965   ? MAMMOGRAM  1965   ? HIV SCREENING  1979   ? ADVANCE CARE PLANNING  1982   ? PAP SMEAR  1985   ? ZOSTER VACCINES (1 of 2) 2014   ? INFLUENZA VACCINE RULE  BASED (1) 08/01/2019   ? COLOGUARD  04/03/2020 (Originally 11/30/2014)   ? TD 18+ HE  03/06/2022   ? LIPID  08/29/2024   ? TDAP ADULT ONE TIME DOSE  Completed         I have discontinued Divina Blue's HYDROcodone-acetaminophen. I am also having her start on buPROPion and HYDROcodone-acetaminophen.      HPI:  Divina Blue is a 54 y.o. year old female with a history of chronic radicular back pain who presents to the office today for a medication check. She is taking Norco 5-325 every 8 hours. In the past, she has been on 10 mg every 6 hours and would like to go back to her previous dose as her pain is not controlled. Patient was recently evaluated by her neurosurgeon who recommended c-spine surgery. Unfortunately, Divina is ineligible for surgery until she stops smoking. She was almost successful and had quit for one week, but has major stressors at home that prevented her from quitting fully.  She is open to trying Wellbutrin.       Review of Systems- pertinent positive in bold:  Constitutional: Fever, chills, night sweats, fainting, weight change, fatigue, seizures, dizziness, sleeping difficulties, loud snoring/pauses in breathing  Eyes: change in vision, blurred or double vision, redness/eye pain  Ears, nose, mouth, throat: change in hearing, ear pain, hoarseness, difficulty swallowing, sores in the mouth or throat  Respiratory: shortness of breath, cough, bloody sputum, wheezing, congestion  Cardiovascular: chest pain, palpitations   Gastrointestinal: abdominal pain, heartburn/indigestion, nausea/vomiting, change in appetite, change in bowel habits, constipation or diarrhea, rectal bleeding/dark stools, difficulty swallowing  Urinary: painful urination, frequent urination, urinary urgency/incontinence, blood in urine/dark urine, nocturia  Musculoskeletal: backache/back pain (new or increasing), weakness, joint pain/stiffness (new or increasing), muscle cramps, swelling of hands, feet, ankles, leg  pain/redness  Skin: change in moles/freckles, rash, nodules  Hematologic/lymphatic: swollen lymph glands, abnormal bruising/bleeding  Endocrine: excessive thirst/urination, cold or heat intolerance  Neurologic/emotional: worrisome memory change, numbness/tingling, anxiety, mood swings      Current Scheduled Meds:  Outpatient Encounter Medications as of 3/2/2020   Medication Sig Dispense Refill   ? [DISCONTINUED] HYDROcodone-acetaminophen 5-325 mg per tablet Take 1 tablet by mouth every 8 (eight) hours as needed for pain. 90 tablet 0   ? buPROPion (WELLBUTRIN XL) 150 MG 24 hr tablet Take 1 tablet (150 mg total) by mouth every morning. 30 tablet 2   ? HYDROcodone-acetaminophen (NORCO) 7.5-325 mg per tablet Take 1 tablet by mouth every 8 (eight) hours as needed for pain. 90 tablet 0     No facility-administered encounter medications on file as of 3/2/2020.      Past Medical History:   Diagnosis Date   ? Arthritis    ? Chronic radicular pain of lower back 2019   ? Depression    ? DONNY (generalized anxiety disorder) 2019   ? Insomnia, unspecified type 2019   ? Severe episode of recurrent major depressive disorder, without psychotic features (H) 2019   ? SOB (shortness of breath) 2019     Past Surgical History:   Procedure Laterality Date   ? BACK SURGERY     ?  SECTION      x 4   ? TONSILLECTOMY       Social History     Tobacco Use   ? Smoking status: Current Every Day Smoker     Packs/day: 1.00     Years: 33.00     Pack years: 33.00     Types: Cigarettes     Start date: 1980   ? Smokeless tobacco: Never Used   ? Tobacco comment: Rolls her own cigarettes- goes through ~2lbs per month   Substance Use Topics   ? Alcohol use: No     Frequency: Never   ? Drug use: No       Objective / Physical Examination:  Vitals:    20 1348   BP: 122/74   Patient Site: Right Arm   Patient Position: Sitting   Cuff Size: Adult Regular   Pulse: (!) 112   SpO2: 98%   Weight: 191 lb 1.6 oz (86.7 kg)    Height: 5' (1.524 m)     Wt Readings from Last 3 Encounters:   03/02/20 191 lb 1.6 oz (86.7 kg)   02/03/20 183 lb (83 kg)   01/28/20 176 lb 4.8 oz (80 kg)     BP Readings from Last 3 Encounters:   03/02/20 122/74   02/03/20 122/76   01/28/20 121/82     Body mass index is 37.32 kg/m .   Results for orders placed or performed in visit on 02/03/20   Drug Abuse 1+, Urine   Result Value Ref Range    Amphetamines Screen Negative Screen Negative    Benzodiazepines Screen Negative Screen Negative    Opiates (!) Screen Negative     Screen Positive (Confirmation available on request)    Phencyclidine Screen Negative Screen Negative    THC Screen Negative Screen Negative    Barbiturates Screen Negative Screen Negative    Cocaine Metabolite Screen Negative Screen Negative    Methadone Screen Negative Screen Negative    Oxycodone (!) Screen Negative     Screen Positive (Confirmation available on request)    Creatinine, Urine 86.1 mg/dL           General Appearance: Alert and oriented, cooperative, speech clear, tearful when discussing home life  Head: Normocephalic, atraumatic  Eyes: PERRL,  EOMI. Conjunctivae clear and sclerae non-icteric, bilateral xanthelasmas   Nose: Septum midline, nares patent  Throat: Lips and mucosa moist. pharynx without erythema or exudate, edentulous maxilla  Neck: Supple, trachea midline  Back: Kyphosis, pain limiting mobility   Lungs: Clear to auscultation bilaterally. Normal inspiratory and expiratory effort  Cardiovascular: Regular rate, normal S1, S2. No murmurs, rubs, or gallops  Integumentary: Warm and dry. Without suspicious looking lesions  Neuro: Alert and oriented, follows commands appropriately.     Orders Placed This Encounter   Procedures   ? Ambulatory referral to Pain Clinic   Followup: No follow-ups on file. earlier if needed.    Total time spent with patient was 30 with >50% of time spent in face-to-face counseling regarding the above plan     Jack Martel, MS3  [facility]  [employer]  Class of 2021  2:47 PM  03/02/20    I was present with the medical student who participated in the serviceand in the documentation of this note. I have verified the history and personally performed the physical exam and medical decision making, and have verified the content of the note, which accurately reflects my assessment of the patient and the plan of care.    Benita Rivera MD

## 2021-06-29 NOTE — PROGRESS NOTES
Progress Notes by Katelynn Feng CMA at 6/17/2020 12:00 PM     Author: Katelynn Feng CMA Service: -- Author Type: Certified Medical Assistant    Filed: 6/17/2020 12:27 PM Encounter Date: 6/17/2020 Status: Signed    : Katelynn Feng CMA (Certified Medical Assistant)       This video/telephone visit will be conducted via a call between you and your physician/provider. We have found that certain health care needs can be provided without the need for an in-person physical exam. This service lets us provide the care you need with a video /telephone conversation. If a prescription is necessary we can send it directly to your pharmacy. If lab work is needed we can place an order for that and you can then stop by our lab to have the test done at a later time.   Just as we bill insurance for in-person visits, we also bill insurance for video/telephone visits. If you have questions about your insurance coverage, we recommend that you speak with your insurance company.   Patient has given verbal consent for Telephone visit? Yes  Patient would like the phone call to [phone]  Patient verified allergies, medications and pharmacy via phone.     Katelynn Feng CMA  [place]  was reviewed prior to seeing patient today. See below for embedded report.

## 2021-06-29 NOTE — PROGRESS NOTES
Progress Notes by Cesilia Inman LPN at 7/15/2020 12:30 PM     Author: Cesilia Inman LPN Service: -- Author Type: Licensed Nurse    Filed: 7/15/2020  1:03 PM Encounter Date: 7/15/2020 Status: Signed    : Cesilia Inman LPN (Licensed Nurse)       This video/telephone visit will be conducted via a call between you and your physician/provider. We have found that certain health care needs can be provided without the need for an in-person physical exam. This service lets us provide the care you need with a video /telephone conversation. If a prescription is necessary we can send it directly to your pharmacy. If lab work is needed we can place an order for that and you can then stop by our lab to have the test done at a later time.   Just as we bill insurance for in-person visits, we also bill insurance for video/telephone visits. If you have questions about your insurance coverage, we recommend that you speak with your insurance company.   Patient has given verbal consent for video/Telephone visit? yes  Patient would like the video visit invitation sent by: Text to cell phone: NA or Send to email: NA  MARCIANO/LPN: AD, LPN  Pt is complaining of disturbing sleep, said she skipped last 2 days of Prozac because of excessive sweating.   Patient verified allergies, medications and pharmacy via phone. Patient states she  is ready for visit.

## 2021-07-01 ENCOUNTER — COMMUNICATION - HEALTHEAST (OUTPATIENT)
Dept: FAMILY MEDICINE | Facility: CLINIC | Age: 56
End: 2021-07-01

## 2021-07-01 DIAGNOSIS — G89.4 CHRONIC PAIN SYNDROME: ICD-10-CM

## 2021-07-02 ENCOUNTER — COMMUNICATION - HEALTHEAST (OUTPATIENT)
Dept: PHARMACY | Facility: HOSPITAL | Age: 56
End: 2021-07-02

## 2021-07-02 DIAGNOSIS — G89.4 CHRONIC PAIN SYNDROME: ICD-10-CM

## 2021-07-03 NOTE — ADDENDUM NOTE
Addendum Note by Benita Rivera MD at 5/4/2020  7:17 AM     Author: Benita Rivera MD Service: -- Author Type: Physician    Filed: 5/4/2020  7:17 AM Encounter Date: 4/29/2020 Status: Signed    : Benita Rivera MD (Physician)    Addended by: BENITA RIVERA on: 5/4/2020 07:17 AM        Modules accepted: Orders

## 2021-07-04 NOTE — TELEPHONE ENCOUNTER
Contacted Zoie at [facility] pharmacy who explained the patient was given a 7 day supply (7 tabs) due to insurance.  Zoie explained that many insurance companies require a 7 day fill before any larger fills.  As a result of filling it for 7 tabs, the order is then cancelled.    Dr. KOKI Ferrari'd up 23 additional tablets if appropriate.    Kwaku Hager RN  [facility]

## 2021-07-04 NOTE — LETTER
Letter by Don Viveros MD at      Author: Don Viveros MD Service: -- Author Type: --    Filed:  Encounter Date: 6/24/2021 Status: (Other)       Opioid / Opioid Plus Controlled Substance Agreement    This is an agreement between you and your provider about the safe and appropriate use of controlled substance/opioids prescribed by your care team. Controlled substances are medicines that can cause physical and mental dependence (abuse).    There are strict laws about having and using these medicines. We here at [facility] are committing to working with you in your efforts to get better. To support you in this work, well help you schedule regular office appointments for medicine refills. If we must cancel or change your appointment for any reason, well make sure you have enough medicine to last until your next appointment.     As a Provider, I will:    Listen carefully to your concerns and treat you with respect.     Recommend a treatment plan that I believe is in your best interest. This plan may involve therapies other than opioid pain medication.     Talk with you often about the possible benefits, and the risk of harm of any medicine that we prescribe for you.     Provide a plan on how to taper (discontinue or go off) using this medicine if the decision is made to stop its use.    As a Patient, I understand that opioid(s):     Are a controlled substance prescribed by my care team to help me function or work and manage my condition(s).     Are strong medicines and can cause serious side effects such as:    Drowsiness, which can seriously affect my driving ability    A lower breathing rate, enough to cause death    Harm to my thinking ability     Depression     Abuse of and addiction to this medicine    Need to be taken exactly as prescribed. Combining opioids with certain medicines or chemicals (such as illegal drugs, sedatives, sleeping pills, and benzodiazepines) can be dangerous or even  fatal. If I stop opioids suddenly, I may have severe withdrawal symptoms.    Do not work for all types of pain nor for all patients. If theyre not helpful, I may be asked to stop them.        The risks, benefits and side effects of these medicine(s) were explained to me. I agree that:  1. I will take part in other treatments as advised by my care team. This may be psychiatry or counseling, physical therapy, behavioral therapy, group treatment or a referral to a specialist.     2. I will keep all my appointments. I understand that this is part of the monitoring of opioids. My care team may require an office visit for EVERY opioid/controlled substance refill. If I miss appointments or dont follow instructions, my care team may stop my medicine.    3. I will take my medicines as prescribed. I will not change the dose or schedule unless my care team tells me to. There will be no refills if I run out early.     4. I may be asked to come to the clinic and complete a urine drug test or complete a pill count at any time. If I dont give a urine sample or participate in a pill count, the care team may stop my medicine.    5. I will only receive prescriptions from this clinic for chronic pain. If I am treated by another provider for acute pain issues, I will tell them that I am taking opioid pain medication for chronic pain and that I have a treatment agreement with this provider. I will inform my [facility] care team within one business day if I am given a prescription for any pain medication by another healthcare provider. My [facility] care team can contact other providers and pharmacists about my use of any medicines.    6. It is up to me to make sure that I dont run out of my medicines on weekends or holidays. If my care team is willing to refill my opioid prescription without a visit, I must request refills only during office hours. Refills may take up to 3 business days to process. I will use one pharmacy  to fill all my opioid and other controlled substance prescriptions. I will notify the clinic about any changes to my insurance or medication availability.    7. I am responsible for my prescriptions. If the medicine/prescription is lost, stolen or destroyed, it will not be replaced. I also agree not to share controlled substance medicines with anyone.    8. I am aware I should not use any illegal or recreational drugs. I agree not to drink alcohol unless my care team says I can.       9. If I enroll in the [place] [facility] Cannabis program, I will tell my care team prior to my next refill.     10. I will tell my care team right away if I become pregnant, have a new medical problem treated outside of my regular clinic, or have a change in my medications.    11. I understand that this medicine can affect my thinking, judgment and reaction time. Alcohol and drugs affect the brain and body, which can affect the safety of my driving. Being under the influence of alcohol or drugs can affect my decision-making, behaviors, personal safety, and the safety of others. Driving while impaired (DWI) can occur if a person is driving, operating, or in physical control of a car, motorcycle, boat, snowmobile, ATV, motorbike, off-road vehicle, or any other motor vehicle ([place] Statute 169A.20). I understand the risk if I choose to drive or operate any vehicle or machinery.    I understand that if I do not follow any of the conditions above, my prescriptions or treatment may be stopped or changed.        Opioids  What You Need to Know    What are opioids?   Opioids are pain medicines that must be prescribed by a doctor. They are also known as narcotics.     Examples are:   1. morphine (MS Contin, Leslie)  2. oxycodone (Oxycontin)  3. oxycodone and acetaminophen (Percocet)  4. hydrocodone and acetaminophen (Vicodin, Norco)   5. fentanyl patch (Duragesic)   6. hydromorphone (Dilaudid)   7. methadone  8. codeine (Tylenol #3)     What do  opioids do well?   Opioids are best for severe short-term pain such as after a surgery or injury. They may work well for cancer pain. They may help some people with long-lasting (chronic) pain.     What do opioids NOT do well?   Opioids never get rid of pain entirely, and they dont work well for most patients with chronic pain. Opioids dont reduce swelling, one of the causes of pain.                              Other ways to manage chronic pain and improve function include:       Treat the health problem that may be causing pain    Anti-inflammation medicines, which reduce swelling and tenderness, such as ibuprofen (Advil, Motrin) or naproxen (Aleve)    Acetaminophen (Tylenol)    Antidepressants and anti-seizure medicines, especially for nerve pain    Topical treatments such as patches or creams    Injections or nerve blocks    Chiropractic or osteopathic treatment    Acupuncture, massage, deep breathing, meditation, visual imagery, aromatherapy    Use heat or ice at the pain site    Physical therapy     Exercise    Stop smoking    Take part in therapy       Risks and side effects     Talk to your doctor before you start or decide to keep taking opioids. Possible side effects include:      Lowering your breathing rate enough to cause death    Overdose, including death, especially if taking higher than prescribed doses    Worse depression symptoms; less pleasure in things you usually enjoy    Feeling tired or sluggish    Slower thoughts or cloudy thinking    Being more sensitive to pain over time; pain is harder to control    Trouble sleeping or restless sleep    Changes in hormone levels (for example, less testosterone)    Changes in sex drive or ability to have sex    Constipation    Unsafe driving    Itching and sweating    Dizziness    Nausea, throwing up and dry mouth    What else should I know about opioids?    Opioids may lead to dependence, tolerance, or addiction.      Dependence means that if you stop or  reduce the medicine too quickly, you will have withdrawal symptoms. These include loose poop (diarrhea), jitters, flu-like symptoms, nervousness and tremors. Dependence is not the same as addiction.                   Tolerance means needing higher doses over time to get the same effect. This may increase the chance of serious side effects.      Addiction is when people improperly use a substance that harms their body, their mind or their relations with others. Use of opiates can cause a relapse of addiction if you have a history of drug or alcohol abuse.      People who have used opioids for a long time may have a lower quality of life, worse depression, higher levels of pain and more visits to doctors.    You can overdose on opioids. Take these steps to lower your risk of overdose:    1. Recognize the signs:  Signs of overdose include decrease or loss of consciousness (blackout), slowed breathing, trouble waking up and blue lips. If someone is worried about overdose, they should call 911.    2. Talk to your doctor about Narcan (naloxone).   If you are at risk for overdose, you may be given a prescription for Narcan. This medicine very quickly reverses the effects of opioids.   If you overdose, a friend or family member can give you Narcan while waiting for the ambulance. They need to know the signs of overdose and how to give Narcan.     3. Don't use alcohol or street drugs.   Taking them with opioids can cause death.    4. Do not take any of these medicines unless your doctor says its OK. Taking these with opioids can cause death:    Benzodiazepines, such as lorazepam (Ativan), alprazolam (Xanax) or diazepam (Valium)    Muscle relaxers, such as cyclobenzaprine (Flexeril)    Sleeping pills like zolpidem (Ambien)     Other opioids      How to keep you and other people safe while taking opioids:    1. Never share your opioids with others.  Opioid medicines are regulated by the Drug Enforcement Agency ([employer]Y). Selling  or sharing medications is a criminal act.    2. Be sure to store opioids in a secure place, locked up if possible. Young children can easily swallow them and overdose.    3. When you are traveling with your medicines, keep them in the original bottles. If you use a pill box, be sure you also carry a copy of your medicine list from your clinic or pharmacy.    4. Safe disposal of opioids    Most pharmacies have places to get rid of medicine, called disposal kiosks. Medicine disposal options are also available in every [facility]. Search your county and medication disposal to find more options. You can find more details at:  https://[email or web address].[facility].[email or web address]./living-green/managing-unwanted-medications     I agree that my provider, clinic care team, and pharmacy may work with any city, state or federal law enforcement agency that investigates the misuse, sale, or other diversion of my controlled medicine. I will allow my provider to discuss my care with, or share a copy of, this agreement with any other treating provider, pharmacy or emergency room where I receive care.    I have read this agreement and have asked questions about anything I did not understand.      __________________________________________________  Patient Signature - Divina Blue   ______________________                      Date     __________________________________________________  Provider Signature - Don Viveros MD     ______________________                      Date     __________________________________________________  Witness Signature (required if provider not present while patient signing)     ______________________                      Date

## 2021-07-04 NOTE — TELEPHONE ENCOUNTER
Telephone Encounter by Lisa Rene NA at 7/2/2021  3:46 PM     Author: Lisa Rene NA Service: -- Author Type: Nursing Assistant    Filed: 7/2/2021  3:54 PM Encounter Date: 7/1/2021 Status: Signed    : Lisa Rene NA (Nursing Assistant)       Reason for Call:    Patient is frustrated, she had a virtual visit today, but a prescription was not sent in for her Hydrocodone.    Patient never received the   HYDROcodone-acetaminophen 5-325 mg per tablet 30 tablet     Patient is stating that she only got 7 pills at the last refill, per pharmacy, when they gave her the 7 pills, the original 30 tablet were cancelled and never received, with that, the provider needs to resend. The patient was only able to give her 7 pills while waiting for insurance to approve, insurance is approved needing Rx resent now.    Patient is out of tablets now, she is needing this Rx increased, she is having terrible pain that isn't allowing her to move around or use her arms. Please advise asap.      Call taken on 7/2/2021 at 3:46 PM by Lisa Rene            PAST MEDICAL HISTORY:  No pertinent past medical history

## 2021-07-04 NOTE — TELEPHONE ENCOUNTER
Telephone Encounter by Don Viveros MD at 7/2/2021 12:01 PM     Author: Don Viveros MD Service: -- Author Type: Physician    Filed: 7/2/2021 12:03 PM Encounter Date: 6/28/2021 Status: Signed    : Don Viveros MD (Physician)       Please contact patient with the following recommendation:   Chart and back/ lumbar imaging reviewed   Recommendation:     - referral to spine care / - Physical Therapy for the management of low back pain, and prescribed NSAIDs primarily for the pain management.    - allow for scarce use of opiate for severe pain. Will monitor use closely.   -  To sign CSA at the next appointment.   - referral to a different pain clinic system, since current insurance does not apply to the current system referred.     If agreed, will move forward as discussed.

## 2021-07-04 NOTE — TELEPHONE ENCOUNTER
Telephone Encounter by Don Viveros MD at 7/1/2021  3:45 PM     Author: Don Viveros MD Service: -- Author Type: Physician    Filed: 7/1/2021  3:45 PM Encounter Date: 7/1/2021 Status: Signed    : Don Viveros MD (Physician)       Has been reviewed by the pain clinic and psychiatric care is being advised.

## 2021-07-04 NOTE — TELEPHONE ENCOUNTER
Telephone Encounter by Zeonn Chavez RN at 7/2/2021  4:51 PM     Author: Zenon Chavez RN Service: -- Author Type: Registered Nurse    Filed: 7/2/2021  4:53 PM Encounter Date: 6/28/2021 Status: Signed    : Zenon Chavez RN (Registered Nurse)       Pt called in states the provider told her will contact her today.  The pt states she is waiting the provider respond on the pain medication refill.    Please advised.      Zenon Chavez RN, [facility] Triage/Med Refill 7/2/2021 4:53 PM

## 2021-07-04 NOTE — TELEPHONE ENCOUNTER
Telephone Encounter by Don Viveros MD at 7/2/2021  2:17 PM     Author: Don Viveros MD Service: -- Author Type: Physician    Filed: 7/2/2021  2:17 PM Encounter Date: 6/28/2021 Status: Signed    : Don Viveros MD (Physician)       pls see today's visit 7/2/2021

## 2021-07-04 NOTE — ADDENDUM NOTE
Addendum Note by FALLON CORONA at 6/29/2021  2:52 PM     Author: FALLON CORONA Service: -- Author Type: Registered Nurse    Filed: 6/29/2021  2:52 PM Encounter Date: 6/25/2021 Status: Signed    : FALLON CORONA, RN (Registered Nurse)    Addended by: FALLON CORONA on: 6/29/2021 02:52 PM        Modules accepted: Orders

## 2021-07-04 NOTE — TELEPHONE ENCOUNTER
Telephone Encounter by Bella Alejandre CMA at 7/2/2021  8:07 AM     Author: Bella Alejandre CMA Service: -- Author Type: Certified Medical Assistant    Filed: 7/2/2021  8:09 AM Encounter Date: 7/1/2021 Status: Signed    : Bella Alejandre CMA (Certified Medical Assistant)       Writer called this number [phone], phone went right to voice mail which stated that the VM was not set up yet, unable to leave message.

## 2021-07-04 NOTE — TELEPHONE ENCOUNTER
Telephone Encounter by Ct Sheets LPN at 7/2/2021 12:54 PM     Author: Ct Sheets LPN Service: -- Author Type: Licensed Nurse    Filed: 7/2/2021  1:09 PM Encounter Date: 6/28/2021 Status: Signed    : Ct Sheets LPN (Licensed Nurse)       Called to patient and relayed message. She stated that this does not make any sense. Her visit today was to discuss her hydrocodone that was prescribed at visit last week. She only received 7 pills and not the full 30 tablets that were prescribed.    Patient stated that at last visit that she was told she would be able to take up to 4 tablets daily but the instructions were for only 1 daily. Patient tried cutting the medication in half and taking one in the morning and one at night and this is not cutting it.    Patient already has been to the spine center and has been working with them. States that she needs to have surgery for this. Has to quit smoking for 2 months before procedure so she is working on trying to quit.    She is waiting to get in with pain clinic. States they are unable to get her in until August or September.     Virtual visit purpose was for refill of hydrocodone 5mg tablets. She was previously on 10mg. States she was told at last visit that she was going to get a script for 4 5mg tablets daily but was only sent in as 1 daily.     She is wanting the script to be updated to reflect this and sent to pharmacy. She is completely out of medication.     Patient has already signed a control substance agreement.(6/24/2021)

## 2021-07-04 NOTE — TELEPHONE ENCOUNTER
Telephone Encounter by Lisa Rene NA at 7/1/2021 11:27 AM     Author: Lisa Rene NA Service: -- Author Type: Nursing Assistant    Filed: 7/1/2021 11:32 AM Encounter Date: 7/1/2021 Status: Signed    : Lisa Rene NA (Nursing Assistant)       Reason for Call:  Other prescription     Detailed comments:     Patient was prescribed HYDROcodone-acetaminophen 5-325 mg per tablet- 1x a day    Pt was taking double the strength 4 times a day per Dr. Rivera per pt, pt thought   that she was going to be started at 4 times a day and it was only 1 time a day.     I did schedule pt VV with Dr. GONZALES today @ 2:30pm- pt missed VV call this morning, she did call and was talking with me @ 10:35am wondering it provider was behind. If OV isn't needed for this please reach out to patient, patient was seen by Dr. GONZALES last week. Thank you.      Phone Number Patient can be reached at: Other phone number:  [phone] (pt borrowing this phone)    Best Time: anytime    Can we leave a detailed message on this number?: Yes    Call taken on 7/1/2021 at 11:28 AM by Lisa Rene

## 2021-07-05 ENCOUNTER — COMMUNICATION - HEALTHEAST (OUTPATIENT)
Dept: INTERNAL MEDICINE | Facility: CLINIC | Age: 56
End: 2021-07-05

## 2021-07-05 NOTE — ADDENDUM NOTE
Addendum Note by Benita Rivera MD at 7/5/2021  4:47 PM     Author: Benita Rivera MD Service: -- Author Type: Physician    Filed: 7/5/2021  4:47 PM Encounter Date: 6/28/2021 Status: Signed    : Benita Rivera MD (Physician)    Addended by: BENITA RIVERA on: 7/5/2021 04:47 PM        Modules accepted: Orders

## 2021-07-05 NOTE — TELEPHONE ENCOUNTER
Telephone Encounter by Benita Rivera MD at 7/5/2021  4:45 PM     Author: Benita Rivera MD Service: -- Author Type: Physician    Filed: 7/5/2021  4:47 PM Encounter Date: 6/28/2021 Status: Signed    : Benita Rivera MD (Physician)       As per Dr GONZALES note, recommended scare narcotics and given 7 days worth. His note does not suggest taking it 4 times a day.  Will call in 7 more pills but pt needs appointment with [facility].  Guzman can you check on the referral?

## 2021-07-05 NOTE — TELEPHONE ENCOUNTER
Telephone Encounter by Steinert, Joy C, CMA at 7/5/2021 12:18 PM     Author: Steinert, Joy C, CMA Service: -- Author Type: Certified Medical Assistant    Filed: 7/5/2021 12:20 PM Encounter Date: 7/5/2021 Status: Signed    : Steinert, Joy C, CMA (Certified Medical Assistant)       Dr. GONZALES    Patient is wondering if you call in a rx for her hydrocodone.  She said that she needs the dose increased.  She said that she is having surgery on her neck.  She first has to quit smoking and than she will schedule an appt for her neck surger.  She doesn't have a phone that you can call her back at.

## 2021-07-06 ENCOUNTER — COMMUNICATION - HEALTHEAST (OUTPATIENT)
Dept: PHARMACY | Facility: HOSPITAL | Age: 56
End: 2021-07-06

## 2021-07-06 NOTE — TELEPHONE ENCOUNTER
Telephone Encounter by Don Viveros MD at 7/6/2021  6:49 AM     Author: Don Viveros MD Service: -- Author Type: Physician    Filed: 7/6/2021  6:50 AM Encounter Date: 7/5/2021 Status: Signed    : Don Viveros MD (Physician)       Please review the provider's recommendation from the last visit/ encounter and inform the patient if not already.

## 2021-07-06 NOTE — TELEPHONE ENCOUNTER
Telephone Encounter by Ct Sheets LPN at 7/6/2021  7:52 AM     Author: Ct Sheets LPN Service: -- Author Type: Licensed Nurse    Filed: 7/6/2021  7:52 AM Encounter Date: 7/5/2021 Status: Signed    : Ct Sheets LPN (Licensed Nurse)       HYDROcodone-acetaminophen 5-325 mg per tablet 7 tablet 0 7/5/2021  No   Sig - Route: Take 1 tablet by mouth daily as needed for pain. - Oral   Sent to pharmacy as: HYDROcodone 5 mg-acetaminophen 325 mg tablet   Earliest Fill Date: 7/5/2021   E-Prescribing Status: Receipt confirmed by pharmacy (7/5/2021  4:47 PM CDT)     7 tablets called sent into pharmacy yesterday

## 2021-07-07 NOTE — TELEPHONE ENCOUNTER
Telephone Encounter by Claudia Lopez at 6/29/2021  9:34 AM     Author: Claudia Lopez Service: -- Author Type: --    Filed: 6/29/2021  9:39 AM Encounter Date: 6/28/2021 Status: Signed    : Claudia Lopez       No further PA needed, patient needed to fill a mandatory 7 days supply per opioid guidelines.    Patient filled a 7 days supply on 6/25/2021 and confirmed with [facility] that insurance covered this amount.  The patient will now be able to fill the prescribed amount at next fill.     Per pharmacist since they filled the 7 days supply from the original Rx sent on 6/25 the remaining quantity is now void.  Pharmacy will need a new Rx for next fill.  Routing back to clinic for review.

## 2021-07-07 NOTE — TELEPHONE ENCOUNTER
txtr message sent informing of [facility] Rx sent and pain clinic referal.    Kwaku Hager RN  [facility]

## 2021-07-07 NOTE — TELEPHONE ENCOUNTER
Central PA team  [phone]  Pool: HE PA MED ([id number])          PA has been initiated.       PA form completed and faxed insurance via Cover My Meds     Key:  [id number]     Medication:  HYDROcodone-acetaminophen 5-325 mg per tablet    Insurance:  [employer]         Response will be received via fax and may take up to 5-10 business days depending on plan

## 2021-07-07 NOTE — LETTER
Letter by Don Viveros MD at      Author: Don Viveros MD Service: -- Author Type: --    Filed:  Encounter Date: 6/25/2021 Status: (Other)         Divina Blue  [address] [place] [address]             June 25, 2021         Dear Ms. Blue,    Below are the results from your recent visit:    Resulted Orders   Drug Abuse 1+, Urine   Result Value Ref Range    Amphetamines Screen Negative Screen Negative    Benzodiazepines Screen Negative Screen Negative    Opiates (!) Screen Negative     Screen Positive (Confirmation available on request)    Phencyclidine Screen Negative Screen Negative    THC Screen Negative Screen Negative    Barbiturates Screen Negative Screen Negative    Cocaine Metabolite Screen Negative Screen Negative    Methadone Screen Negative Screen Negative    Oxycodone Screen Negative Screen Negative    Creatinine, Urine 65.7 mg/dL    Narrative    Drug                           Screening Threshold    Amphetamines                    1000 ng/mL  Benzodiazepine                   200 ng/mL  Opiates                          300 ng/mL  Phencyclidine                     25 ng/mL  THC Metabolite                    50 ng/mL  Barbiturates                     200 ng/mL  Cocaine Metabolite               150 ng/mL  Methadone                        300 ng/mL  Oxycodone                        100 ng/mL    Screening results are to be used only for medical purposes.  Unconfirmed screening results are not to be used for non-  medical purposes.        Positive for opiates as declared    No aberrancies found     Please call with questions or contact us using [employer].    Sincerely,        Electronically signed by Don Viveros MD

## 2021-07-08 NOTE — TELEPHONE ENCOUNTER
Telephone Encounter by Mandeep Osborn at 7/8/2021  2:36 PM     Author: Mandeep Osborn Service: -- Author Type: Patient Access    Filed: 7/8/2021  2:49 PM Encounter Date: 7/5/2021 Status: Signed    : Mandeep Osborn (Patient Access)       Patient calling in based on message relayed to her son that someone is trying to reach her here from the clinic. Writer unsure why someone was attempting to reach her based on the multiple messages in the system.  Patient stating that she is still waiting for a refill of her pain medication.  That she was told she needed to sign a controlled substance agreement, which she has done.  She is actively working to get into the [facility] clinic (order is in chart and notes from pain clinic are present on order to confirm this).  Patient was told to call back to pain clinic on Tuesday 7/13/21 for an update on her intake.  Patient reports that she is only taking one pain pill per day at this time per Dr GONZALES instructions, but they are not working well.  She has the remains of a 7 day supply from Dr Rivera that she picked up 2 days ago, but is wondering if she can get something just a little stronger, or perhaps increase her dose of the lesser pills?  Please call or message patient to discuss.    Patient does not have working phone at this time for incoming calls, can only make outgoing calls.  Does have [employer] set up, so messages can be sent there (preferred) or you can call her son and he will relay messages as well.  Son can be reached at [phone]. Okay to leave VM on his phone.

## 2021-07-08 NOTE — TELEPHONE ENCOUNTER
Telephone Encounter by Kaylyn Norton at 7/8/2021  1:57 PM     Author: Kaylyn Norton Service: -- Author Type: Patient Access    Filed: 7/8/2021  1:58 PM Encounter Date: 7/5/2021 Status: Signed    : Kaylyn Norton (Patient Access)       Who is calling:  Divina  Reason for Call:  Please call patient at [phone], that phone # is working.  Date of last appointment with primary care:   Okay to leave a detailed message: Yes

## 2021-07-08 NOTE — TELEPHONE ENCOUNTER
Telephone Encounter by Steinert, Joy C, CMA at 7/8/2021  1:41 PM     Author: Steinert, Joy C, CMA Service: -- Author Type: Certified Medical Assistant    Filed: 7/8/2021  1:44 PM Encounter Date: 6/28/2021 Status: Signed    : Steinert, Joy C, CMA (Certified Medical Assistant)       Unable to leave a message.  Patient's voicemail is not set up.

## 2021-07-09 ENCOUNTER — MEDICAL CORRESPONDENCE (OUTPATIENT)
Dept: HEALTH INFORMATION MANAGEMENT | Facility: CLINIC | Age: 56
End: 2021-07-09

## 2021-07-09 ENCOUNTER — COMMUNICATION - HEALTHEAST (OUTPATIENT)
Dept: INTERNAL MEDICINE | Facility: CLINIC | Age: 56
End: 2021-07-09

## 2021-07-09 DIAGNOSIS — G89.4 CHRONIC PAIN SYNDROME: ICD-10-CM

## 2021-07-09 DIAGNOSIS — M54.50 SEVERE LOW BACK PAIN: ICD-10-CM

## 2021-07-09 NOTE — TELEPHONE ENCOUNTER
Telephone Encounter by Kristine Cao at 7/9/2021 11:18 AM     Author: Kristine Cao Service: -- Author Type: --    Filed: 7/9/2021 11:28 AM Encounter Date: 7/9/2021 Status: Signed    : Kristine Cao   [phone] phone    Requesting Detailed Written Orders for the following DME items:    Raised toliet seat     Hand Held Reacher also referred to as trash     Please fax order to Darion Case office fax  [phone]

## 2021-07-09 NOTE — TELEPHONE ENCOUNTER
Telephone Encounter by Jackie Lobato DO at 7/9/2021  2:50 PM     Author: Jackie Lobato DO Service: -- Author Type: Physician    Filed: 7/9/2021  2:51 PM Encounter Date: 7/9/2021 Status: Signed    : Jackie Lobato DO (Physician)       Please fax over, th orders are in my Outbox

## 2021-07-09 NOTE — TELEPHONE ENCOUNTER
Telephone Encounter by Bernadette Lomeli CMA at 7/9/2021  3:13 PM     Author: Bernadette Lomeli CMA Service: -- Author Type: Medical Assistant    Filed: 7/9/2021  3:13 PM Encounter Date: 7/9/2021 Status: Signed    : Bernadette Lomeli CMA (Medical Assistant)       Faxed over DME forms to [phone]

## 2021-07-09 NOTE — TELEPHONE ENCOUNTER
Telephone Encounter by Benita Barakat CMA at 7/9/2021 12:35 PM     Author: Benita Barakat CMA Service: -- Author Type: Certified Medical Assistant    Filed: 7/9/2021 12:35 PM Encounter Date: 7/9/2021 Status: Signed    : Benita Barakat CMA (Certified Medical Assistant)       Please write order for below DME

## 2021-07-12 DIAGNOSIS — G89.4 CHRONIC PAIN SYNDROME: Primary | ICD-10-CM

## 2021-07-12 NOTE — TELEPHONE ENCOUNTER
Last visit: 7/1/2021-Dr Viveros  Assessment & Plan     Chronic pain syndrome  Chronic low back pain without sciatica, unspecified back pain laterality    Chart and back/ lumbar imaging reviewed   MR lumbar spine full dating back to 3/4/2020 reveals  1. Mild to moderate lumbar spondylosis. No high-grade canal narrowing. Mildly progressive mild to moderate canal narrowing and right foraminal narrowing at L3-L4.     2. No acute fracture.     3. Multilevel degenerative disc disease and facet arthropathy.     4. Mild chronic superior endplate compression of L1 is stable.     5. Postsurgical changes L4-S1.    Recommendation:     - referral to spine care / - Physical Therapy for low back pain, and prescribed NSAIDs primarily for the pain management.    - allow for scarce use of opiate for severe pain. Will monitor use closely.   - To sign CSA at the next appointment.   - referral to a different pain clinic system, since current insurance does not apply to the current system referred.        Last UDS: 6/24/2021        Last filled:   HYDROcodone-acetaminophen 5-325 mg per tablet 7 tablet 0 7/5/2021  No   Sig - Route: Take 1 tablet by mouth daily as needed for pain. - Oral   Sent to pharmacy as: HYDROcodone 5 mg-acetaminophen 325 mg tablet   Earliest Fill Date: 7/5/2021   E-Prescribing Status: Receipt confirmed by pharmacy (7/5/2021  4:47 PM CDT)       Next Visit: 7/29/2021-Dr Viveros

## 2021-07-12 NOTE — TELEPHONE ENCOUNTER
Reason for Call:  Medication or medication refill:    Do you use a [facility]?  Name of the pharmacy and phone number for the current request:  [facility]    Name of the medication requested:     Hydrocodone-acetaminophen 5-500 mg    Other request: Patient is currently out of meds. Please expedite if possible.    Can we leave a detailed message on this number? YES    Phone number patient can be reached at: Home number on file [phone] (home)    Best Time: Any time    Call taken on 7/12/2021 at 1:51 PM by Mandeep Osborn

## 2021-07-13 NOTE — TELEPHONE ENCOUNTER
Patient is calling again today.  She would like to know if she can get this rx filled today. Please call her back at .

## 2021-07-13 NOTE — TELEPHONE ENCOUNTER
Patient calling again to check status of below request.  Please reach out to her VIA [employer]. She does not have a working phone at this time.   normal... Well appearing, well nourished, awake, alert, oriented to person, place, time/situation and in no apparent distress.

## 2021-07-14 ENCOUNTER — MEDICAL CORRESPONDENCE (OUTPATIENT)
Dept: HEALTH INFORMATION MANAGEMENT | Facility: CLINIC | Age: 56
End: 2021-07-14

## 2021-07-14 NOTE — TELEPHONE ENCOUNTER
pls call patient   As discussed and advised previously   Recommendation:      - referral to spine care / - Physical Therapy for low back pain, and prescribing NSAIDs primarily for the pain management.     - allow for scarce use of opiate for severe pain. Will monitor use closely.   -  To sign CSA at the next appointment.   - referral to a different pain clinic system, since current insurance does not apply to the current system referred    If agreed to the above, will refill the opiate as a bridge to the said specialists.

## 2021-07-14 NOTE — TELEPHONE ENCOUNTER
Pt states she has been trying for weeks to get her refill of Hydrocodone.    She needs PCP to approve.  She was not able to  the RX earlier as there was an issue with her insurance, she now has the issue cleared up and needs the RX to be approved by PCP.

## 2021-07-15 ENCOUNTER — TELEPHONE (OUTPATIENT)
Dept: FAMILY MEDICINE | Facility: CLINIC | Age: 56
End: 2021-07-15

## 2021-07-15 RX ORDER — HYDROCODONE BITARTRATE AND ACETAMINOPHEN 5; 325 MG/1; MG/1
1 TABLET ORAL DAILY PRN
Qty: 30 TABLET | Refills: 0 | Status: SHIPPED | OUTPATIENT
Start: 2021-07-15 | End: 2021-08-05

## 2021-07-15 NOTE — TELEPHONE ENCOUNTER
"Contacted patient who explained the following-    1. Has filled out information for pain clinic here in [place], waiting on a call back to get scheduled.  Plans to work with pain clinic until she can have surgery.  Per patient its her \"cervical area\" and she is not a candidate for physical therapy but rather needs surgery.  She will need to quit smoking for 2 months before they will do surgery, plans to work with pain clinic during this period.    2. She never received the 30 tabs Dr. GONZALES sent, but rather only got 7 pills as that was the way the insurance dictated for 1st fill.  Now insurance will accept Rx in its entirety.      3. Patient states she already came in to fill out CSA    4. Patient states allergic to NSAIDs    5. Next appointment with PCP in 2 weeks.      "

## 2021-07-15 NOTE — TELEPHONE ENCOUNTER
Prior Authorization Approval    Authorization Effective Date: 6/15/2021  Authorization Expiration Date: 7/15/2022  Medication: hydrocodone-acetaminophen 5-325mg  APPROVED   Approved Dose/Quantity:   Reference #:     Insurance Company: [employer] - Phone [phone] Fax [phone]  Expected CoPay:       CoPay Card Available:      Foundation Assistance Needed:    Which Pharmacy is filling the prescription (Not needed for infusion/clinic administered): [facility] [place], [place] - [address] [place]  Pharmacy Notified: Yes  Patient Notified: Yes

## 2021-07-15 NOTE — TELEPHONE ENCOUNTER
PA Initiation    Medication: hydrocodone-acetaminophen 5-325mg   Insurance Company: [employer] - Phone [phone] Fax [phone]  Pharmacy Filling the Rx: [place], [place] - [facility]  Filling Pharmacy Phone: [phone]  Filling Pharmacy Fax:    Start Date: 7/15/2021

## 2021-07-15 NOTE — TELEPHONE ENCOUNTER
HYDROcodone-acetaminophen (NORCO) 5-325 MG tablet 30 tablet 0 7/15/2021  --   Sig - Route: Take 1 tablet by mouth daily as needed for moderate to severe pain - Oral   Sent to pharmacy as: HYDROcodone-Acetaminophen 5-325 MG Oral Tablet (NORCO)   Class: E-Prescribe   Earliest Fill Date: 7/15/2021   Order: [id number]   E-Prescribing Status: Receipt confirmed by pharmacy (7/15/2021  1:13 PM CDT)

## 2021-07-15 NOTE — TELEPHONE ENCOUNTER
Prior Authorization Retail Medication Request    Medication/Dose:   ICD code (if different than what is on RX):  N/A  Previously Tried and Failed:  N/A  Rationale:  N/A    Insurance Name:  [facility]  Insurance ID:  [id number]      Pharmacy Information (if different than what is on RX)  Name:  [facility] [place]  Phone:  [phone]

## 2021-07-27 ENCOUNTER — MYC MEDICAL ADVICE (OUTPATIENT)
Dept: FAMILY MEDICINE | Facility: CLINIC | Age: 56
End: 2021-07-27

## 2021-07-29 NOTE — TELEPHONE ENCOUNTER
----- Message from Guru Martin MD sent at 7/14/2021  9:42 AM CDT -----  This pt has been referred to the pain center.  She had [employer], unfortunately that has not been covering me. Her chart reflects significant mental health concerns.  Our Mid-level providers are able to see these patients, though when mental health concerns present they request a patient is established with a Psychiatrist.  I discussed this with pt, she indicated to me and subsequently to my staff that she declines that.  She requested to talk to our clinic manager who tried to reach her but my phone does not work.    We are appealing my coverage with [employer].  In the meantime she will not be scheduled here.    Guru Martin

## 2021-08-05 ENCOUNTER — OFFICE VISIT (OUTPATIENT)
Dept: FAMILY MEDICINE | Facility: CLINIC | Age: 56
End: 2021-08-05
Payer: COMMERCIAL

## 2021-08-05 VITALS
BODY MASS INDEX: 39.45 KG/M2 | DIASTOLIC BLOOD PRESSURE: 78 MMHG | SYSTOLIC BLOOD PRESSURE: 110 MMHG | HEART RATE: 95 BPM | OXYGEN SATURATION: 97 % | WEIGHT: 202 LBS

## 2021-08-05 DIAGNOSIS — G89.4 CHRONIC PAIN SYNDROME: Primary | ICD-10-CM

## 2021-08-05 PROCEDURE — 99417 PROLNG OP E/M EACH 15 MIN: CPT | Performed by: FAMILY MEDICINE

## 2021-08-05 PROCEDURE — 99215 OFFICE O/P EST HI 40 MIN: CPT | Performed by: FAMILY MEDICINE

## 2021-08-05 RX ORDER — HYDROCODONE BITARTRATE AND ACETAMINOPHEN 5; 325 MG/1; MG/1
1 TABLET ORAL 2 TIMES DAILY PRN
Qty: 60 TABLET | Refills: 0 | Status: SHIPPED | OUTPATIENT
Start: 2021-08-05 | End: 2021-09-02

## 2021-08-05 NOTE — PROGRESS NOTES
Assessment & Plan     Chronic pain syndrome  Discussed in depth and detail as to why she needs to be consulted by the pain specialist.   She is open to trying another clinic and another referral placed.     Plan:   - Pain Management Referral; Future  Discussed the daily  and minimum dosage to ease the pain.   Patient agreeable to 2 tabs daily prn   Bridge refill until seen by the pain clinic.     - HYDROcodone-acetaminophen (NORCO) 5-325 MG tablet; Take 1 tablet by mouth 2 times daily as needed for moderate to severe pain      I spent a total of 69 minutes on the day of the visit.   Time spent doing chart review, history and exam, documentation and further activities per the note  {Provider  Link to [facility] Help Grid :184828}           Return in about 4 weeks (around 9/2/2021) for Follow up.        Subjective   Divina Blue is a 55 year old female here in follow up of chronic pain, requesting a refill of Norco.  She states that the current dosage is merely enough to curb the pain and requesting an increase in the dosage.  She is unable to see the pain clinic previously referred due to lack of insurance coverage.          Review of Systems         Objective    /78   Pulse 95   Wt 91.6 kg (202 lb)   SpO2 97%   BMI 39.45 kg/m    Body mass index is 39.45 kg/m .     Physical Exam               Juanjose Viveros MD  [facility]

## 2021-09-02 ENCOUNTER — VIRTUAL VISIT (OUTPATIENT)
Dept: FAMILY MEDICINE | Facility: CLINIC | Age: 56
End: 2021-09-02
Payer: COMMERCIAL

## 2021-09-02 DIAGNOSIS — G89.4 CHRONIC PAIN SYNDROME: Primary | ICD-10-CM

## 2021-09-02 PROCEDURE — 99213 OFFICE O/P EST LOW 20 MIN: CPT | Mod: TEL | Performed by: FAMILY MEDICINE

## 2021-09-02 RX ORDER — HYDROCODONE BITARTRATE AND ACETAMINOPHEN 5; 325 MG/1; MG/1
1 TABLET ORAL 2 TIMES DAILY PRN
Qty: 60 TABLET | Refills: 0 | Status: SHIPPED | OUTPATIENT
Start: 2021-09-03 | End: 2021-10-06

## 2021-09-02 ASSESSMENT — ANXIETY QUESTIONNAIRES
GAD7 TOTAL SCORE: 3
3. WORRYING TOO MUCH ABOUT DIFFERENT THINGS: SEVERAL DAYS
5. BEING SO RESTLESS THAT IT IS HARD TO SIT STILL: NOT AT ALL
8. IF YOU CHECKED OFF ANY PROBLEMS, HOW DIFFICULT HAVE THESE MADE IT FOR YOU TO DO YOUR WORK, TAKE CARE OF THINGS AT HOME, OR GET ALONG WITH OTHER PEOPLE?: NOT DIFFICULT AT ALL
GAD7 TOTAL SCORE: 3
4. TROUBLE RELAXING: SEVERAL DAYS
7. FEELING AFRAID AS IF SOMETHING AWFUL MIGHT HAPPEN: NOT AT ALL
2. NOT BEING ABLE TO STOP OR CONTROL WORRYING: NOT AT ALL
7. FEELING AFRAID AS IF SOMETHING AWFUL MIGHT HAPPEN: NOT AT ALL
6. BECOMING EASILY ANNOYED OR IRRITABLE: SEVERAL DAYS
1. FEELING NERVOUS, ANXIOUS, OR ON EDGE: NOT AT ALL
GAD7 TOTAL SCORE: 3

## 2021-09-02 ASSESSMENT — PATIENT HEALTH QUESTIONNAIRE - PHQ9
10. IF YOU CHECKED OFF ANY PROBLEMS, HOW DIFFICULT HAVE THESE PROBLEMS MADE IT FOR YOU TO DO YOUR WORK, TAKE CARE OF THINGS AT HOME, OR GET ALONG WITH OTHER PEOPLE: NOT DIFFICULT AT ALL
SUM OF ALL RESPONSES TO PHQ QUESTIONS 1-9: 0
SUM OF ALL RESPONSES TO PHQ QUESTIONS 1-9: 0

## 2021-09-02 NOTE — PROGRESS NOTES
"Divina is a 55 year old who is being evaluated via a billable video visit.      How would you like to obtain your AVS? MyChart  If the video visit is dropped, the invitation should be resent by: Text to cell phone: [phone]  Will anyone else be joining your video visit? No  {If patient encounters technical issues they should call [phone] :538301}    Video Start Time: {video visit start/end time for provider to select:152948}    {PROVIDER CHARTING PREFERENCE:668053}    Subjective   Divina is a 55 year old who presents for the following health issues {ACCOMPANIED BY STATEMENT (Optional):236328}    HPI     {SUPERLIST (Optional):357494}  {additonal problems for provider to add (Optional):208507}    Review of Systems   {ROS COMP (Optional):317093}      Objective           Vitals:  No vitals were obtained today due to virtual visit.    Physical Exam   {video visit exam brief selected:793518::\"GENERAL: Healthy, alert and no distress\",\"EYES: Eyes grossly normal to inspection.  No discharge or erythema, or obvious scleral/conjunctival abnormalities.\",\"RESP: No audible wheeze, cough, or visible cyanosis.  No visible retractions or increased work of breathing.  \",\"SKIN: Visible skin clear. No significant rash, abnormal pigmentation or lesions.\",\"NEURO: Cranial nerves grossly intact.  Mentation and speech appropriate for age.\",\"PSYCH: Mentation appears normal, affect normal/bright, judgement and insight intact, normal speech and appearance well-groomed.\"}    {Diagnostic Test Results (Optional):412046}    {AMBULATORY ATTESTATION (Optional):633241}        Video-Visit Details    Type of service:  Video Visit    Video End Time:{video visit start/end time for provider to select:281928}    Originating Location (pt. Location): {video visit patient location:635038::\"Home\"}    Distant Location (provider location):  [facility]     Platform used for Video Visit: {Virtual Visit " "Platforms:624896::\"Mati\"}    "

## 2021-09-02 NOTE — PROGRESS NOTES
Divina is a 55 year old who is being evaluated via a billable telephone visit.      What phone number would you like to be contacted at? [phone]  How would you like to obtain your AVS? MyChart    Answers for HPI/ROS submitted by the patient on 9/2/2021  If you checked off any problems, how difficult have these problems made it for you to do your work, take care of things at home, or get along with other people?: Not difficult at all  PHQ9 TOTAL SCORE: 0  DONNY 7 TOTAL SCORE: 3    1. Chronic pain syndrome   reviewed   refill  - HYDROcodone-acetaminophen (NORCO) 5-325 MG tablet; Take 1 tablet by mouth 2 times daily as needed for moderate to severe pain  Dispense: 60 tablet; Refill: 0  - will work with our referral specialist to get a list of local available pain clinics.     Follow up in a month     Subjective   Divina is a 55 year old who presents for the follow up of chronic pain management.   She is struggling with finding another pain clinic at this time.  She has made the effort to contact a few places, but none seem to be right ones. She states that the PCA who was previously helping, her services were put on hold due to up coming surgery and currently she is empty handed.     Review of Systems         Objective         Vitals:  No vitals were obtained today due to virtual visit.              Phone call duration: 16 minutes

## 2021-09-03 ASSESSMENT — ANXIETY QUESTIONNAIRES: GAD7 TOTAL SCORE: 3

## 2021-10-06 ENCOUNTER — VIRTUAL VISIT (OUTPATIENT)
Dept: FAMILY MEDICINE | Facility: CLINIC | Age: 56
End: 2021-10-06
Payer: COMMERCIAL

## 2021-10-06 DIAGNOSIS — G89.4 CHRONIC PAIN SYNDROME: ICD-10-CM

## 2021-10-06 PROCEDURE — 99213 OFFICE O/P EST LOW 20 MIN: CPT | Mod: TEL | Performed by: FAMILY MEDICINE

## 2021-10-06 RX ORDER — HYDROCODONE BITARTRATE AND ACETAMINOPHEN 5; 325 MG/1; MG/1
1 TABLET ORAL 2 TIMES DAILY PRN
Qty: 60 TABLET | Refills: 0 | Status: SHIPPED | OUTPATIENT
Start: 2021-10-06 | End: 2021-11-04

## 2021-10-06 NOTE — PROGRESS NOTES
Divina is a 55 year old who is being evaluated via a billable telephone visit.      What phone number would you like to be contacted at? [phone]   How would you like to obtain your AVS? Mail a copy    Assessment & Plan     Chronic pain syndrome   reviewed    Refill   - HYDROcodone-acetaminophen (NORCO) 5-325 MG tablet; Take 1 tablet by mouth 2 times daily as needed for moderate to severe pain    Patient has scheduled with [facility] pain clinic.   Referral will be sent .                    No follow-ups on file.    Juanjose Viveros MD  [facility]   Divina is a 55 year old who presents in follow up of :     1. Chronic pain syndrome  She has contacted the [facility] pain clinic, and they are asking for the referral.   She is requesting a refill on her [place].   No other concerns.       Review of Systems         Objective           Vitals:  No vitals were obtained today due to virtual visit.              Phone call duration: 15  minutes

## 2021-10-17 ENCOUNTER — HEALTH MAINTENANCE LETTER (OUTPATIENT)
Age: 56
End: 2021-10-17

## 2021-11-02 ENCOUNTER — TELEPHONE (OUTPATIENT)
Dept: FAMILY MEDICINE | Facility: CLINIC | Age: 56
End: 2021-11-02
Payer: COMMERCIAL

## 2021-11-02 NOTE — TELEPHONE ENCOUNTER
"Sydni calling to request orders for the following on behalf of the patient:    Order for \"Grabber\" and a \"Thera-stick\".      Sydni stating patient says she's talked to Dr GONZALES about this in the past, just has not gotten orders placed yet.  Please forward orders to Sydni at [phone] and she will send to appropriate DME supply location.    Any questions please call Sydni at [phone].   "

## 2021-11-04 ENCOUNTER — VIRTUAL VISIT (OUTPATIENT)
Dept: FAMILY MEDICINE | Facility: CLINIC | Age: 56
End: 2021-11-04
Payer: COMMERCIAL

## 2021-11-04 DIAGNOSIS — G89.4 CHRONIC PAIN SYNDROME: Primary | ICD-10-CM

## 2021-11-04 PROCEDURE — 99213 OFFICE O/P EST LOW 20 MIN: CPT | Mod: TEL | Performed by: FAMILY MEDICINE

## 2021-11-04 RX ORDER — HYDROCODONE BITARTRATE AND ACETAMINOPHEN 5; 325 MG/1; MG/1
1 TABLET ORAL 2 TIMES DAILY PRN
Qty: 60 TABLET | Refills: 0 | Status: SHIPPED | OUTPATIENT
Start: 2021-11-04

## 2021-11-04 NOTE — TELEPHONE ENCOUNTER
Left message on Sydni's VM that these items cannot be ordered from DME , as the provider had stated.

## 2021-11-04 NOTE — TELEPHONE ENCOUNTER
These are not orderable through DME   May also want to check with her insurance.   Patient may shop these on line.

## 2021-11-04 NOTE — PROGRESS NOTES
Divina is a 55 year old who is being evaluated via a billable telephone visit.      What phone number would you like to be contacted at? [phone]   How would you like to obtain your AVS? Mail a copy    Assessment & Plan     Chronic pain syndrome  Options including addition of duloxetine or gabapentin were reviewed with the patient but expressing reluctance, stating that has tried in the past without any help.  Advised against escalating the opiate dosage at this time and recommending further consultation by the pain specialist     - HYDROcodone-acetaminophen (NORCO) 5-325 MG tablet; Take 1 tablet by mouth 2 times daily as needed for moderate to severe pain   reviewed with no aberrancies                     No follow-ups on file.    Juanjose Viveros MD  [facility]   Divina is a 55 year old who is here for follow-up of chronic pain syndrome requesting a refill on Norco.  She notes that her pain has been escalating over the past few weeks and looking to see additional options in helping her pain.  She has an appointment with the pain specialist clinic on 11/8/2021.        Review of Systems         Objective         Vitals:  No vitals were obtained today due to virtual visit.                    Phone call duration: 27 minutes

## 2021-11-04 NOTE — TELEPHONE ENCOUNTER
Please place orders if appropriate, please send back to pool when complete so we can fax orders.  Elizabeth Lou CSS

## 2021-11-05 NOTE — TELEPHONE ENCOUNTER
Patient called stating the benzonatate (Tessalon Perles) 100 MG capsule is not helping and was wondering if something else could be called into [facility]   Telephone Encounter by Angie Page at 4/16/2019  8:20 AM     Author: Angie Page Service: -- Author Type: --    Filed: 4/16/2019  8:22 AM Encounter Date: 4/12/2019 Status: Signed    : Angie Page       PRIOR AUTHORIZATION DENIED    Denial Rational: Patient must try and fail 2 additional formulary alternatives first: zolpidem, temazepam, OTC doxylamine          Appeal Information: If provider would like to appeal please provide a letter of medical necessity stating why formulary alternatives would not be clinically appropriate for the patient and route back to the PA team.

## 2021-11-08 ENCOUNTER — TRANSFERRED RECORDS (OUTPATIENT)
Dept: HEALTH INFORMATION MANAGEMENT | Facility: CLINIC | Age: 56
End: 2021-11-08
Payer: COMMERCIAL

## 2021-11-17 NOTE — TELEPHONE ENCOUNTER
"Patient calling to inquire about status of the thera stick order from below.  Stating she got a call from Sydni that Dr GONZALES hasn't \"signed off on it yet\".  Message from Dr GONZALES below relayed that it cannot be ordered thru DME.  Patient requesting that clinical team call Sydni to discuss and get this sorted out please.    Sydni can be reached at [phone].  "

## 2021-11-18 NOTE — TELEPHONE ENCOUNTER
Writer called Sydni to see what we could do to help with this medical equipment that the patient is requesting, there was no answer, RALPH GAMEZ was left.

## 2021-11-29 ENCOUNTER — TRANSFERRED RECORDS (OUTPATIENT)
Dept: HEALTH INFORMATION MANAGEMENT | Facility: CLINIC | Age: 56
End: 2021-11-29
Payer: COMMERCIAL

## 2021-12-27 ENCOUNTER — TRANSFERRED RECORDS (OUTPATIENT)
Dept: HEALTH INFORMATION MANAGEMENT | Facility: CLINIC | Age: 56
End: 2021-12-27
Payer: COMMERCIAL

## 2022-02-03 ENCOUNTER — TRANSFERRED RECORDS (OUTPATIENT)
Dept: HEALTH INFORMATION MANAGEMENT | Facility: CLINIC | Age: 57
End: 2022-02-03
Payer: COMMERCIAL

## 2022-03-02 ENCOUNTER — TRANSFERRED RECORDS (OUTPATIENT)
Dept: HEALTH INFORMATION MANAGEMENT | Facility: CLINIC | Age: 57
End: 2022-03-02
Payer: COMMERCIAL

## 2022-04-18 ENCOUNTER — TRANSFERRED RECORDS (OUTPATIENT)
Dept: HEALTH INFORMATION MANAGEMENT | Facility: CLINIC | Age: 57
End: 2022-04-18
Payer: COMMERCIAL

## 2022-04-18 LAB — PHQ9 SCORE: 0

## 2022-05-31 ENCOUNTER — TRANSFERRED RECORDS (OUTPATIENT)
Dept: HEALTH INFORMATION MANAGEMENT | Facility: CLINIC | Age: 57
End: 2022-05-31
Payer: COMMERCIAL

## 2022-07-23 ENCOUNTER — HEALTH MAINTENANCE LETTER (OUTPATIENT)
Age: 57
End: 2022-07-23

## 2022-10-01 ENCOUNTER — HEALTH MAINTENANCE LETTER (OUTPATIENT)
Age: 57
End: 2022-10-01

## 2023-01-01 NOTE — TELEPHONE ENCOUNTER
Telephone Encounter by Eliud Juarez CMA at 6/30/2021  2:44 PM     Author: Eliud Juarez CMA Service: -- Author Type: Certified Medical Assistant    Filed: 6/30/2021  2:45 PM Encounter Date: 6/17/2021 Status: Signed    : Eliud Juarez CMA (Certified Medical Assistant)       LMTCB   2nd attempt   Eliud Juarez CMA           2023 08:44

## 2023-01-07 NOTE — PROGRESS NOTES
Family Medicine Office Visit  [facility]  Patient Name: Divina Blue  Patient Age: 52 y.o.  YOB: 1965  MRN: [id number]    Date of Visit: 11/15/2018  Reason for Office Visit:   Chief Complaint   Patient presents with     Follow-up     Had MRI on back.        Assessment / Plan / Medical Decision Making:  Divina is a 52 year old female with a history of chronic back pain s/p lumbar fusions, depression, and anxiety who presents to the office to follow up on recent MRI results.    1. Chronic Back Pain  MRI shows extensive degenerative changes. Started on Norco while waiting for [facility] evaluation.  --Ambulatory referral to [facility] (patient has everything needed to schedule but is waiting for care coordinator to return from vacation and help her)  --Continue Norco PRN for pain  --Ambulatory referral to Pain Clinic    2. Headaches  Patient gets headaches with Norco. Taking Excedrin in conjuction because she is worried they will turn into migraines. History of migraines previously controlled with triptans.  --Use Excedrin only sparingly (no more than 1/day)  --Start sumatriptan 50 mg PRN for migraines    3. Depression  Starting to feel better. Able to make plans and follow through.  --Continue fluoxetine 20 mg daily.  --Follow up in 2 weeks    4. Anxiety  Episodes of anxiety with palpitations.  --Start buspirone 15 mg two times a day.  --Follow up in 2 weeks      Health Maintenance Review  Health Maintenance   Topic Date Due     DEPRESSION FOLLOW UP  1965     MAMMOGRAM  1965     ADVANCE DIRECTIVES DISCUSSED WITH PATIENT  11/30/1982     PAP SMEAR  11/30/1994     COLONOSCOPY  11/30/2014     INFLUENZA VACCINE RULE BASED (1) 08/01/2018     TD 18+ HE  03/06/2022     TDAP ADULT ONE TIME DOSE  Completed     I am having Divina Blue start on SUMAtriptan and busPIRone. I am also having her maintain her FLUoxetine and HYDROcodone-acetaminophen.   "      HPI:  Divina Blue is a 52 y.o. female with a history of back pain s/p lumbar fusions who presents to the office today for follow up regarding her recent MRI results. She was found to have multiple degenerative findings throughout her cervical, thoracic, and lumbar spine, including regions of spinal and foraminal stenosis.    She was started on Norco PRN for pain as a temporary solution until she can be seen by the [facility]. Her rest pain has improved, but she still has significant pain with simple activities that limit her daily life. Furthermore, she reports severe headaches with the Norco, and she has been taking simultaneous Excedrin to deal with the headaches. She has a history of migraines that were previously controlled with triptans.    She also has depression and anxiety. She was started on fluoxetine 20 mg daily and reports her depression has begun to improve. She is now able to make plans and follow through on them. However, her anxiety is still poorly controlled, and she has episodes of palpitations and anxiety that \"come out of nowhere.\"    She is waiting for her care coordinator to return from vacation to make an appointment with the [facility].      Review of Systems- pertinent positive in bold:  Constitutional: Fever, chills, night sweats, fainting, weight change, fatigue, seizures, dizziness, sleeping difficulties, loud snoring/pauses in breathing, headache  Eyes: change in vision, blurred or double vision, redness/eye pain  Ears, nose, mouth, throat: change in hearing, ear pain, hoarseness, difficulty swallowing, sores in the mouth or throat  Respiratory: shortness of breath, cough, bloody sputum, wheezing  Cardiovascular: chest pain, palpitations   Gastrointestinal: abdominal pain, heartburn/indigestion, nausea/vomiting, change in appetite, change in bowel habits, constipation or diarrhea, rectal bleeding/dark stools, difficulty swallowing  Urinary: painful urination, frequent " urination, urinary urgency/incontinence, blood in urine/dark urine, nocturia  Musculoskeletal: backache/back pain (new or increasing), weakness, joint pain/stiffness (new or increasing), muscle cramps, swelling of hands, feet, ankles, leg pain/redness  Skin: change in moles/freckles, rash, nodules  Hematologic/lymphatic: swollen lymph glands, abnormal bruising/bleeding  Endocrine: excessive thirst/urination, cold or heat intolerance  Neurologic/emotional: worrisome memory change, numbness/tingling, anxiety, mood swings      Current Scheduled Meds:  Outpatient Encounter Medications as of 11/15/2018   Medication Sig Dispense Refill     FLUoxetine (PROZAC) 20 MG capsule Take 1 capsule (20 mg total) by mouth daily. 30 capsule 2     HYDROcodone-acetaminophen (NORCO )  mg per tablet Take 1 tablet by mouth every 6 (six) hours as needed for pain. 92 tablet 0     busPIRone (BUSPAR) 15 MG tablet Take 1 tablet (15 mg total) by mouth 2 (two) times a day. 30 tablet 2     SUMAtriptan (IMITREX) 50 MG tablet Take 1 tablet (50 mg total) by mouth once as needed for migraine. 30 tablet 2     No facility-administered encounter medications on file as of 11/15/2018.      Past Medical History:   Diagnosis Date     Arthritis      Depression      Urinary tract infection      Past Surgical History:   Procedure Laterality Date     BACK SURGERY       Social History     Tobacco Use     Smoking status: Current Every Day Smoker     Types: Cigarettes     Start date: 1/1/1980     Smokeless tobacco: Never Used   Substance Use Topics     Alcohol use: Not on file     Drug use: Not on file       Objective / Physical Examination:  Vitals:    11/15/18 0816   BP: 120/84   Pulse: 74   Weight: 211 lb (95.7 kg)     Wt Readings from Last 3 Encounters:   11/15/18 211 lb (95.7 kg)     BP Readings from Last 3 Encounters:   11/15/18 120/84   10/31/18 122/86     General Appearance: Alert and oriented, cooperative, affect appropriate, speech clear, in  no apparent distress  Eyes: Conjunctivae clear and sclerae non-icteric  Lungs: Clear to auscultation bilaterally. Normal inspiratory and expiratory effort  Cardiovascular: Regular rate, normal S1, S2. No murmurs, rubs, or gallops   Neuro: Alert and oriented, follows commands appropriately.     Follow-up: Return in about 2 weeks (around 11/29/2018) for Recheck, Med check. earlier if needed.    Total time spent with patient was 20 minutes with >50% of time spent in face-to-face counseling regarding the above plan       Maxim Camacho, MS3  Patient seen and discussed with Dr. Rivera.    I, Dr. Benita rivera , personally performed the services described in this documentation, as written by and performed by Maxim Camacho in my presence, and it is both accurate and complete.       show

## 2023-08-12 ENCOUNTER — HEALTH MAINTENANCE LETTER (OUTPATIENT)
Age: 58
End: 2023-08-12

## 2023-11-09 DIAGNOSIS — Z12.11 COLON CANCER SCREENING: ICD-10-CM

## 2023-11-23 ENCOUNTER — LAB (OUTPATIENT)
Dept: FAMILY MEDICINE | Facility: CLINIC | Age: 58
End: 2023-11-23
Payer: COMMERCIAL

## 2023-11-23 DIAGNOSIS — Z12.11 COLON CANCER SCREENING: ICD-10-CM

## 2024-05-28 ENCOUNTER — TELEPHONE (OUTPATIENT)
Dept: FAMILY MEDICINE | Facility: CLINIC | Age: 59
End: 2024-05-28
Payer: COMMERCIAL

## 2024-05-28 NOTE — TELEPHONE ENCOUNTER
Forms were signed and faxed.  Copies made and placed in hold/scan bin    Lesia Frost CMA on 5/28/2024 at 10:39 AM

## 2024-09-29 ENCOUNTER — HEALTH MAINTENANCE LETTER (OUTPATIENT)
Age: 59
End: 2024-09-29

## 2025-09-04 ENCOUNTER — TELEPHONE (OUTPATIENT)
Dept: PHARMACY | Facility: OTHER | Age: 60
End: 2025-09-04
Payer: COMMERCIAL